# Patient Record
Sex: MALE | Race: WHITE | Employment: FULL TIME | URBAN - METROPOLITAN AREA
[De-identification: names, ages, dates, MRNs, and addresses within clinical notes are randomized per-mention and may not be internally consistent; named-entity substitution may affect disease eponyms.]

---

## 2018-04-22 ENCOUNTER — VIRTUAL VISIT (OUTPATIENT)
Dept: FAMILY MEDICINE | Facility: OTHER | Age: 48
End: 2018-04-22

## 2018-04-22 NOTE — PROGRESS NOTES
"Date:   Clinician: Leopoldo Tsai  Clinician NPI: [id number]  Patient: Iglesia Wolff  Patient : 1970  Patient Address: [address] [place] [address]  Patient Phone: [phone]  Visit Protocol: URI  Patient Summary:  Iglesia is a 47 year old ( : 1970 ) male who initiated a Visit for cold, sinus infection, or influenza. When asked the question \"Please sign me up to receive news, health information and promotions. \", Iglesia responded \"No\".    Iglesia states his symptoms started suddenly 2-3 weeks ago. After his symptoms started, they improved and then got worse again.   His symptoms consist of facial pain or pressure, a cough, a headache, nasal congestion, malaise, and wheezing.   Symptom details     Nasal secretions: The color of his mucus is yellow.    Cough: Iglesia coughs every 5-10 minutes and his cough is more bothersome at night. Phlegm comes into his throat when he coughs. He does not believe the phlegm causes the cough. The color of the phlegm is yellow.     Wheezing: Iglesia has not ever been diagnosed with asthma. The wheezing does not interfere with his normal daily activities.    Facial pain or pressure: The facial pain or pressure feels worse when bending over or leaning forward.     Headache: He states the headache is mild (between 1-3 on a 10 point pain scale).      Iglesia denies having fever, dyspnea, ear pain, sore throat, rhinitis, chills, myalgias, and teeth pain. He also denies having recent facial or sinus surgery in the past 60 days, taking antibiotic medication for the symptoms, and having a sinus infection within the past year.   He has recently been exposed to someone with influenza. Iglesia has not been in close contact with any high risk individuals.   Weight: 290 lbs   Iglesia does not smoke or use smokeless tobacco.   Additional information as reported by the patient (free text): 2 weeks ago started with heavy chest and head congestion, had a " cough that was from deep chest/lungs, that is mostly better, but still have some coughing from upper lungs when I breathe deeply.  lost my voice 6 days ago.  A little throat pain, but not too bad - just from drainage maybe ?   No sign of improvement in anything for about a week.  MEDICATIONS:  No current medications  , ALLERGIES:   NKDA    Clinician Response:  Dear Iglesia,  Based on the information provided, you have acute bacterial sinusitis, also known as a sinus infection. Sinus infections are caused by bacteria or a virus and symptoms are almost always identical. The difference between the 2 types of infections is timing.  Sinus infections start as viral infections and symptoms improve on their own in about 7 days. If symptoms have not improved after 7 days or have even worsened, a bacterial infection may have developed.  Medication information  I am prescribing:     Amoxicillin-pot clavulanate 875-125 mg oral tablet. Take 1 tablet by mouth every 12 hours for 10 days. There are no refills with this prescription.   Antibiotics can cause an allergic reaction even if you have taken them without a problem in the past. If you develop a new rash, swelling, or difficulty breathing, stop the medication and be seen in a clinic or urgent care immediately.  Self care  The following tips will keep you as comfortable as possible while you recover:     Rest    Drink plenty of water and other liquids    Take a hot shower to loosen congestion    Take a spoonful of honey to reduce your cough     When to seek care  Please be seen in a clinic or urgent care if any of the following occur:     Symptoms do not start to improve after 3 days of treatment    New symptoms develop, or symptoms become worse      Diagnosis: Acute bacterial sinusitis  Diagnosis ICD: J01.90  Prescription: amoxicillin-pot clavulanate 875-125 mg oral tablet 20 tablet, 10 days supply. Take 1 tablet by mouth every 12 hours for 10 days. Refills: 0, Refill as  needed: no, Allow substitutions: yes  Pharmacy: [id number] - [phone] - [address], [place] [address]

## 2019-08-26 ENCOUNTER — VIRTUAL VISIT (OUTPATIENT)
Dept: FAMILY MEDICINE | Facility: OTHER | Age: 49
End: 2019-08-26

## 2019-08-26 NOTE — PROGRESS NOTES
"Date:   Clinician: Vito Warner  Clinician NPI: [id number]  Patient: Iglesia Wolff  Patient : 1970  Patient Address: [address] [place] [address]  Patient Phone: [phone]  Visit Protocol: Low back pain  Patient Summary:  Iglesia is a 49 year old ( : 1970 ) male who initiated a Visit for evaluation of Low Back Pain. When asked the question \"Please sign me up to receive news, health information and promotions. \", Iglesia responded \"No\".   A synchronous visit is necessary because the patient reported the following abnormal symptoms:   Unable to walk on toes    Unable to heel walk   His back pain began suddenly 1-6 days ago. The pain is present on the right side and is located in the low back area.   The pain varies depending on the activity.    Iglesia is experiencing shooting pain and weakness in the legs.    Symptom Details     Pain: The pain is moderate (4-6 on a 10 point pain scale). The pain shoots into his buttocks.     Weakness: Iglesia is experiencing weakness in the right leg. The weakness is not getting worse.      Iglesia denies urinary frequency, vomiting, urinary retention, feeling feverish, loss of bladder control, a rash in the same area as the back pain, abdominal pain, back muscle spasms, loss of bowel control, saddle anesthesia, numbness or tingling in the legs, dysuria, and hematuria. His pain does not decrease when bending forward.   Precipitating events  The back pain did not begin in response to an injury that happened at his work. His back pain began after sudden bending or lifting and repetitive bending or lifting.   Pertinent medical history  Iglesia has had similar episodes of back pain in the past. He has not had back surgery, kidney stones, unintended weight loss in the last three months, and cancer.   He has seen a provider to treat this episode of low back pain. Additional information about recent treatment as reported by the patient (free " text): [facility]  Was diagnosed with an inflamed lower disc that was aggravating a nerve causing severe pain, weakness and limited mobility.  I was given a 5 day, 50mg per doseage of Prednisone that did a great job getting rid of the inflammation and once that was gone I was pretty much back to normal.  Not looking for any pain relievers, I know that will go away once the inflammation is gone and OTC options aren't working.   Treatments  Iglesia tried using therapeutic remedies (such as cold pack, heat, chiropractic treatment, physical therapy) and over-the-counter medications to manage his low back pain.   Additional details about medications tried as reported by the patient (free text): Ibuprofen 200mg, 3 pills, 3 times/day   Other approaches used to manage the back pain as reported by the patient (free text): Ice pack on disc area every couple hours.   Iglesia has not tried using prescription medications to manage his back pain.   Iglesia does not smoke or use smokeless tobacco.     MEDICATIONS: Advil oral, ALLERGIES: Advil  Clinician Response:  Dear Iglesia,   Based on the information you have provided, you may have low back pain due to pinched or compressed spinal nerves.  When the nerve root becomes compressed, the pressure could cause various symptoms including pain, weakness, numbness, and tingling. A more complete health history, examination, and testing are possibly needed. For this reason, I would like you to make an appointment to be seen in person.  Medication information  I am prescribing:     Methocarbamol (Robaxin-750) 750 mg oral tablet. Take 2 tablets by mouth every 6 hours for 2 days, then 1 tablet every 6 hours for 5 days. There are no refills with this prescription.   Self care  The following tips will help prevent and reduce back pain:     Apply heating pads on the sore area for a short duration (10 minutes per hour and as needed). A hot bath or a heating pad on your lower  back may also help reduce pain and stiffness.    Maintaining a good posture reduces stress on the back muscles. To help reduce the stress on your back:    Stand and sit up straight.    Try not to slouch when standing or sitting.    Try not to stay in the same position for a long time.    Switch positions every 20 to 30 minutes if possible.    When lifting heavy objects, squat down and use your legs rather than bending at the waist.          Stress can make your back pain worse. For this reason, take time to relax and try some relaxation techniques when you feel stressed.    Click the following link for more information: Prevent back pain.     When to seek care  Please make an appointment to be seen in a clinic or urgent care if any of the following occur:  You develop new symptoms or your symptoms becomes worse  A painful rash that appears as a stripe along one side of the body  Unexplained fever  Unbearable pain  Unrelenting night pain  Seek immediate medical care if you have problems with bowel or bladder control, worsening weakness or numbness in your legs, or numbness in the inner thighs, groin, or buttocks.   Diagnosis: Acute radiculopathy/radiculitis  Diagnosis ICD: M54.10  Additional Clinician Notes: Hold advil  Synchronous Triage: phone, status: completed, duration: 190 seconds  Prescription: prednisone 10 mg oral tablet 20 tablet, 5 days supply. Take 2 tablets by mouth 2 times per day for 5 days. Refills: 0, Refill as needed: no, Allow substitutions: yes  Prescription: methocarbamol (Robaxin-750) 750 mg oral tablet 36 tablet, 7 days supply. Take 2 tablets by mouth every 6 hours for 2 days, then 1 tablet every 6 hours for 5 days. Refills: 0, Refill as needed: no, Allow substitutions: yes  Pharmacy: [facility] #[id number] - [phone] - [address] [place] [address]

## 2020-04-08 ENCOUNTER — VIRTUAL VISIT (OUTPATIENT)
Dept: FAMILY MEDICINE | Facility: OTHER | Age: 50
End: 2020-04-08

## 2020-04-09 NOTE — PROGRESS NOTES
"Date: 2020 19:24:15  Clinician: Allen Arthur  Clinician NPI: [id number]  Patient: Iglesia Wolff  Patient : 1970  Patient Address: [address] [place] [address]  Patient Phone: [phone]  Visit Protocol: Low back pain  Patient Summary:  Iglesia is a 49 year old ( : 1970 ) male who initiated a Visit for evaluation of Low Back Pain. When asked the question \"Please sign me up to receive news, health information and promotions. \", Iglesia responded \"No\".    His back pain began gradually 1-6 days ago. The pain is present on both sides and is located in the buttocks and low back area.   The pain varies depending on the activity and goes away when resting. The pain decreases when he bends forward.   He is able to walk on his toes and is able to walk on his heels with his toes lifted off the ground.   Iglesia is experiencing shooting pain.    Symptom Details   Pain: The pain is moderate (4-6 on a 10 point pain scale). The pain shoots into his buttocks.    Iglesia denies loss of bowel control, saddle anesthesia, numbness or tingling in the legs, dysuria, hematuria, feeling feverish, loss of bladder control, a rash in the same area as the back pain, abdominal pain, urinary frequency, vomiting, urinary retention, leg weakness, and back muscle spasms.   Precipitating events  The back pain did not begin in response to an injury that happened at his work. His back pain began after sudden bending or lifting.   Pertinent medical history  Iglesia has had similar episodes of back pain in the past. He has not had unintended weight loss in the last three months, cancer, back surgery, and kidney stones.   He has not seen a provider to treat this episode of low back pain.   Treatments  Iglesia tried using therapeutic remedies (such as cold pack, heat, chiropractic treatment, physical therapy) and over-the-counter medications to manage his low back pain.   Additional details about medications tried as " reported by the patient (free text): ibuprofen 200mg, 2 pills, twice a day   Other approaches used to manage the back pain as reported by the patient (free text): cold on area of problem disc   Iglesia has not tried using prescription medications to manage his back pain.   Iglesia does not need a return to work/school note.   Iglesia does not smoke or use smokeless tobacco.   Additional information as reported by the patient (free text): was diagnosed a few years ago with a disc that occasionally inflames and causes nerve pain, weakness and mobility issues.  Most instances I can deal with using OTC and rest, but about once a year I have an episode where that doesn't work.   Twice I've had presc of Prednisone that worked to get ahead of the inflammation.  One time was 50mg/day for 5 days, other time was 40/day for five days plus Robaxin.  Both worked.     MEDICATIONS: Advil oral, ALLERGIES: NKDA  Clinician Response:  Dear Iglesia,    I have sent in a prescription for prednisone to be taken daily in the morning with food for&nbsp;5 days. can also do Robaxin three times a day as needed for pain. continue with the ibuprofen and Tylenol as needed for pain. can continue to alternate heat and ice to help with pain.     If would develop worsening pain, bowel or bladder function loss, numbness, tingling, or have any concerns then follow up in clinic.      Diagnosis: Low back pain  Diagnosis ICD: M54.5  Prescription: methocarbamol (Robaxin-750) 750 mg oral tablet 30 tablet, 0 days supply. Take 1 tablet by mouth 3 times per day as needed for pain. Refills: 0, Refill as needed: no, Allow substitutions: yes  Prescription: prednisone 20 mg oral tablet 10 tablet, 5 days supply. Take 2 tablets by mouth 1 time per day for 5 days. Refills: 0, Refill as needed: no, Allow substitutions: yes  Pharmacy: [facility] #[id number] - [phone] - [address] [place] [address]

## 2020-04-13 ENCOUNTER — NURSE TRIAGE (OUTPATIENT)
Dept: NURSING | Facility: CLINIC | Age: 50
End: 2020-04-13

## 2020-04-13 ENCOUNTER — VIRTUAL VISIT (OUTPATIENT)
Dept: URGENT CARE | Facility: CLINIC | Age: 50
End: 2020-04-13
Payer: COMMERCIAL

## 2020-04-13 DIAGNOSIS — R82.998 DARK URINE: Primary | ICD-10-CM

## 2020-04-13 DIAGNOSIS — Z12.11 SPECIAL SCREENING FOR MALIGNANT NEOPLASMS, COLON: ICD-10-CM

## 2020-04-13 PROCEDURE — 82274 ASSAY TEST FOR BLOOD FECAL: CPT | Performed by: INTERNAL MEDICINE

## 2020-04-13 PROCEDURE — 99207 ZZC NO BILLABLE SERVICE THIS VISIT: CPT | Mod: 95 | Performed by: PHYSICIAN ASSISTANT

## 2020-04-13 NOTE — TELEPHONE ENCOUNTER
Triage call:    last 4-5 days   Darker colored urine   Bowel movements have been tan in color and looser   Disc in lower back that gets inflamed- couple fridays ago tweaked the back - recent on-care session (4/8/2020)- prednisone on Thursday - finished dose pack   No fever-   Drinking some fluids- encouraged him to try to drink more fluids.     Triaged to have a TV visit today. Tranferred to scheduling.     Elaine Motley RN BSN 4/13/2020 2:02 PM    COVID 19 Nurse Triage Plan/Patient Instructions    Please be aware that novel coronavirus (COVID-19) may be circulating in the community. If you develop symptoms such as fever, cough, or SOB or if you have concerns about the presence of another infection including coronavirus (COVID-19), please contact your health care provider or visit [email or web address].     Disposition/Instructions    Patient to have scheduled Telephone Visit with a provider. Follow System Ambulatory Workflow for COVID 19.     The clinic staff will assist you to schedule an appointment to complete the Telephone Visit with a provider during normal clinic hours.       Call Back If: Your symptoms worsen before you are able to complete your Telephone Visit with a provider.    Thank you for limiting contact with others, wearing a simple mask to cover your cough, practice good hand hygiene habits and accessing our virtual services where possible to limit the spread of this virus.    For more information about COVID19 and options for caring for yourself at home, please visit the CDC website at https://www.cdc.gov/coronavirus/2019-ncov/about/steps-when-sick.html  For more options for care at [facility], please visit our website at [email or web address]    For more information, please use the [place] Department of Health ([facility]) COVID-19 Hotlines (Interpreters available):     Health questions: Phone Number: [phone] or [phone] and Hours: 7 a.m. to 7 p.m.    Schools and   questions: Phone Number: [phone] or [phone] and Hours 7 a.m. to 7 p.m.                         Reason for Disposition    [1] Abnormal color is unexplained AND [2] persists > 24 hours    Additional Information    Negative: Rectal bleeding, bloody stools, or blood in stool (bowel movement)    Negative: Tarry or jet black-colored stool    Negative: [1] Stool color is black (not dark green) AND [2] patient hasn't swallowed substance that causes black stools (e.g., Pepto-Bismol, iron pills)    Negative: Diarrhea stools (i.e., watery stools, or increase in the frequency and looseness of stools)    Negative: [1] Abdomen pain is main symptom AND [2] adult male    Negative: [1] Abdomen pain is main symptom AND [2] adult female    Negative: Yellow eyes (jaundice)    Negative: Patient sounds very sick or weak to the triager    Negative: [1] Stool is light gray or whitish AND [2] unexplained    Negative: [1] Stool is mucus or pus AND [2] persists > 24 hours    Protocols used: STOOLS - UNUSUAL COLOR-A-

## 2020-04-13 NOTE — PROGRESS NOTES
"  Iglesia Wolff is a 49 year old male who is being evaluated via a billable telephone visit.      The patient has been notified of following:     \"This telephone visit will be conducted via a call between you and your physician/provider. We have found that certain health care needs can be provided without the need for a physical exam.  This service lets us provide the care you need with a short phone conversation.  If a prescription is necessary we can send it directly to your pharmacy.  If lab work is needed we can place an order for that and you can then stop by our lab to have the test done at a later time.    If during the course of the call the physician/provider feels a telephone visit is not appropriate, you will not be charged for this service.\"     Patient has given verbal consent for Telephone visit?  Yes        HPI: Iglesia Wolff is an 49 year old male who presents for evaluation and treatment of dark urine, and loose tan colored stools.    Patient instructed to call to set up an appointment to establish care with one of our primary care providers.    Patient verbalized understanding and will call [facility] to set this up.    Dano Rodas PA-C             "

## 2020-04-14 ENCOUNTER — TELEPHONE (OUTPATIENT)
Dept: GASTROENTEROLOGY | Facility: CLINIC | Age: 50
End: 2020-04-14

## 2020-04-14 ENCOUNTER — OFFICE VISIT (OUTPATIENT)
Dept: FAMILY MEDICINE | Facility: CLINIC | Age: 50
End: 2020-04-14
Payer: COMMERCIAL

## 2020-04-14 VITALS
HEIGHT: 70 IN | DIASTOLIC BLOOD PRESSURE: 106 MMHG | HEART RATE: 83 BPM | TEMPERATURE: 99.1 F | OXYGEN SATURATION: 94 % | WEIGHT: 305.7 LBS | SYSTOLIC BLOOD PRESSURE: 161 MMHG | BODY MASS INDEX: 43.76 KG/M2

## 2020-04-14 DIAGNOSIS — R79.89 ELEVATED LFTS: ICD-10-CM

## 2020-04-14 DIAGNOSIS — I16.0 ASYMPTOMATIC HYPERTENSIVE URGENCY: ICD-10-CM

## 2020-04-14 DIAGNOSIS — E66.01 MORBID OBESITY (H): ICD-10-CM

## 2020-04-14 DIAGNOSIS — G89.29 CHRONIC RIGHT-SIDED LOW BACK PAIN WITH RIGHT-SIDED SCIATICA: ICD-10-CM

## 2020-04-14 DIAGNOSIS — Z87.898 HISTORY OF VOMITING: ICD-10-CM

## 2020-04-14 DIAGNOSIS — Z13.220 LIPID SCREENING: ICD-10-CM

## 2020-04-14 DIAGNOSIS — Z13.1 SCREENING FOR DIABETES MELLITUS: ICD-10-CM

## 2020-04-14 DIAGNOSIS — M54.41 CHRONIC RIGHT-SIDED LOW BACK PAIN WITH RIGHT-SIDED SCIATICA: ICD-10-CM

## 2020-04-14 DIAGNOSIS — I10 BENIGN ESSENTIAL HYPERTENSION: Primary | ICD-10-CM

## 2020-04-14 DIAGNOSIS — Z12.11 SPECIAL SCREENING FOR MALIGNANT NEOPLASMS, COLON: ICD-10-CM

## 2020-04-14 DIAGNOSIS — R19.5 CLAY-COLORED STOOLS: ICD-10-CM

## 2020-04-14 LAB
ALBUMIN SERPL-MCNC: 3.4 G/DL (ref 3.4–5)
ALBUMIN UR-MCNC: NEGATIVE MG/DL
ALP SERPL-CCNC: 325 U/L (ref 40–150)
ALT SERPL W P-5'-P-CCNC: 698 U/L (ref 0–70)
ANION GAP SERPL CALCULATED.3IONS-SCNC: 11 MMOL/L (ref 3–14)
APPEARANCE UR: CLEAR
AST SERPL W P-5'-P-CCNC: 187 U/L (ref 0–45)
BILIRUB SERPL-MCNC: 3.5 MG/DL (ref 0.2–1.3)
BILIRUB UR QL STRIP: NEGATIVE
BUN SERPL-MCNC: 19 MG/DL (ref 7–30)
CALCIUM SERPL-MCNC: 8.9 MG/DL (ref 8.5–10.1)
CHLORIDE SERPL-SCNC: 100 MMOL/L (ref 94–109)
CHOLEST SERPL-MCNC: 271 MG/DL
CO2 SERPL-SCNC: 24 MMOL/L (ref 20–32)
COLOR UR AUTO: YELLOW
CREAT SERPL-MCNC: 0.97 MG/DL (ref 0.66–1.25)
CREAT UR-MCNC: 89 MG/DL
CRP SERPL-MCNC: 9.7 MG/L (ref 0–8)
ERYTHROCYTE [SEDIMENTATION RATE] IN BLOOD BY WESTERGREN METHOD: 8 MM/H (ref 0–20)
GFR SERPL CREATININE-BSD FRML MDRD: 89 ML/MIN/{1.73_M2}
GGT SERPL-CCNC: 1324 U/L (ref 0–75)
GLUCOSE SERPL-MCNC: 106 MG/DL (ref 70–99)
GLUCOSE UR STRIP-MCNC: NEGATIVE MG/DL
HBA1C MFR BLD: 5.9 % (ref 0–5.6)
HDLC SERPL-MCNC: 60 MG/DL
HGB UR QL STRIP: NEGATIVE
KETONES UR STRIP-MCNC: NEGATIVE MG/DL
LDLC SERPL CALC-MCNC: 183 MG/DL
LEUKOCYTE ESTERASE UR QL STRIP: NEGATIVE
LIPASE SERPL-CCNC: 111 U/L (ref 73–393)
MICROALBUMIN UR-MCNC: 10 MG/L
MICROALBUMIN/CREAT UR: 11.44 MG/G CR (ref 0–17)
NITRATE UR QL: NEGATIVE
NONHDLC SERPL-MCNC: 211 MG/DL
PH UR STRIP: 6 PH (ref 5–7)
POTASSIUM SERPL-SCNC: 3.5 MMOL/L (ref 3.4–5.3)
PROT SERPL-MCNC: 8.1 G/DL (ref 6.8–8.8)
SODIUM SERPL-SCNC: 135 MMOL/L (ref 133–144)
SOURCE: NORMAL
SP GR UR STRIP: 1.01 (ref 1–1.03)
TRIGL SERPL-MCNC: 139 MG/DL
UROBILINOGEN UR STRIP-ACNC: 1 EU/DL (ref 0.2–1)

## 2020-04-14 PROCEDURE — 81003 URINALYSIS AUTO W/O SCOPE: CPT | Performed by: INTERNAL MEDICINE

## 2020-04-14 PROCEDURE — 99205 OFFICE O/P NEW HI 60 MIN: CPT | Performed by: INTERNAL MEDICINE

## 2020-04-14 PROCEDURE — 82043 UR ALBUMIN QUANTITATIVE: CPT | Performed by: INTERNAL MEDICINE

## 2020-04-14 PROCEDURE — 86140 C-REACTIVE PROTEIN: CPT | Performed by: INTERNAL MEDICINE

## 2020-04-14 PROCEDURE — 83036 HEMOGLOBIN GLYCOSYLATED A1C: CPT | Performed by: INTERNAL MEDICINE

## 2020-04-14 PROCEDURE — 36415 COLL VENOUS BLD VENIPUNCTURE: CPT | Performed by: INTERNAL MEDICINE

## 2020-04-14 PROCEDURE — 93000 ELECTROCARDIOGRAM COMPLETE: CPT | Performed by: INTERNAL MEDICINE

## 2020-04-14 PROCEDURE — 82977 ASSAY OF GGT: CPT | Performed by: INTERNAL MEDICINE

## 2020-04-14 PROCEDURE — 80061 LIPID PANEL: CPT | Performed by: INTERNAL MEDICINE

## 2020-04-14 PROCEDURE — 80053 COMPREHEN METABOLIC PANEL: CPT | Performed by: INTERNAL MEDICINE

## 2020-04-14 PROCEDURE — 85652 RBC SED RATE AUTOMATED: CPT | Performed by: INTERNAL MEDICINE

## 2020-04-14 PROCEDURE — 83690 ASSAY OF LIPASE: CPT | Performed by: INTERNAL MEDICINE

## 2020-04-14 RX ORDER — LOSARTAN POTASSIUM AND HYDROCHLOROTHIAZIDE 12.5; 5 MG/1; MG/1
TABLET ORAL
Qty: 90 TABLET | Refills: 0 | Status: SHIPPED | OUTPATIENT
Start: 2020-04-14 | End: 2020-05-07

## 2020-04-14 ASSESSMENT — MIFFLIN-ST. JEOR: SCORE: 2252.9

## 2020-04-14 NOTE — PROGRESS NOTES
Subjective     Iglesia Wolff is a 49 year old male who presents to clinic today for the following health issues:    HPI   Genitourinary symptoms      Duration: 5 days    Description:  back pain, pain in testicle and pale stool    Intensity:  moderate    Accompanying signs and symptoms (fever/discharge/nausea/vomiting/back or abdominal pain):  Back pain    History (frequent UTI's/kidney stones/prostate problems): None  Sexually active: YES    Precipitating or alleviating factors: None    Therapies tried and outcome: none   Outcome:     Back Pain       Duration: 2-3 weeks        Specific cause: none    Description:   Location of pain: low back both  Character of pain: dull ache  Pain radiation:little bit to the testicles  New numbness or weakness in legs, not attributed to pain:  no     Intensity: Currently 1/10    History:   Pain interferes with job: No  History of back problems: recurrent self limited episodes of low back pain in the past  Any previous MRI or X-rays: None  Sees a specialist for back pain:  No  Therapies tried without relief: prednisone    Alleviating factors:   Improved by: lying on sides      Precipitating factors:  Worsened by: certain positions and angles           Accompanying Signs & Symptoms:  Risk of Fracture:  None  Risk of Cauda Equina:  None  Risk of Infection:  None  Risk of Cancer:  None  Risk of Ankylosing Spondylitis:  Onset at age <35, male, AND morning back stiffness. no     Patient presenting for evaluation of light-colored stools, one week ago he describes he got sick.  Also describes back issues as lower back pain with right-sided sciatica, has been taking ibuprofen.  2 Fridays ago his back started hurting and he started taking ibuprofen 1 tablets of 800 mg dose twice a day, he also developed 1 week ago indigestion, he started throwing up having stomachache, he started having biliary emesis then after that he gradually started feeling better and by Thursday he was better.  He  was put on prednisone 40 mg for 4 days as well as Robaxin which he has not taken for back pain flare, last Sunday the color of stools started changing into krystyna colored. Denies any associated fever or chills, not known to have gallbladder problems.  No postprandial pain, no nausea, urine color describes as light orange, denies any blood in the stools, he notes passing 2 bowel movements per day; slightly loose not firm.  Denies any chest pain, in the summertime he exercises. in wintertime not much activities; does not exercise on a regular basis.  Denies history of apnea, he has occasional snoring.  Denies any dizziness, weakness, imbalance symptoms, no cough, no respiratory symptoms, no jaundice, no fatigue or heartburn;  does not occur often.  Previous surgical history hernia; left inguinal  Family history GF ? cancer , mom has type 2 diabetes.          Patient Active Problem List   Diagnosis     CARDIOVASCULAR SCREENING; LDL GOAL LESS THAN 160     Morbid obesity (H)     Past Surgical History:   Procedure Laterality Date     SURGICAL HISTORY OF -   1979    Bilateral inguinal hernia repair       Social History     Tobacco Use     Smoking status: Never Smoker     Smokeless tobacco: Never Used   Substance Use Topics     Alcohol use: Yes     Comment: Rare, social use     Family History   Problem Relation Age of Onset     Family History Negative Father      Family History Negative Mother      Coronary Stenting Brother          Current Outpatient Medications   Medication Sig Dispense Refill     losartan-hydrochlorothiazide (HYZAAR) 50-12.5 MG tablet Start with half tablet once daily x 3 days than increase to one tablet daily/ 90 tablet 0     VIAGRA 50 MG OR TABS ONE TABLET TAKEN AT LEAST 30 MINUTES BEFORE INTERCOURSE (Patient not taking: No sig reported) 12 3     No Known Allergies  Recent Labs   Lab Test 04/14/20  1105   A1C 5.9*   *   HDL 60   TRIG 139   *   CR 0.97   GFRESTIMATED 89   GFRESTBLACK 103  "  POTASSIUM 3.5      BP Readings from Last 3 Encounters:   04/14/20 (!) 161/106   12/16/09 122/78   12/16/08 130/80    Wt Readings from Last 3 Encounters:   04/14/20 138.7 kg (305 lb 11.2 oz)   12/16/09 119.7 kg (264 lb)   12/16/08 123.4 kg (272 lb)                    Reviewed and updated as needed this visit by Provider  Tobacco  Allergies  Meds  Problems  Med Hx  Surg Hx  Fam Hx  Soc Hx          Review of Systems   ROS COMP: Constitutional, HEENT, cardiovascular, pulmonary, GI, , musculoskeletal, neuro, skin, endocrine and psych systems are negative, except as otherwise noted.      Objective    BP (!) 161/106 (BP Location: Left arm, Patient Position: Sitting, Cuff Size: Adult Regular)   Pulse 83   Temp 99.1  F (37.3  C) (Tympanic)   Ht 1.778 m (5' 10\")   Wt 138.7 kg (305 lb 11.2 oz)   SpO2 94%   BMI 43.86 kg/m    Body mass index is 43.86 kg/m .  Physical Exam   GENERAL: healthy, alert and no distress  EYES: Eyes grossly normal to inspection, PERRL and conjunctivae and sclerae normal  HENT: ear canals and TM's normal, nose and mouth without ulcers or lesions  NECK: no adenopathy, no asymmetry, masses, or scars and thyroid normal to palpation  RESP: lungs clear to auscultation - no rales, rhonchi or wheezes  CV: regular rate and rhythm, normal S1 S2, no S3 or S4, no murmur, click or rub, no peripheral edema and peripheral pulses strong  ABDOMEN: Obese, soft, nontender,  no masses and bowel sounds normal  MS: no gross musculoskeletal defects noted, no edema  SKIN: no suspicious lesions or rashes, no jaundice,  NEURO: Normal strength and tone, mentation intact and speech normal, negative leg raising signs, rasining legs elicited pain in lower back with out radiculopathy,  PSYCH: mentation appears normal, affect normal/bright    Diagnostic Test Results:  Labs reviewed in Epic        Assessment & Plan     1. Benign essential hypertension  Close monitoring as outpatient -call us with blood pressure " readings  - EKG 12-lead complete w/read - Clinics  - Comprehensive metabolic panel (BMP + Alb, Alk Phos, ALT, AST, Total. Bili, TP)  - Albumin Random Urine Quantitative with Creat Ratio  - losartan-hydrochlorothiazide (HYZAAR) 50-12.5 MG tablet; Start with half tablet once daily x 3 days than increase to one tablet daily/  Dispense: 90 tablet; Refill: 0  - UA reflex to Microscopic    2. Asymptomatic hypertensive urgency  Due to COVID19 pandemic will avoid sending him to the ER, will start him on Antihypertensive medication, advised patient to closely monitor him as outpatient, he will need an echocardiogram as well.  - EKG 12-lead complete w/read - Clinics  - Comprehensive metabolic panel (BMP + Alb, Alk Phos, ALT, AST, Total. Bili, TP)  - Albumin Random Urine Quantitative with Creat Ratio    3. Morbid obesity (H)  Counseled on lifestyle changes, concern with insulin resistance/metabolic syndrome    4. Lipid screening  Pending lipid panel will need to start on statin  - Lipid panel reflex to direct LDL Fasting    5. Screening for diabetes mellitus  - Hemoglobin A1c    6. Chronic right-sided low back pain with right-sided sciatica  Follow-up with spine clinic  - Orthopedic & Spine  Referral; Future    7. Mateusz-colored stools  Rule out biliary tract obstruction check LFTs    8. History of vomiting  Currently subsided  - Lipase  - GGT  - ESR: Erythrocyte sedimentation rate  - CRP, inflammation  - US Abdomen Complete w Doppler Complete; Future  - GASTROENTEROLOGY ADULT REF CONSULT ONLY; Future    9. Special screening for malignant neoplasms, colon  - Fecal colorectal cancer screen (FIT); Future    Addendum reviewed labs Liver enzymes elevated as well as elevated GGT and  bilirubin ;discussed the case with GI Fellow on Call with [facility], patient will need to do ultrasound of the abdomen, look at his gallbladder biliary ducts, rule out any mass or gallbladder stone.  He may eventually need an ERCP.  Advised patient if  "any fever, right upper abdominal pain or other systemic complaints to notify us and seek immediate medical help. Lipid panel shows hyperlipidemia, will hold on starting statin due to transaminitis.     Elevated LFTs  Discussed case with GI fellow.  Patient to follow-up with GI  - US Abdomen Complete w Doppler Complete; Future  - GASTROENTEROLOGY ADULT REF CONSULT ONLY; Future     BMI:   Estimated body mass index is 43.86 kg/m  as calculated from the following:    Height as of this encounter: 1.778 m (5' 10\").    Weight as of this encounter: 138.7 kg (305 lb 11.2 oz).   Weight management plan: Discussed healthy diet and exercise guidelines        Work on weight loss  Regular exercise  See Patient Instructions    Return in about 1 week (around 4/21/2020), or if symptoms worsen or fail to improve, for GI.    Tc Greenwood MD  [facility]    "

## 2020-04-14 NOTE — TELEPHONE ENCOUNTER
GI Fellow On Call Note  04/14/20    Received call from Dr. Greenwood regarding patient presenting with krystyna colored stools and vomiting last week, found to have cholestatic liver function tests. Vomiting resolved. Has not been febrile.      DDx includes stone vs mass; less likely liver injury given cholestatic injury. Recommended U/S Abdomen - likely will need EUS +/- ERCP. If patient develops abdominal pain/fever should present to ED for emergent evaluation/antibiotics.     I have informed on call staff Dr. Gilmore via Epic Message and will route this note to him as well.     Hellen Trammell MD  GI Fellow

## 2020-04-15 ENCOUNTER — TELEPHONE (OUTPATIENT)
Dept: FAMILY MEDICINE | Facility: CLINIC | Age: 50
End: 2020-04-15

## 2020-04-15 DIAGNOSIS — Z71.9 ENCOUNTER FOR CONSULTATION: Primary | ICD-10-CM

## 2020-04-15 DIAGNOSIS — R79.89 ELEVATED LFTS: Primary | ICD-10-CM

## 2020-04-15 NOTE — TELEPHONE ENCOUNTER
----- Message -----   From: Logan Gilmore MD   Sent: 4/15/2020   7:47 AM CDT   To: Hellen Trammell MD, Tc Greenwood MD   Subject: RE: Urgent Referral                               Yeah, I agree with Hellen. Sounds like painless jaundice that could be from a malignant process. Agree with starting with a ultrasound first to rule out stones, but suspect he'll probably need a CT afterwards to look for/stage a mass. We can then arrange for an EUS/ERCP afterwards depending on the findings. Let me know when the imaging is back and I can review and facilitate.       Logan     ----- Message -----   From: Hellen Trammell MD   Sent: 4/14/2020   5:11 PM CDT   To: Tc Greenwood MD, Logan Gilmore MD   Subject: Urgent Referral                                   Hi Dr. Gilmore,     I spoke with Dr. Greenwood this evening regarding an urgent outpatient consult.     He saw a patient in clinic today with vomiting last week and krystyna colored stools. He has not been having ongoing vomiting, and has not had fevers. LFTs revealed bilirubin of 3.5, with cholestatic LFTs. I advised starting with an U/S Abdomen and presenting to ED if he develops abdominal pain and/or fevers. He will likely need EUS +/- ERCP.     Let me know if there is anything further I can do to help.     Sincerely,     Hellen Trammell   GI Fellow

## 2020-04-15 NOTE — RESULT ENCOUNTER NOTE
This provider has discussed all lab results with patient by phone.  Ultrasound of abdomen ordered.  Referral to GI was put.  Lipid panel shows hyperlipidemia.  We will hold on starting statin at this time due to elevated liver function test.

## 2020-04-17 ENCOUNTER — PREP FOR PROCEDURE (OUTPATIENT)
Dept: GASTROENTEROLOGY | Facility: CLINIC | Age: 50
End: 2020-04-17

## 2020-04-17 ENCOUNTER — TELEPHONE (OUTPATIENT)
Dept: FAMILY MEDICINE | Facility: CLINIC | Age: 50
End: 2020-04-17

## 2020-04-17 ENCOUNTER — CARE COORDINATION (OUTPATIENT)
Dept: GASTROENTEROLOGY | Facility: CLINIC | Age: 50
End: 2020-04-17

## 2020-04-17 ENCOUNTER — HOSPITAL ENCOUNTER (OUTPATIENT)
Dept: ULTRASOUND IMAGING | Facility: CLINIC | Age: 50
Discharge: HOME | End: 2020-04-17
Attending: INTERNAL MEDICINE | Admitting: INTERNAL MEDICINE
Payer: COMMERCIAL

## 2020-04-17 DIAGNOSIS — R17 JAUNDICE: Primary | ICD-10-CM

## 2020-04-17 DIAGNOSIS — R79.89 ELEVATED LFTS: ICD-10-CM

## 2020-04-17 PROCEDURE — 76700 US EXAM ABDOM COMPLETE: CPT | Mod: 59

## 2020-04-17 NOTE — RESULT ENCOUNTER NOTE
Please notify patient ultrasound of the abdomen was reviewed by this provider by GI, there is liver parenchymal disease probable fatty liver as well as an enlargement of the spleen as well as suspected gallbladder stones.  GI doctor has seen  ultrasound and would like to schedule an appointment with the patient and possible doing further procedure endoscopic ultrasound/and ERCP.  GI will contact patient for further scheduling.  Any further questions happy to address  Dr. Greenwood

## 2020-04-17 NOTE — TELEPHONE ENCOUNTER
Called Pt:    No answer, left VM to return call to clinic. Also notified there is a detailed MyC from PCP with results. Also, Pt likely received call from GI as it looks like Care Coordination contacted Pt     Shreya RAMON RN      Notes recorded by Tc Greenwood MD on 4/17/2020 at 12:54 PM CDT   Please notify patient ultrasound of the abdomen was reviewed by this provider by GI, there is liver parenchymal disease probable fatty liver as well as an enlargement of the spleen as well as suspected gallbladder stones.  GI doctor has seen  ultrasound and would like to schedule an appointment with the patient and possible doing further procedure endoscopic ultrasound/and ERCP.  GI will contact patient for further scheduling.  Any further questions happy to address   Dr. Greenwood

## 2020-04-17 NOTE — PROGRESS NOTES
[facility] Internal Procedure Intake form:    Referring/Requesting provider: Timo Santana contact - Sandhya Stout    Procedure Requested: per Dr. Gilmore MRI/MRCP on this patient and tenatively put him on for an EUS/ERCP with me next week for elevated LFTs? If the MRI is still nondiagnostic (although should visualize a mass but can miss a small stone), then we'll look on EUS for a stone. If no stone on EUS, then I'll do a liver biopsy during the procedure and have him follow up with hepatology.     Procedure/Imaging/Clinic: MRI/MRCP, then EUS/ERCP   Physician: Mando   Timing: by next week   Procedure length: 90 min   Anesthesia: Gen   Dx: jaundice   [facility]     Has patient been evaluated in clinic or had a procedure [facility] provider in the last 5 years: No    Specific method of sedation requested: General    History and physical within last 30 days? Yes seen by PCP 4/14/20    Is procedure to rule out malignancy or is there concern for underlying malignancy (if yes, send messages as High priority): Yes     Requested urgency of procedure: next week    Did referring provider place Gastroenterology procedure referral in [facility] - No     Is patient is aware of request for procedure and ok to be contacted to schedule?Yes     OR orders placed and patient is aware of call regarding possible procedure.     We also discussed MRI and he has requested that this be done at [facility] which is closer for him to drive to.      Scheduling will contact and arrange MRI and virtual clinic visit with Dr. Gilmore for next week    I have provided my contact information and asked him to call with additional questions or concerns.    Danette Beaver RN   BSN, HNBC, STAR-T  [facility]  Care Coordinator  Ph: [phone]  FAX: [phone]

## 2020-04-18 LAB — HEMOCCULT STL QL IA: NEGATIVE

## 2020-04-18 NOTE — RESULT ENCOUNTER NOTE
This provider called patient advised him of ultrasound results.  Advised him that GI with [facility] will contact him regarding scheduling MRI MRCP as well as probable endoscopic ultrasound and ERCP procedure pending the results of the MRCP, advised him that Dr. Gilmore is the gastroenterologist following on his case and care coordination will be contacting him for scheduling follow-up appointments.  Patient reiterated understanding.  Patient states he has been doing better, his color stools are back to normal; brown.  He denies any abdominal pain, fever, vomiting or other systemic complaints. Patient appreciative of follow up phone call.

## 2020-04-20 NOTE — TELEPHONE ENCOUNTER
"ONCOLOGY INTAKE: Records Information      APPT INFORMATION:  Referring provider:  KAI IBARRA MD  Referring provider s clinic: [facility]  Reason for visit/diagnosis: jaundice   Has patient been notified of appointment date and time?: Yes    RECORDS INFORMATION:  Were the records received with the referral (via [employer])? No    Has patient been seen for any external appt for this diagnosis? No    If yes, where? N/a    Has patient had any imaging or procedures outside of [place] for this condition? No      If Yes, where? N/a    ADDITIONAL INFORMATION:  MRI to be done at [facility] this week - please arrange (Myrna Gilmore wants to \"see\" him in clinic so no need for your to arrange MRI) - patient request   Virtual clinic visit with Dr Gilmore     "

## 2020-04-21 ENCOUNTER — HOSPITAL ENCOUNTER (OUTPATIENT)
Facility: CLINIC | Age: 50
End: 2020-04-21
Attending: INTERNAL MEDICINE | Admitting: INTERNAL MEDICINE
Payer: COMMERCIAL

## 2020-04-21 DIAGNOSIS — R17 JAUNDICE: ICD-10-CM

## 2020-04-21 NOTE — TELEPHONE ENCOUNTER
Reschedule 4/23 video visit with Dr. Hernandez to 4/29 video visit with Dr. Ford due to scheduling error. Pt aware of changes.    Please see pre-visit with Dr. Hernandez on 4/23

## 2020-04-22 ENCOUNTER — HOSPITAL ENCOUNTER (OUTPATIENT)
Dept: MRI IMAGING | Facility: CLINIC | Age: 50
Discharge: HOME | End: 2020-04-22
Attending: INTERNAL MEDICINE | Admitting: INTERNAL MEDICINE
Payer: COMMERCIAL

## 2020-04-22 DIAGNOSIS — R17 JAUNDICE: ICD-10-CM

## 2020-04-22 PROCEDURE — A9585 GADOBUTROL INJECTION: HCPCS | Performed by: INTERNAL MEDICINE

## 2020-04-22 PROCEDURE — 74183 MRI ABD W/O CNTR FLWD CNTR: CPT

## 2020-04-22 PROCEDURE — 25500064 ZZH RX 255 OP 636: Performed by: INTERNAL MEDICINE

## 2020-04-22 RX ORDER — GADOBUTROL 604.72 MG/ML
13 INJECTION INTRAVENOUS ONCE
Status: COMPLETED | OUTPATIENT
Start: 2020-04-22 | End: 2020-04-22

## 2020-04-22 RX ADMIN — GADOBUTROL 13 ML: 604.72 INJECTION INTRAVENOUS at 09:35

## 2020-04-22 NOTE — TELEPHONE ENCOUNTER
RECORDS STATUS - ALL OTHER DIAGNOSIS      RECORDS RECEIVED FROM: [facility] - Internal Referral   DATE RECEIVED: 4/22/20   NOTES STATUS DETAILS   OFFICE NOTE from referring provider Tc Hill MD   OFFICE NOTE from medical oncologist     DISCHARGE SUMMARY from hospital     DISCHARGE REPORT from the ER     OPERATIVE REPORT     MEDICATION LIST     CLINICAL TRIAL TREATMENTS TO DATE     LABS     PATHOLOGY REPORTS     ANYTHING RELATED TO DIAGNOSIS     GENONOMIC TESTING     TYPE:     IMAGING (NEED IMAGES & REPORT)     CT SCANS     MRI PACS 4/22/19   MAMMO     ULTRASOUND     PET

## 2020-04-23 ENCOUNTER — PRE VISIT (OUTPATIENT)
Dept: ONCOLOGY | Facility: CLINIC | Age: 50
End: 2020-04-23

## 2020-04-23 ENCOUNTER — PATIENT OUTREACH (OUTPATIENT)
Dept: GASTROENTEROLOGY | Facility: CLINIC | Age: 50
End: 2020-04-23

## 2020-04-23 DIAGNOSIS — R17 JAUNDICE: Primary | ICD-10-CM

## 2020-04-23 NOTE — RESULT ENCOUNTER NOTE
I called the patient and reviewed his MRI/MRCP results. No mass or choledocholithiasis seen. No biliary dilation either. He does have stones in his gallbladder. Interestingly, all his symptoms resolved a few days ago and his urine and stool are back to normal. It is possible that he had a stone that passed. Alternatively, he has a primary liver parenchymal process. I recommended rechecking liver tests along with viral hepatitis work up which we will order:  - HBsAg, HBcAb, HBsAb   - HAV IgM, HAV IgG   - HCV Ab   - TYLER   - EBV PCR     If it all returns as normal and LFTs have normalized, will recommend referral to general surgery for cholecystectomy for likely passed choledocholithiasis.    Logan Gilmore MD  [facility]  Division of Gastroenterology and Hepatology  [facility] [address] [place], [place] [address]

## 2020-04-24 DIAGNOSIS — Z12.11 SPECIAL SCREENING FOR MALIGNANT NEOPLASMS, COLON: Primary | ICD-10-CM

## 2020-04-24 DIAGNOSIS — R17 JAUNDICE: ICD-10-CM

## 2020-04-24 LAB
ALBUMIN SERPL-MCNC: 3.9 G/DL (ref 3.4–5)
ALP SERPL-CCNC: 149 U/L (ref 40–150)
ALT SERPL W P-5'-P-CCNC: 135 U/L (ref 0–70)
AST SERPL W P-5'-P-CCNC: 40 U/L (ref 0–45)
BILIRUB DIRECT SERPL-MCNC: 0.5 MG/DL (ref 0–0.2)
BILIRUB SERPL-MCNC: 1.4 MG/DL (ref 0.2–1.3)
INR PPP: 1.01 (ref 0.86–1.14)
PROT SERPL-MCNC: 7.8 G/DL (ref 6.8–8.8)

## 2020-04-24 PROCEDURE — 86803 HEPATITIS C AB TEST: CPT | Performed by: INTERNAL MEDICINE

## 2020-04-24 PROCEDURE — 86709 HEPATITIS A IGM ANTIBODY: CPT | Performed by: INTERNAL MEDICINE

## 2020-04-24 PROCEDURE — 87798 DETECT AGENT NOS DNA AMP: CPT | Performed by: NURSE PRACTITIONER

## 2020-04-24 PROCEDURE — 85610 PROTHROMBIN TIME: CPT | Performed by: NURSE PRACTITIONER

## 2020-04-24 PROCEDURE — 99000 SPECIMEN HANDLING OFFICE-LAB: CPT | Performed by: NURSE PRACTITIONER

## 2020-04-24 PROCEDURE — 86038 ANTINUCLEAR ANTIBODIES: CPT | Performed by: INTERNAL MEDICINE

## 2020-04-24 PROCEDURE — 86704 HEP B CORE ANTIBODY TOTAL: CPT | Performed by: INTERNAL MEDICINE

## 2020-04-24 PROCEDURE — 80076 HEPATIC FUNCTION PANEL: CPT | Performed by: NURSE PRACTITIONER

## 2020-04-24 PROCEDURE — 86706 HEP B SURFACE ANTIBODY: CPT | Performed by: INTERNAL MEDICINE

## 2020-04-24 PROCEDURE — 36415 COLL VENOUS BLD VENIPUNCTURE: CPT | Performed by: INTERNAL MEDICINE

## 2020-04-24 PROCEDURE — 87340 HEPATITIS B SURFACE AG IA: CPT | Performed by: INTERNAL MEDICINE

## 2020-04-26 LAB
HAV IGM SERPL QL IA: NONREACTIVE
HBV CORE AB SERPL QL IA: NONREACTIVE
HBV SURFACE AB SERPL IA-ACNC: 0 M[IU]/ML
HBV SURFACE AG SERPL QL IA: NONREACTIVE
HCV AB SERPL QL IA: NONREACTIVE

## 2020-04-26 RX ORDER — INDOMETHACIN 50 MG/1
100 SUPPOSITORY RECTAL
Status: CANCELLED | OUTPATIENT
Start: 2020-04-26

## 2020-04-26 RX ORDER — LIDOCAINE 40 MG/G
CREAM TOPICAL
Status: CANCELLED | OUTPATIENT
Start: 2020-04-26

## 2020-04-27 ENCOUNTER — TELEPHONE (OUTPATIENT)
Dept: SCHEDULING | Facility: CLINIC | Age: 50
End: 2020-04-27

## 2020-04-27 LAB
EBV DNA SPEC QL NAA+PROBE: NOT DETECTED
SPECIMEN SOURCE: NORMAL

## 2020-04-27 NOTE — TELEPHONE ENCOUNTER
4/27/2020        Call to help patient schedule a pre-op COVID test. Patient state that he was never aware of his surgery date and it was never confirm with him to have his surgery on 5/1/2020. He wants to speak to the doctor first before scheduling anything.           Outreach ,  John Cox

## 2020-04-28 ENCOUNTER — OFFICE VISIT (OUTPATIENT)
Dept: URGENT CARE | Facility: URGENT CARE | Age: 50
End: 2020-04-28
Payer: COMMERCIAL

## 2020-04-28 DIAGNOSIS — Z20.822 SUSPECTED 2019 NOVEL CORONAVIRUS INFECTION: Primary | ICD-10-CM

## 2020-04-28 LAB — ANA SER QL IF: NEGATIVE

## 2020-04-28 PROCEDURE — 99207 ZZC NO BILLABLE SERVICE THIS VISIT: CPT

## 2020-04-28 PROCEDURE — 99000 SPECIMEN HANDLING OFFICE-LAB: CPT | Performed by: FAMILY MEDICINE

## 2020-04-28 PROCEDURE — 87635 SARS-COV-2 COVID-19 AMP PRB: CPT | Performed by: FAMILY MEDICINE

## 2020-04-29 ENCOUNTER — PATIENT OUTREACH (OUTPATIENT)
Dept: SURGERY | Facility: CLINIC | Age: 50
End: 2020-04-29

## 2020-04-29 ENCOUNTER — PRE VISIT (OUTPATIENT)
Dept: GASTROENTEROLOGY | Facility: CLINIC | Age: 50
End: 2020-04-29

## 2020-04-29 ENCOUNTER — PATIENT OUTREACH (OUTPATIENT)
Dept: GASTROENTEROLOGY | Facility: CLINIC | Age: 50
End: 2020-04-29

## 2020-04-29 ENCOUNTER — VIRTUAL VISIT (OUTPATIENT)
Dept: GASTROENTEROLOGY | Facility: CLINIC | Age: 50
End: 2020-04-29
Attending: INTERNAL MEDICINE
Payer: COMMERCIAL

## 2020-04-29 DIAGNOSIS — R17 JAUNDICE: Primary | ICD-10-CM

## 2020-04-29 LAB
SARS-COV-2 RNA SPEC QL NAA+PROBE: NOT DETECTED
SPECIMEN SOURCE: NORMAL

## 2020-04-29 PROCEDURE — 40000114 ZZH STATISTIC NO CHARGE CLINIC VISIT

## 2020-04-29 NOTE — PROGRESS NOTES
"Iglesia Wolff is a 50 year old male who is being evaluated via a billable video visit.      The patient has been notified of following:     \"This video visit will be conducted via a call between you and your physician/provider. We have found that certain health care needs can be provided without the need for an in-person physical exam.  This service lets us provide the care you need with a video conversation.  If a prescription is necessary we can send it directly to your pharmacy.  If lab work is needed we can place an order for that and you can then stop by our lab to have the test done at a later time.    Video visits are billed at different rates depending on your insurance coverage.  Please reach out to your insurance provider with any questions.    If during the course of the call the physician/provider feels a video visit is not appropriate, you will not be charged for this service.\"    Patient has given verbal consent for Video visit? Yes    How would you like to obtain your AVS? Jozef    Patient would like the video invitation sent by: Text to cell phone: [phone],  [email or web address]    Will anyone else be joining your video visit? No         I have reviewed and updated the patient's allergies and medication list.    Concerns: Next steps.  Refills: None needed.     Hollie Hawk CMA          Video-Visit Details    Type of service:  Video Visit    Video Start Time: 7:51 AM  Video End Time: 8:09 AM    Originating Location (pt. Location): Home    Distant Location (provider location):  [facility]     Mode of Communication:  Video Conference via [facility]    INTERVENTIONAL ENDOSCOPY OUTPATIENT CLINIC CONSULT  DATE OF SERVICE: 4/29/2020  PHYSICIAN REQUESTING CONSULT: Tc Greenwood MD  Reason for Consultation: elevated LFTs    ASSESSMENT:  at is a 50 year old gentleman with a PMHx chronic back pain and obesity who was referred for transient elevated LFTs and jaundice with " associated indigestion type symptoms. LFTs have now nearly normalized from where they were 10 days prior. MRCP shows normal biliary tree with a 3 mm CBD. Cholelithiasis has been seen. Suspect that Ada had a passed choledocholithiasis. I think classic symptomatology was confounded due to a flare in his chronic back pain. Serologic work up for other causes of hepatitis are negative. He does clearly have fatty liver disease on imaging but steatohepatitis would not cause that dramatic a rise in his LFTs. We should follow his LFTs to ensure they normalize completely as he may have a background of SALAS.    I do not think there is any utility at this point in performing an EUS/ERCP given MRCP findings and improvement in LFTs. We will refer to general surgery for consideration of cholecystectomy. We can be made available for a same-session ERCP in the event that an IOC is performed and positive, which I explained to Ada.      RECOMMENDATIONS:  - Referral to general surgery for cholecystectomy for symptomatic gallstone disease  - Recheck LFTs in 2 weeks. If ALT still mildly elevated, referral to hepatology as he may have a background of SALAS    Thank you for this consultation.  It was a pleasure to participate in the care of this patient; please contact us with any further questions.  A total of 45 minutes was spent chart review and video conference evaluation with this patient, >50% of which was counseling and coordinating a management plan for this patient.     Logna Gilmore MD  [facility]  Division of Gastroenterology and Hepatology  [facility] [address], [place] [address]    ________________________________________________________________  HPI:  Ada is a 50 year old gentleman with a PMHx chronic back pain and obesity who was referred for elevated LFTs and jaundice. Ada reports that his symptoms started April 3rd/4th with bad indigestion/abdominal discomfort with  "associated loose stools and nausea. This coincided with a flare up of his chronic back pain. He was put on prednisone 40 mg for his back from 4/9-4/13 but stopped when he noticed his urine had become dark and his stool was tan colored. He had very bad heartburn while on the prednisone as well. Labs were checked on 4/14 which showed elevated LFTs (TB 3.5, ). However, his symptoms completely resolved including his urine and stool color a couple days later. U/S on 4/17 was difficult due to body habitus but no intrahepatic biliary dilation and steatosis was seen, associated splenomegaly, and cholelithiasis. Subsequent MRCP on 4/22 showed normal biliary tree with a 3 mm CBD. Cholelithiasis and steatosis redemonstrated.    Patient denies fevers, sweats, chills or weight loss.    Currently, he is completely asymptomatic without pain.    PMHx:  Past Medical History:   Diagnosis Date     Migraine     Rare, \"stress-related\"       PSurgHx:  Past Surgical History:   Procedure Laterality Date     SURGICAL HISTORY OF -   1979    Bilateral inguinal hernia repair       MEDS:  Current Outpatient Medications   Medication     losartan-hydrochlorothiazide (HYZAAR) 50-12.5 MG tablet     VIAGRA 50 MG OR TABS     No current facility-administered medications for this visit.      ALLERGIES:  No Known Allergies  FHx:  Family History   Problem Relation Age of Onset     Family History Negative Father      Family History Negative Mother      Coronary Stenting Brother        SOCIAL Hx:  Social History     Socioeconomic History     Marital status:      Spouse name: Not on file     Number of children: 0     Years of education: Not on file     Highest education level: Not on file   Occupational History     Employer: [employer]   Social Needs     Financial resource strain: Not on file     Food insecurity     Worry: Not on file     Inability: Not on file     Transportation needs     Medical: Not on file     Non-medical: Not " on file   Tobacco Use     Smoking status: Never Smoker     Smokeless tobacco: Never Used   Substance and Sexual Activity     Alcohol use: Yes     Comment: Rare, social use     Drug use: Not on file     Sexual activity: Yes   Lifestyle     Physical activity     Days per week: Not on file     Minutes per session: Not on file     Stress: Not on file   Relationships     Social connections     Talks on phone: Not on file     Gets together: Not on file     Attends Episcopal service: Not on file     Active member of club or organization: Not on file     Attends meetings of clubs or organizations: Not on file     Relationship status: Not on file     Intimate partner violence     Fear of current or ex partner: Not on file     Emotionally abused: Not on file     Physically abused: Not on file     Forced sexual activity: Not on file   Other Topics Concern     Not on file   Social History Narrative     Not on file       ROS: A comprehensive Review of Systems was asked and answered in the negative unless specifically commented upon in the HPI    Physical Exam  Gen: A&Ox3, NAD  HEENT:  no scleral icterus.  Lungs: no respiratory distress    LABS:  Orders Only on 04/24/2020   Component Date Value Ref Range Status     Hepatitis C Antibody 04/24/2020 Nonreactive  NR^Nonreactive Final    Comment: Assay performance characteristics have not been established for newborns,   infants, and children       Hepatitis A IgM Jolynn 04/24/2020 Nonreactive  NR^Nonreactive Final    Comment: IgM anti-HAV not detected. Does not exclude the possibility of recent exposure   to or infection with HAV.       Hep B Surface Agn 04/24/2020 Nonreactive  NR^Nonreactive Final     Hepatitis B Core Jolynn 04/24/2020 Nonreactive  NR^Nonreactive Final     Hepatitis B Surface Antibody 04/24/2020 0.00  <8.00 m[IU]/mL Final    Nonreactive, No antibody detected when the value is less than 8.00 m[IU]/mL.     EBV PCR Specimen Source 04/24/2020 Plasma   Final     EBV  Qualitative PCR 04/24/2020 Not Detected   Final    Comment: (Note)  NOT DETECTED - A negative result does not rule out the   presence of PCR inhibitors in the patient specimen or assay   specific nucleic acid in concentrations below the level of   detection by the assay.  INTERPRETIVE INFORMATION: Kurt Barr Virus by PCR  Test developed and characteristics determined by [facility]. See Compliance Statement A: [employer]/  Performed by [facility],  [address] [place] [id number] [phone]  [email or web address], Cali Shane MD, Lab. Director       TYLER interpretation 04/24/2020 Negative  NEG^Negative Final    Comment:                                    Reference range:  <1:40  NEGATIVE  1:40 - 1:80  BORDERLINE POSITIVE  >1:80 POSITIVE       Bilirubin Direct 04/24/2020 0.5* 0.0 - 0.2 mg/dL Final     Bilirubin Total 04/24/2020 1.4* 0.2 - 1.3 mg/dL Final     Albumin 04/24/2020 3.9  3.4 - 5.0 g/dL Final     Protein Total 04/24/2020 7.8  6.8 - 8.8 g/dL Final     Alkaline Phosphatase 04/24/2020 149  40 - 150 U/L Final     ALT 04/24/2020 135* 0 - 70 U/L Final     AST 04/24/2020 40  0 - 45 U/L Final     INR 04/24/2020 1.01  0.86 - 1.14 Final     IMAGING:  US ABDOMEN COMPLETE WITH DOPPLER COMPLETE 4/17/2020 11:11 AM     CLINICAL HISTORY: Elevated LFTs     TECHNIQUE: Complete abdominal ultrasound. Color flow with spectral  Doppler and waveform analysis performed.     COMPARISON: None.     FINDINGS:     GALLBLADDER: The lumen cannot be visualized, possibly related to  calcified stones. Negative sonographic More's sign. Diffuse  tenderness is noted.     BILE DUCTS: No biliary dilatation. The common duct is not visualized  due to body habitus.     LIVER: Homogeneously hyperechoic. No focal mass.      RIGHT KIDNEY: Normal size. Normal echogenicity with no hydronephrosis  or mass.      LEFT KIDNEY: Normal size. No hydronephrosis.     SPLEEN: Mildly enlarged at 15.7 cm in length.     PANCREAS: The pancreas is  largely obscured by overlying gas.     AORTA: Normal in caliber.     IVC: Normal where visualized.     No ascites.     ABDOMINAL DUPLEX: The main hepatic artery is not seen due to body  habitus. The main portal vein, left portal vein, and right portal vein  are normal in appearance with antegrade flow. The splenic vein is  difficult to see at the tail due to body habitus. The left hepatic  vein, middle hepatic vein, right hepatic vein, and IVC are patent.  There is portalization of flow in the hepatic veins.                                                                      IMPRESSION:  1.  Hepatic parenchymal disease. Consider steatosis.  2.  There is splenomegaly.  3.  There is portalization of flow in the hepatic veins rasta which  could be seen with hepatic parenchymal disease or cardiac disease.  There is normal flow direction in the portal venous system. The  hepatic artery is not seen due to body habitus.  4.  Cholelithiasis is suspected. There is diffuse tenderness without a  sonographic More's sign.     MR ABDOMEN MRCP WITH AND WITHOUT CONTRAST April 22, 2020 9:58 AM      HISTORY: Jaundice.     TECHNIQUE: Routine MR liver/pancreas protocol including axial and  coronal MRCP sequences. 2D and 3D reconstruction performed by MR  technologist including MIP reconstruction and slab cholangiograms. If  performed with contrast, additional dynamic T1 post IV contrast  images.  CONTRAST: 13 mL Gadavist.     FINDINGS:      MRCP: Bile ducts are normal in caliber. The common bile duct measures  3 mm in diameter. No choledocholithiasis or obstructing mass. The  pancreatic duct is normal in caliber measuring less than 2 mm in  diameter.     LIVER: Diffuse hepatic steatosis. No liver lesions.     PANCREAS: No evidence of mass or pancreatitis.     ADDITIONAL FINDINGS: Numerous gallstones in the gallbladder.     Small benign renal cysts. Normal spleen and adrenal glands. Small  martin hepatic lymph nodes are likely  reactive. No ascites.                                                                         IMPRESSION:  1.  No bile duct dilation.  2.  Cholelithiasis. No evidence of choledocholithiasis or  cholecystitis.  3.  Hepatic steatosis.    Logan Gilmore MD

## 2020-04-29 NOTE — PROGRESS NOTES
Care Coordination   [facility]     As discussed today by Dr. Gilmore virtual visit, we will plan the followin.  LFT recheck in two weeks    2.  Cancel OR procedure with Dr. Gilmore    3.  Referral to general surgery for possible cholecystectomy    Orders placed and patient will be contacted with above appointments      ________________________________      Danette GALLARDON, HNBC, STAR-T  RN Care Coordinator  Ph: [phone]  FAX: [phone]

## 2020-04-29 NOTE — PROGRESS NOTES
A referral was placed for the patient to meet with General Surgery regarding a gallbladder concern.    Video consult arranged tomorrow, 4/30, at 1000.  Records are available in Epic.

## 2020-04-30 ENCOUNTER — VIRTUAL VISIT (OUTPATIENT)
Dept: SURGERY | Facility: CLINIC | Age: 50
End: 2020-04-30
Payer: COMMERCIAL

## 2020-04-30 ENCOUNTER — PATIENT OUTREACH (OUTPATIENT)
Dept: SURGERY | Facility: CLINIC | Age: 50
End: 2020-04-30

## 2020-04-30 VITALS — HEIGHT: 70 IN | BODY MASS INDEX: 42.23 KG/M2 | WEIGHT: 295 LBS

## 2020-04-30 DIAGNOSIS — K80.20 GALLSTONES: Primary | ICD-10-CM

## 2020-04-30 DIAGNOSIS — Z53.9 ERRONEOUS ENCOUNTER--DISREGARD: Primary | ICD-10-CM

## 2020-04-30 RX ORDER — CEFAZOLIN SODIUM IN 0.9 % NACL 3 G/100 ML
3 INTRAVENOUS SOLUTION, PIGGYBACK (ML) INTRAVENOUS
Status: CANCELLED | OUTPATIENT
Start: 2020-04-30

## 2020-04-30 RX ORDER — CEFAZOLIN SODIUM 1 G/50ML
1 INJECTION, SOLUTION INTRAVENOUS SEE ADMIN INSTRUCTIONS
Status: CANCELLED | OUTPATIENT
Start: 2020-04-30

## 2020-04-30 RX ORDER — INDOCYANINE GREEN AND WATER 25 MG
2.5 KIT INJECTION ONCE
Status: CANCELLED | OUTPATIENT
Start: 2020-04-30 | End: 2020-04-30

## 2020-04-30 ASSESSMENT — MIFFLIN-ST. JEOR: SCORE: 2199.36

## 2020-04-30 ASSESSMENT — PAIN SCALES - GENERAL: PAINLEVEL: NO PAIN (0)

## 2020-04-30 NOTE — PATIENT INSTRUCTIONS
You met with Dr. Merrick Moncada.      Today's visit instructions:    Recheck liver function tests in 2 weeks.    Reminder:  Surgery Requirements  1. Your surgery will be at [address] [place] [address] or [facility]  2. You will need to arrive 1 1/2 to 2 hours early based on the location of your surgery.  3. You will need someone to drive you home (over 18 years old) and stay with you for 24 hours after the procedure  4. You will need a preop physical with your regular doctor (or PAC if requested by your surgeon) within 30 days of surgery- closer is always better  5. Stop any blood thinners, vitamins, minerals, or herbal supplements 5 days before surgery.  If you are taking a prescribed blood thinner please let us know for specific instructions  6. Fasting- a nurse from [facility] will call you 1-2 days before surgery to confirm your procedure and tell you when to stop eating and drinking  7. Wash with the soap the night before surgery and morning of surgery. See instructions in the Surgery Packet.  8. If you would like a procedure estimate please call Saundra DIAS Financial Counselor, [phone].    If you have questions please contact Madhu RN or Steffi RN during regular clinic hours, Monday through Friday 7:30 AM - 4:00 PM, or you can contact us via [facility] at anytime.       If you have urgent needs after-hours, weekends, or holidays please call the hospital at [phone] and ask to speak with our on-call General Surgery Team.    Appointment [phone], option #1   Nurse Advice (Madhu or Steffi): [phone]   Surgery Scheduler (Sharon): [phone]  Fax: [phone]    After your Robotic Cholecystectomy          Incision care     You may take a shower the day after surgery. Carefully wash your incision with soap and water. Do not submerge yourself in water (bath, whirlpool, hot tub, pool, lake) for 14 days after surgery.     Remove the bandage the day after surgery, but leave the  medical tape (Steri-Strips) or glue in place. These will loosen and fall off on their own 5 to 7 days after surgery.       Always wash your hands before touching your incisions or removing bandages.     It is not unusual to form a collection of fluid or blood under your incision that may feel firm or squishy- it can take several weeks to months for your body to reabsorb it.  At times, it may even drain.  If that should happen keep the area clean with soap, water,  and cover with a clean gauze dressing. You can change this daily or as needed.       Other medicines     Wait to start aspirin or blood thinners until the day after surgery. You can continue your regular medicines at your normal time the day after surgery.      Your pain medicine may cause constipation (hard, dry stools). To help with this, take the stool softener your doctor gave you or an over-the-counter stool softener or laxative. You can stop taking this when you are no longer taking pain medicine and your bowel movements are back to normal.      For pain or discomfort     Take the narcotic pain medicine your doctor gave you as needed and as instructed on the bottle. If you prefer to use over-the-counter medication, use acetaminophen (Tylenol) or ibuprofen (Advil, Motrin) as instructed on the box. Do not take Tylenol if it is in your narcotic pain medication.     Use an ice pack on your abdomen (belly) for 20 minutes at a time as needed for the first 24 hours. Be sure to protect your skin by putting a cloth between the ice pack and your skin.     After 24 hours you can switch to heat for 20 minutes as needed. Be sure to protect your skin by putting a cloth between the heat pack and your skin.       Activities     No driving until you feel it s safe to do so. Don t drive while taking narcotic pain medicine.     Don t lift anything heavier than 20 pounds for 3 to 4 weeks after surgery.      Special equipment     None     Diet     You can eat your  regular meals after surgery.      When to call the doctor   Call your doctor if you have:     A fever above 101 F (38.3 C) (taken under the tongue), or a fever or chills lasting more than a day.     Redness at the incision site.     Any fluid or blood draining from the incision, especially if it smells bad.      Severe pain that doesn t improve with pain medicine.          We will call you 2 to 4 days after surgery to review this handout, answer questions and help arrange after-surgery care. If you have questions or concerns, please call [phone] during regular office hours. If you need to call after business hours, call [phone] and ask to page the surgeon on-call.         Transversus Abdominis Plane (TAP) Pain Block      What is a TAP block?   A TAP block can help you manage your pain after surgery. TAP stands for transversus abdominis plane, which is a muscle layer in your abdomen (belly). The TAP block uses numbing medicine similar to Novocaine to block pain near the site of your surgery.       Why get a TAP block?     To better manage your pain after surgery. A tap block will help keep the pain from getting severe and out of control.     To block pain signals from the nerve, which helps decrease pain after surgery.     To help you sleep, easily breathe deeply, walk and visit with others.      How is it done?   You will lie still on a table. We will use an ultrasound machine to help us see the correct muscle layer of your abdomen. Then, we ll use a needle to inject the medicine. We may also give you some sleep medicine to lessen the pain of the injection.       The procedure takes between 5 and 15 minutes. It is usually done right before surgery, but will sometimes be after. It depends on your surgery and care needs.      What can I expect?     You may feel numbness, tingling or a heaviness in your abdomen.      You may have pain control up to 72 hours after surgery.     The TAP block may not lessen all  of your surgery pain. But most patients feel 50 to 75 percent less pain than without the block.       Tell your nurse if you have:     Numbness or tingling in areas other than where the injection was     Blurry vision     Ringing in your ears     A metallic taste in your mouth

## 2020-04-30 NOTE — PROGRESS NOTES
"Iglesia Wolff is a 50 year old male who is being evaluated via a billable video visit.      The patient has been notified of following:     \"This video visit will be conducted via a call between you and your physician/provider. We have found that certain health care needs can be provided without the need for an in-person physical exam.  This service lets us provide the care you need with a video conversation.  If a prescription is necessary we can send it directly to your pharmacy.  If lab work is needed we can place an order for that and you can then stop by our lab to have the test done at a later time.    Video visits are billed at different rates depending on your insurance coverage.  Please reach out to your insurance provider with any questions.    If during the course of the call the physician/provider feels a video visit is not appropriate, you will not be charged for this service.\"    Patient has given verbal consent for Video visit? Yes    How would you like to obtain your AVS? [facility]    Patient would like the video invitation sent by: Text to cell phone: [phone]    Will anyone else be joining your video visit? No        Video-Visit Details    Type of service:  Video Visit  Iglesia Wolff is a 50 year old male who presented to his PCP with light colored stool and some abdominal discomfort with subsequent work up finding probable passed CD stone. Presently without pain. Seen by GI team - see that note.  Diet tolerance:  good    LFT's:  abnormal    Image studies included:  Ultrasound  Findings:  Gallstones seen    Past medical and surgical history, medications, allergies, family history, and social history were reviewed with the patient. Obese, HTN, some back issues. +Family history for gallstones. Works as  for [employer].    Ultrasound and lab studies reviewed.    Impression:  Symptomatic cholelithiasis with passed CD stone, question underlying liver disease. Work up pending, I.e. " repeat LFTs in 2 weeks. Of concern is reverse flow noted on ultrasound.  Recommendation:  Laparoscopic Cholecystectomy robot assisted if cleared by GI team. May need further work up by hepatology.    Low to nonfat diet until the patient undergoes surgery.    A full discussion regarding the alternatives, risks, goals, and potential complications for this surgery was completed today.  The patient understood that the potential problems included but are not limited to:  Infection, bleeding, bile leak, injury to structures about the gallbladder, possible common duct stone requiring further procedures. Most current review of literature confirm the more common specific risks related to laparoscopic cholecystectomy include bile duct injury (3/1000), bile leak (10/1000), retained common bile duct stone (10/1000), postcholecystectomy diarrhea (1-2%) and these complications may require additional treatment.    The patient verbally expressed understanding, was given the opportunity for questions, and gives full informed consent for the procedure.      Today the patient was instructed on the need for a preoperative H&P, NPO status prior to surgery, and the need to stop anticoagulants prior to surgery.  Additional educational material, soap, and instructions will be mailed out to the patients home.    The total time spent with this patient on video call and in reviewing the chart was 30 minutes.  Of this time, greater than 50% was spent counseling and coordinating care.            Originating Location (pt. Location): Home    Distant Location (provider location):  [facility] GENERAL SURGERY     Platform used for Video Visit: Mary SAMANO

## 2020-04-30 NOTE — NURSING NOTE
Chief Complaint   Patient presents with     Consult     new [facility] pt referred from Dr. Gilmore       There were no vitals filed for this visit.    There is no height or weight on file to calculate BMI.    Luis Dunaway, EMT

## 2020-04-30 NOTE — PROGRESS NOTES
Pre and Post op Patient Education/Teaching Flowsheet  Relevant Diagnosis:  Gallbladder issues  Teaching Topic:  Pre and post op teaching  Person(s) Involved in teaching:  Patient     Motivation Level:  Asks Questions:  Yes  Eager to Learn:  Yes  Cooperative:  Yes  Receptive (willing/able to accept information):  Yes  Any cultural factors/[employer] beliefs that may influence understanding or compliance?  No    Patient/caregiver/family demonstrates understanding of the following:  Reason for the appointment, diagnosis, and treatment plan:  Yes  Patient demonstrates understanding of the following:  Pre-op bowel prep:  N/A  Post-op pain management recommendations (medications, ice compress, binder/athletic supporter (if applicable), etc.:  Yes  Inguinal hernia patients:  Post-op urinary retention- discussed signs/symptoms and visit to ER for Jesus catheter placement and to stay in place for at least 48 hours:  NA  Restrictions:  Yes  Medications to take the day of surgery:  Per PCP  Blood thinner medications discussed and when to stop (if applicable):  Yes  Wound care:  Yes  Diabetes medication management (if applicable):  Per PCP  Which situations necessitate calling provider and whom to contact:  Discussed how to contact the hospital, nurse, and clinic scheduling staff if necessary      Date and time of surgery:  Yes  Location of surgery: [address] [place] [address] or [facility]  History and Physical and any other testing necessary prior to surgery:  Yes. H&P w/PCP.  Required time line for completion of History and Physical and any pre-op testing:  Yes  Discuss need for someone to drive patient home and stay with them for 24 hours:  Yes  Pre-op showering/scrub information with Surgical Scrub:  Yes  NPO Guidelines:  NPO per Anesthesia Guidelines    Infection Prevention: Patient demonstrates understanding of the following:  Patient instructed on hand hygiene:  Yes  Surgical procedure site care will be  taught and will be reviewed at the time of discharge  Signs and symptoms of infection taught:  Yes  Wound care reviewed and will be taught at the time of discharge  Central venous catheter care will be taught at the time of discharge (if applicable)    Post-op follow-up:  Instructional materials used/given/mailed:  [facility] [facility] Booklet, post op teaching sheet, Map, Soap, and arrival/location information    Surgical instructions mailed to patient

## 2020-05-04 ENCOUNTER — VIRTUAL VISIT (OUTPATIENT)
Dept: NEUROSURGERY | Facility: CLINIC | Age: 50
End: 2020-05-04
Attending: PHYSICIAN ASSISTANT
Payer: COMMERCIAL

## 2020-05-04 DIAGNOSIS — G89.29 CHRONIC BILATERAL LOW BACK PAIN WITHOUT SCIATICA: Primary | ICD-10-CM

## 2020-05-04 DIAGNOSIS — M54.50 CHRONIC BILATERAL LOW BACK PAIN WITHOUT SCIATICA: Primary | ICD-10-CM

## 2020-05-04 PROCEDURE — 99203 OFFICE O/P NEW LOW 30 MIN: CPT | Mod: 95 | Performed by: PHYSICIAN ASSISTANT

## 2020-05-04 NOTE — PROGRESS NOTES
"Iglesia Wolff is a 50 year old male who is being evaluated via a billable telephone visit.      Due to COVID-19 this visit has been performed over the phone verses face to face.     The patient has been notified of following:     \"This telephone visit will be conducted via a call between you and your physician/provider. We have found that certain health care needs can be provided without the need for a physical exam.  This service lets us provide the care you need with a short phone conversation.  If a prescription is necessary we can send it directly to your pharmacy.  If lab work is needed we can place an order for that and you can then stop by our lab to have the test done at a later time.    If during the course of the call the physician/provider feels a telephone visit is not appropriate, you will not be charged for this service.\"     Iglesia Wolff complains of    Chief Complaint   Patient presents with     Neurologic Problem     Chronic LBP w right side sciatica deos not radiate down leg       I have reviewed and updated the patient's Past Medical History, Social History, Family History and Medication List.    ALLERGIES  Patient has no known allergies.    Additional provider notes:    Iglesia Wolff is a 50 year old male who was referred to us for evaluation of low back pain.  The patient states that for the past 20 or so years he has had on-and-off episodes of back pain once or twice a year.  About a month ago he had an episode that has lasted longer than usual, but now after a month he is nearly asymptomatic.  Usually takes a short course of prednisone and this resolves the symptoms sooner.  He denies any weakness denies any bowel or bladder symptoms denies any saddle anesthesia.  He has not had any lumbar imaging recently.  He is wondering if there may be some type of a fix for his symptoms, but again states now he is nearly asymptomatic at this time.    Assessment    Low back pain    Plan:    I " recommended that the patient not proceed with any imaging at this time given that he is asymptomatic if his symptoms should progress or worsen or become more frequent or consistent we certainly can reevaluate at that time but for now he can continue with the conservative therapies that he is doing and occasional Medrol Dosepaks once or twice a year certainly reasonable if this becomes more frequent or more bothersome he should contact our clinic and we will reevaluate.    Phone call duration: 5 minutes  Start Time 1055  End Time 11 AM    Iveth Bruce PA-C    Patient provided verbal consent for the phone visit today.

## 2020-05-07 ENCOUNTER — MYC MEDICAL ADVICE (OUTPATIENT)
Dept: FAMILY MEDICINE | Facility: CLINIC | Age: 50
End: 2020-05-07

## 2020-05-07 DIAGNOSIS — R74.01 TRANSAMINITIS: ICD-10-CM

## 2020-05-07 DIAGNOSIS — R73.03 PREDIABETES: ICD-10-CM

## 2020-05-07 DIAGNOSIS — I10 BENIGN ESSENTIAL HYPERTENSION: ICD-10-CM

## 2020-05-07 DIAGNOSIS — E66.01 MORBID OBESITY (H): Primary | ICD-10-CM

## 2020-05-07 DIAGNOSIS — E78.5 HYPERLIPIDEMIA LDL GOAL <100: ICD-10-CM

## 2020-05-07 RX ORDER — LOSARTAN POTASSIUM AND HYDROCHLOROTHIAZIDE 12.5; 5 MG/1; MG/1
TABLET ORAL
Qty: 135 TABLET | Refills: 0 | Status: SHIPPED | OUTPATIENT
Start: 2020-05-07 | End: 2020-05-21

## 2020-05-07 NOTE — TELEPHONE ENCOUNTER
Recent blood pressure readings look fine.  Some blood pressures are not at goal.  I would recommend he increase the dose of blood pressure medicine losartan/hydrochlorothiazide 50/12.5 by another half tablet so he will take 1 tablet in the morning and half tablet in the evening.  Hold if with this increase if he gets dizzy or lightheaded.  He will need to check his labs in the next 2 weeks specially preop.  He will need to start on statin such as atorvastatin will start on low-dose 20 mg once daily due to his transaminitis.  Statins can sometimes cause muscle aches and can cause a slight rise bump in the liver test if any occur will notify MD.  We will repeat his lipid panel fasting comprehensive panel, GGT and CBC preop.  Patient would need to call and schedule appointment for preop physical.  Patient will need to call and schedule his labs fasting prior to his preop visit.

## 2020-05-07 NOTE — TELEPHONE ENCOUNTER
Dr Greenwood,  Please see below MyChart message and advise.  Triage can advise on preop question  Thanks,  Preeti PALACIO RN

## 2020-05-07 NOTE — TELEPHONE ENCOUNTER
Dr Greenwood,   Patient needs new Rx for 1.5 tablets daily   Pended new Rx if you approve  Thanks,  Preeti PALACIO RN

## 2020-05-12 ENCOUNTER — TELEPHONE (OUTPATIENT)
Dept: FAMILY MEDICINE | Facility: CLINIC | Age: 50
End: 2020-05-12

## 2020-05-12 DIAGNOSIS — I10 BENIGN ESSENTIAL HYPERTENSION: Primary | ICD-10-CM

## 2020-05-12 RX ORDER — HYDROCHLOROTHIAZIDE 12.5 MG/1
TABLET ORAL
Qty: 135 TABLET | Refills: 0 | Status: SHIPPED | OUTPATIENT
Start: 2020-05-12 | End: 2020-08-05

## 2020-05-12 RX ORDER — LOSARTAN POTASSIUM 50 MG/1
TABLET ORAL
Qty: 135 TABLET | Refills: 0 | Status: SHIPPED | OUTPATIENT
Start: 2020-05-12 | End: 2020-08-05

## 2020-05-12 NOTE — TELEPHONE ENCOUNTER
Reason for call:  Other   Patient called regarding (reason for call): Hyzaar is unavailable at [facility].  Losartan and hydrochlorothiazide must be rx'd as separate agents. Pt requesting to change pharmacy to [facility].    Additional comments: Pt is out of medication    Phone number to reach patient:  Cell number on file:    Telephone Information:   Mobile [phone]       Best Time:  asap    Can we leave a detailed message on this number?  YES

## 2020-05-12 NOTE — TELEPHONE ENCOUNTER
Prescription approved per [facility].    Fran VALDOVINOS RN

## 2020-05-14 DIAGNOSIS — E66.01 MORBID OBESITY (H): ICD-10-CM

## 2020-05-14 DIAGNOSIS — R73.03 PREDIABETES: ICD-10-CM

## 2020-05-14 DIAGNOSIS — R74.01 TRANSAMINITIS: ICD-10-CM

## 2020-05-14 DIAGNOSIS — I10 BENIGN ESSENTIAL HYPERTENSION: ICD-10-CM

## 2020-05-14 DIAGNOSIS — E78.5 HYPERLIPIDEMIA LDL GOAL <100: ICD-10-CM

## 2020-05-14 DIAGNOSIS — R17 JAUNDICE: ICD-10-CM

## 2020-05-14 LAB
ALBUMIN SERPL-MCNC: 3.6 G/DL (ref 3.4–5)
ALP SERPL-CCNC: 88 U/L (ref 40–150)
ALT SERPL W P-5'-P-CCNC: 72 U/L (ref 0–70)
ANION GAP SERPL CALCULATED.3IONS-SCNC: 5 MMOL/L (ref 3–14)
AST SERPL W P-5'-P-CCNC: 37 U/L (ref 0–45)
BILIRUB DIRECT SERPL-MCNC: 0.3 MG/DL (ref 0–0.2)
BILIRUB SERPL-MCNC: 1.5 MG/DL (ref 0.2–1.3)
BUN SERPL-MCNC: 15 MG/DL (ref 7–30)
CALCIUM SERPL-MCNC: 9.2 MG/DL (ref 8.5–10.1)
CHLORIDE SERPL-SCNC: 105 MMOL/L (ref 94–109)
CHOLEST SERPL-MCNC: 148 MG/DL
CO2 SERPL-SCNC: 29 MMOL/L (ref 20–32)
CREAT SERPL-MCNC: 0.93 MG/DL (ref 0.66–1.25)
ERYTHROCYTE [DISTWIDTH] IN BLOOD BY AUTOMATED COUNT: 13.9 % (ref 10–15)
GFR SERPL CREATININE-BSD FRML MDRD: >90 ML/MIN/{1.73_M2}
GGT SERPL-CCNC: 84 U/L (ref 0–75)
GLUCOSE SERPL-MCNC: 102 MG/DL (ref 70–99)
HCT VFR BLD AUTO: 45.8 % (ref 40–53)
HDLC SERPL-MCNC: 39 MG/DL
HGB BLD-MCNC: 15.9 G/DL (ref 13.3–17.7)
LDLC SERPL CALC-MCNC: 88 MG/DL
MCH RBC QN AUTO: 30.5 PG (ref 26.5–33)
MCHC RBC AUTO-ENTMCNC: 34.7 G/DL (ref 31.5–36.5)
MCV RBC AUTO: 88 FL (ref 78–100)
NONHDLC SERPL-MCNC: 109 MG/DL
PLATELET # BLD AUTO: 207 10E9/L (ref 150–450)
POTASSIUM SERPL-SCNC: 4 MMOL/L (ref 3.4–5.3)
PROT SERPL-MCNC: 7.7 G/DL (ref 6.8–8.8)
RBC # BLD AUTO: 5.22 10E12/L (ref 4.4–5.9)
SODIUM SERPL-SCNC: 139 MMOL/L (ref 133–144)
TRIGL SERPL-MCNC: 104 MG/DL
WBC # BLD AUTO: 7.9 10E9/L (ref 4–11)

## 2020-05-14 PROCEDURE — 85027 COMPLETE CBC AUTOMATED: CPT | Performed by: INTERNAL MEDICINE

## 2020-05-14 PROCEDURE — 82248 BILIRUBIN DIRECT: CPT | Performed by: INTERNAL MEDICINE

## 2020-05-14 PROCEDURE — 82977 ASSAY OF GGT: CPT | Performed by: INTERNAL MEDICINE

## 2020-05-14 PROCEDURE — 80053 COMPREHEN METABOLIC PANEL: CPT | Performed by: INTERNAL MEDICINE

## 2020-05-14 PROCEDURE — 80061 LIPID PANEL: CPT | Performed by: INTERNAL MEDICINE

## 2020-05-14 PROCEDURE — 36415 COLL VENOUS BLD VENIPUNCTURE: CPT | Performed by: INTERNAL MEDICINE

## 2020-05-14 NOTE — RESULT ENCOUNTER NOTE
Pat-your labs showed improving numbers and liver function tests are trending downward and your cholesterol LDL is down to 88 from 183 which is the bad cholesterol.  Please schedule a follow-up virtual video visit to discuss further your labs and check on your symptoms..  Dr. Greenwood

## 2020-05-14 NOTE — LETTER
May 15, 2020      Iglesia Wolff  [address] [place] [id number]        Dear ,    We are writing to inform you of your test results.    Pat-your labs showed improving numbers and liver function tests are trending downward and your cholesterol LDL is down to 88 from 183 which is the bad cholesterol.  Please schedule a follow-up virtual video visit to discuss further your labs and check on your symptoms..     Resulted Orders   Lipid panel reflex to direct LDL Fasting   Result Value Ref Range    Cholesterol 148 <200 mg/dL    Triglycerides 104 <150 mg/dL    HDL Cholesterol 39 (L) >39 mg/dL    LDL Cholesterol Calculated 88 <100 mg/dL      Comment:      Desirable:       <100 mg/dl    Non HDL Cholesterol 109 <130 mg/dL   GGT   Result Value Ref Range    GGT 84 (H) 0 - 75 U/L   CBC with platelets   Result Value Ref Range    WBC 7.9 4.0 - 11.0 10e9/L    RBC Count 5.22 4.4 - 5.9 10e12/L    Hemoglobin 15.9 13.3 - 17.7 g/dL    Hematocrit 45.8 40.0 - 53.0 %    MCV 88 78 - 100 fl    MCH 30.5 26.5 - 33.0 pg    MCHC 34.7 31.5 - 36.5 g/dL    RDW 13.9 10.0 - 15.0 %    Platelet Count 207 150 - 450 10e9/L   Comprehensive metabolic panel (BMP + Alb, Alk Phos, ALT, AST, Total. Bili, TP)   Result Value Ref Range    Sodium 139 133 - 144 mmol/L    Potassium 4.0 3.4 - 5.3 mmol/L    Chloride 105 94 - 109 mmol/L    Carbon Dioxide 29 20 - 32 mmol/L    Anion Gap 5 3 - 14 mmol/L    Glucose 102 (H) 70 - 99 mg/dL    Urea Nitrogen 15 7 - 30 mg/dL    Creatinine 0.93 0.66 - 1.25 mg/dL    GFR Estimate >90 >60 mL/min/[1.73_m2]      Comment:      Non  GFR Calc  Starting 12/18/2018, serum creatinine based estimated GFR (eGFR) will be   calculated using the Chronic Kidney Disease Epidemiology Collaboration   (CKD-EPI) equation.      GFR Estimate If Black >90 >60 mL/min/[1.73_m2]      Comment:       GFR Calc  Starting 12/18/2018, serum creatinine based estimated GFR (eGFR) will be   calculated using the  Chronic Kidney Disease Epidemiology Collaboration   (CKD-EPI) equation.      Calcium 9.2 8.5 - 10.1 mg/dL    Bilirubin Total 1.5 (H) 0.2 - 1.3 mg/dL    Albumin 3.6 3.4 - 5.0 g/dL    Protein Total 7.7 6.8 - 8.8 g/dL    Alkaline Phosphatase 88 40 - 150 U/L    ALT 72 (H) 0 - 70 U/L    AST 37 0 - 45 U/L   Bilirubin direct   Result Value Ref Range    Bilirubin Direct 0.3 (H) 0.0 - 0.2 mg/dL       If you have any questions or concerns, please call the clinic at the number listed above.       Sincerely,      Tc Greenwood MD / ravin

## 2020-05-18 ENCOUNTER — PATIENT OUTREACH (OUTPATIENT)
Dept: GASTROENTEROLOGY | Facility: CLINIC | Age: 50
End: 2020-05-18

## 2020-05-18 ENCOUNTER — PATIENT OUTREACH (OUTPATIENT)
Dept: SURGERY | Facility: CLINIC | Age: 50
End: 2020-05-18

## 2020-05-18 DIAGNOSIS — R17 JAUNDICE: Primary | ICD-10-CM

## 2020-05-18 NOTE — PROGRESS NOTES
Recent LFT's reviewed by Dr. Moncada.  He is not recommending cholecystectomy at this time.  He agree's with Dr. Gilmore's recommendation of consulting with Hepatology (4/29 note: - Recheck LFTs in 2 weeks. If ALT still mildly elevated, referral to hepatology as he may have a background of SALAS).    Will defer referrals to Dr. Gilmore.  In-basket message sent to them.

## 2020-05-19 ENCOUNTER — PATIENT OUTREACH (OUTPATIENT)
Dept: SURGERY | Facility: CLINIC | Age: 50
End: 2020-05-19

## 2020-05-19 ENCOUNTER — TELEPHONE (OUTPATIENT)
Dept: GASTROENTEROLOGY | Facility: CLINIC | Age: 50
End: 2020-05-19

## 2020-05-19 ENCOUNTER — TELEPHONE (OUTPATIENT)
Dept: SURGERY | Facility: CLINIC | Age: 50
End: 2020-05-19

## 2020-05-19 DIAGNOSIS — K80.20 GALLSTONES: Primary | ICD-10-CM

## 2020-05-19 RX ORDER — INDOCYANINE GREEN AND WATER 25 MG
2.5 KIT INJECTION ONCE
Status: CANCELLED | OUTPATIENT
Start: 2020-05-19 | End: 2020-05-19

## 2020-05-19 RX ORDER — CEFAZOLIN SODIUM 1 G/50ML
1 INJECTION, SOLUTION INTRAVENOUS SEE ADMIN INSTRUCTIONS
Status: CANCELLED | OUTPATIENT
Start: 2020-05-19

## 2020-05-19 RX ORDER — CEFAZOLIN SODIUM IN 0.9 % NACL 3 G/100 ML
3 INTRAVENOUS SOLUTION, PIGGYBACK (ML) INTRAVENOUS
Status: CANCELLED | OUTPATIENT
Start: 2020-05-19

## 2020-05-19 NOTE — TELEPHONE ENCOUNTER
----- Message from Merrick Moncada MD sent at 5/19/2020  9:59 AM CDT -----  Regarding: FW: Referral    ----- Message -----  From: Logan Gilmore MD  Sent: 5/19/2020   8:37 AM CDT  To: Thomas Michael Leventhal, MD, #  Subject: RE: Referral                                     Awesome. Thanks Mark.  ----- Message -----  From: Leventhal, Thomas Michael, MD  Sent: 5/18/2020   3:52 PM CDT  To: Merrick Moncada MD, Logan Gilmore MD  Subject: FW: Referral                                     Greetings all,      I will certainly see this patient in clinic if you would like, I have reviewed all of his information and here is my assessment  - His acute elevation in liver tests was not related to fatty liver disease - this isn't the natural history of SALAS/NAFLD.  His ALT in the 70s now could be consistent with hepatic inflammation from SALAS  - The significant GGT elevation with time of symptoms then normalization now strongly suggests biliary disease  - The findings of changed flow in the hepatic veins is non specific and may be related to fatty liver disease or right heart disease.  - He has normal platelets and normal INR, which suggests against severe portal hypertension and against impaired synthetic function.  - Noted that his spleen is enlarged, however, objective measures of portal hypertension and impaired synthetic function are normal (INR, platelets) so this does not serve as a contra-indication to surgical intervention.      My recommendation will be a cholecystectomy with liver biopsy performed during the procedure.       I have reached out to my clinic nurse to schedule him  ----- Message -----  From: Logan Gilmore MD  Sent: 5/18/2020   2:42 PM CDT  To: Thomas Michael Leventhal, MD, Danette Beaver RN  Subject: RE: Referral                                     Ok, let's refer him to hepatology. Preferably Mark if he has time since I messaged him about this patient before.    ----- Message -----  From:  Beaver, Danette, RN  Sent: 5/18/2020   2:10 PM CDT  To: Logan Gilmore MD  Subject: FW: Referral                                     See message below  Thanks  MK  ----- Message -----  From: Steffi Cespedes RN  Sent: 5/18/2020   2:02 PM CDT  To: Danette Beaver RN  Subject: Referral                                         Dr. Mando Taylor referred this patient to General Surgery, Dr. Moncada, for consideration of cholecystectomy.  Dr. Moncada reviewed the patients current labs and does not recommend surgery at this time.  He agreed with Dr. Gilmore's recommendation of consulting with GI then proceeding with surgery if cleared by Hepatology.    Dr. Moncada would like to defer the referral to your team.    4/29- Recheck LFTs in 2 weeks. If ALT still mildly elevated, referral to hepatology as he may have a background of SALAS    Thanks,  Steffi Cespedes RN, BSN, CNOR, RNFA, CBCN  [facility]

## 2020-05-19 NOTE — TELEPHONE ENCOUNTER
[facility] Call Center    Phone Message    May a detailed message be left on voicemail: yes     Reason for Call: Other: Patient returning clinic's call to schedule consult with Dr. Leventhal in 1-2 weeks. Provider's next available is 3 weeks out. Patient scheduled for telephone call with Dr. Leventhal on 6/8/20 and added to cancellation list. Please call patient if needing to be seen sooner. Thank you,   Fiordaliza Burdick on 5/19/2020 at 10:52 AM   Action Taken: Message routed to:  [facility] HEPATOLOGY    Travel Screening: Negative

## 2020-05-19 NOTE — PROGRESS NOTES
Spoke with patient and relayed information regarding proceeding with robotic cholecystectomy and core liver biopsy.      Our  will reach out to the patient and plan in June 2020.

## 2020-05-19 NOTE — TELEPHONE ENCOUNTER
RECORDS RECEIVED FROM: Dr. Logan Gilmore- [facility]    Appt Date: 6/8/2020   NOTES STATUS DETAILS   OFFICE NOTE from referring provider Internal 5/18/2020 Referral   4/29/2020 Virtual visit with Dr. Gilmore    OFFICE NOTES from other specialists Internal 4/14/2020 Office visit with Dr. Tc Greenwood ([facility])   DISCHARGE SUMMARY from hospital N/A    MEDICATION LIST Internal    LIVER BIOSPY (IF APPLICABLE)      PATHOLOGY REPORTS  N/A    IMAGING     ENDOSCOPY (IF AVAILABLE) N/A    COLONOSCOPY (IF AVAILABLE) N/A    ULTRASOUND LIVER N/A    CT OF ABDOMEN Internal MR Abdomen MRCP: 4/22/2020  US Abdomen: 4/17/2020   MRI OF LIVER N/A    FIBROSCAN, US ELASTOGRAPHY, FIBROSIS SCAN, MR ELASTOGRAPHY N/A    LABS     HEPATIC PANEL (LIVER PANEL) Internal 4/24/2020   BASIC METABOLIC PANEL N/A    COMPLETE METABOLIC PANEL Internal 5/14/2020, 4/14/2020   COMPLETE BLOOD COUNT (CBC) Internal 5/14/2020, 12/16/19   INTERNATIONAL NORMALIZED RATIO (INR) Internal 4/24/2020   HEPATITIS C ANTIBODY Internal 4/29/2020   HEPATITIS C VIRAL LOAD/PCR N/A    HEPATITIS C GENOTYPE N/A    HEPATITIS B SURFACE ANTIGEN Internal 4/29/2020   HEPATITIS B SURFACE ANTIBODY Internal 4/24/2020   HEPATITIS B DNA QUANT LEVEL N/A    HEPATITIS B CORE ANTIBODY Internal 4/24/2020

## 2020-05-20 ENCOUNTER — TELEPHONE (OUTPATIENT)
Dept: SURGERY | Facility: CLINIC | Age: 50
End: 2020-05-20

## 2020-05-20 DIAGNOSIS — Z11.59 ENCOUNTER FOR SCREENING FOR OTHER VIRAL DISEASES: Primary | ICD-10-CM

## 2020-05-20 PROBLEM — K80.20 GALLSTONES: Status: ACTIVE | Noted: 2020-05-20

## 2020-05-20 NOTE — TELEPHONE ENCOUNTER
----- Message from Steffi Cespedes RN sent at 5/19/2020 10:08 AM CDT -----  Regarding: FW: Referral  Watch for CR.  Schedule in next in June (robot fletcher with liver bx). Please call him in the afternoon.  ----- Message -----  From: Merrick Moncada MD  Sent: 5/19/2020   9:59 AM CDT  To: Steffi Cespedes RN, #  Subject: RE: Referral                                     Thank you for reviewing this patient.  With your ok, I am going to go ahead and schedule him for lap fletcher and add the liver Bx.  Thanks for addressing this so quickly.  JG  ----- Message -----  From: Logan Gilmore MD  Sent: 5/19/2020   8:37 AM CDT  To: Thomas Michael Leventhal, MD, #  Subject: RE: Referral                                     Awesome. Thanks Mark.  ----- Message -----  From: Leventhal, Thomas Michael, MD  Sent: 5/18/2020   3:52 PM CDT  To: Merrick Moncada MD, Logan Gilmore MD  Subject: FW: Referral                                     Greetings all,      I will certainly see this patient in clinic if you would like, I have reviewed all of his information and here is my assessment  - His acute elevation in liver tests was not related to fatty liver disease - this isn't the natural history of SALAS/NAFLD.  His ALT in the 70s now could be consistent with hepatic inflammation from SALAS  - The significant GGT elevation with time of symptoms then normalization now strongly suggests biliary disease  - The findings of changed flow in the hepatic veins is non specific and may be related to fatty liver disease or right heart disease.  - He has normal platelets and normal INR, which suggests against severe portal hypertension and against impaired synthetic function.  - Noted that his spleen is enlarged, however, objective measures of portal hypertension and impaired synthetic function are normal (INR, platelets) so this does not serve as a contra-indication to surgical intervention.      My recommendation will be a cholecystectomy with  liver biopsy performed during the procedure.       I have reached out to my clinic nurse to schedule him  ----- Message -----  From: Logan Gilmore MD  Sent: 5/18/2020   2:42 PM CDT  To: Thomas Michael Leventhal, MD, Danette Beaver RN  Subject: RE: Referral                                     Ok, let's refer him to hepatology. Preferably Mark if he has time since I messaged him about this patient before.    ----- Message -----  From: Danette Beaver RN  Sent: 5/18/2020   2:10 PM CDT  To: Logan Gilmore MD  Subject: FW: Referral                                     See message below  Thanks  MK  ----- Message -----  From: Steffi Cespedes RN  Sent: 5/18/2020   2:02 PM CDT  To: Danette Beaver RN  Subject: Referral                                         Dr. Mando Taylor referred this patient to General Surgery, Dr. Moncada, for consideration of cholecystectomy.  Dr. Moncada reviewed the patients current labs and does not recommend surgery at this time.  He agreed with Dr. Gilmore's recommendation of consulting with GI then proceeding with surgery if cleared by Hepatology.    Dr. Moncada would like to defer the referral to your team.    4/29- Recheck LFTs in 2 weeks. If ALT still mildly elevated, referral to hepatology as he may have a background of SALAS    Thanks,  Steffi Cespedes RN, BSN, CNOR, RNFA, CBCN  [facility]

## 2020-05-20 NOTE — TELEPHONE ENCOUNTER
FUTURE VISIT INFORMATION      SURGERY INFORMATION:    Date: 20    Location: [facility]    Surgeon:  Merrick Moncada MD    Anesthesia Type:  general    Procedure: CHOLECYSTECTOMY, ROBOT-ASSISTED, LAPAROSCOPIC     RECORDS REQUESTED FROM:       Primary Care Provider: Tc Greenwood MD- [place]    Most recent EKG+ Tracin20

## 2020-05-20 NOTE — TELEPHONE ENCOUNTER
Patient is scheduled for surgery with Dr. Merrick Moncada      Spoke with: Iglesia Wolff about surgery dates.    Date of Surgery: 06/05/2020 at 9:40 AM with Dr. Merrick Moncada    Location:     [address] [address]      [phone]      [email or web address]    Pre-op with surgeon (if applicable): Not required    H&P: Scheduled with [facility] ([facility]) 05/21/2020 at 9:00 AM.     Informed patient they will need an adult : Yes  Name of : spouse    Post op: Protocol     Special Equipment: Robot, IC green.    Additional imaging/appointments: Not required    Surgery packet: Not required    Additional comments: He also knows to call general surgery if he experiences any cold or flu symptoms. Surgery teaching and soap mailed. He was asked to call [phone] if he needs to reschedule and [phone] if he experiences any symptoms.

## 2020-05-21 ENCOUNTER — PRE VISIT (OUTPATIENT)
Dept: SURGERY | Facility: CLINIC | Age: 50
End: 2020-05-21

## 2020-05-21 ENCOUNTER — OFFICE VISIT (OUTPATIENT)
Dept: SURGERY | Facility: CLINIC | Age: 50
End: 2020-05-21
Payer: COMMERCIAL

## 2020-05-21 ENCOUNTER — ANESTHESIA EVENT (OUTPATIENT)
Dept: SURGERY | Facility: CLINIC | Age: 50
End: 2020-05-21

## 2020-05-21 VITALS
OXYGEN SATURATION: 98 % | RESPIRATION RATE: 16 BRPM | HEART RATE: 81 BPM | WEIGHT: 292 LBS | BODY MASS INDEX: 41.8 KG/M2 | HEIGHT: 70 IN | SYSTOLIC BLOOD PRESSURE: 116 MMHG | TEMPERATURE: 98.8 F | DIASTOLIC BLOOD PRESSURE: 77 MMHG

## 2020-05-21 DIAGNOSIS — Z01.818 PREOP EXAMINATION: Primary | ICD-10-CM

## 2020-05-21 ASSESSMENT — PAIN SCALES - GENERAL: PAINLEVEL: NO PAIN (0)

## 2020-05-21 ASSESSMENT — MIFFLIN-ST. JEOR: SCORE: 2185.75

## 2020-05-21 ASSESSMENT — LIFESTYLE VARIABLES: TOBACCO_USE: 0

## 2020-05-21 ASSESSMENT — ENCOUNTER SYMPTOMS: SEIZURES: 0

## 2020-05-21 NOTE — H&P
"  Pre-Operative H & P     CC:  Preoperative exam to assess for increased cardiopulmonary risk while undergoing surgery and anesthesia.    Date of Encounter: 5/21/2020  Primary Care Physician:  Tc Greenwood  Reason for Visit: Gallstones     HPI  Iglesia Wolff is a 49 y/o male who presents for pre-operative H&P in preparation for CHOLECYSTECTOMY, ROBOT-ASSISTED, LAPAROSCOPIC with Merrick Moncada MD on 6/5/20 at [facility] for treatment of Gallstones.     Mr. Wolff began to experience bad indigestion/abdominal discomfort in early April with associated loose stools and nausea. This coincided with a flare up of his chronic back pain. He was put on prednisone 40 mg for his back from 4/9-4/13 but stopped when he noticed his urine had become dark and his stool was tan colored. Labs on 4/14 showed elevated LFTs (TB 3.5, ). However, his symptoms completely resolved including his urine and stool color a couple days later. U/S on 4/17 was difficult due to body habitus but no intrahepatic biliary dilation and steatosis was seen, associated splenomegaly, and cholelithiasis. Subsequent MRCP on 4/22 showed normal biliary tree with a 3 mm CBD. Cholelithiasis and steatosis were redemonstrated. He now presents for the above procedure.     PMH is also significant for HTN, lumbar radiculopathy, knee OA.     History was obtained from patient & chart review.      Past Medical History  Past Medical History:   Diagnosis Date     Gallstones      Hepatic steatosis      HTN (hypertension)      Knee pain      Lumbar radiculopathy      Migraine     Rare, \"stress-related\"       Past Surgical History  Past Surgical History:   Procedure Laterality Date     DENTAL SURGERY  1987    wisdom teeth     HERNIA REPAIR, INGUINAL RT/LT Bilateral 1979       Hx of Blood transfusions/reactions: no     Hx of abnormal bleeding or anti-platelet use: no    Menstrual history: No LMP for male patient.: " N/A    Steroid use in the last year: yes, in March x5 days for LBP    Personal or FH with difficulty with Anesthesia:  no    Prior to Admission Medications  Current Outpatient Medications   Medication Sig Dispense Refill     hydrochlorothiazide (HYDRODIURIL) 12.5 MG tablet Take 1 tablet by mouth in the AM and 1/2 tablet in the  tablet 0     losartan (COZAAR) 50 MG tablet Take 1 tablet by mouth in the AM and 1/2 tablet in the  tablet 0     VITAMIN D PO Take 1,000 Units by mouth every morning         Allergies  No Known Allergies    Social History  Social History     Socioeconomic History     Marital status:      Spouse name: Not on file     Number of children: 0     Years of education: Not on file     Highest education level: Not on file   Occupational History     Employer: [employer]   Social Needs     Financial resource strain: Not on file     Food insecurity     Worry: Not on file     Inability: Not on file     Transportation needs     Medical: Not on file     Non-medical: Not on file   Tobacco Use     Smoking status: Never Smoker     Smokeless tobacco: Never Used   Substance and Sexual Activity     Alcohol use: Yes     Comment: Rare, social use     Drug use: Not on file     Sexual activity: Yes   Lifestyle     Physical activity     Days per week: Not on file     Minutes per session: Not on file     Stress: Not on file   Relationships     Social connections     Talks on phone: Not on file     Gets together: Not on file     Attends [employer] service: Not on file     Active member of club or organization: Not on file     Attends meetings of clubs or organizations: Not on file     Relationship status: Not on file     Intimate partner violence     Fear of current or ex partner: Not on file     Emotionally abused: Not on file     Physically abused: Not on file     Forced sexual activity: Not on file   Other Topics Concern     Not on file   Social History Narrative     Not on file  "      Family History  Family History   Problem Relation Age of Onset     Family History Negative Father      Family History Negative Mother      Coronary Stenting Brother      Anesthesia Reaction No family hx of      Deep Vein Thrombosis (DVT) No family hx of        Preop Vitals    BP Readings from Last 3 Encounters:   05/21/20 116/77   04/14/20 (!) 161/106   12/16/09 122/78    Pulse Readings from Last 3 Encounters:   05/21/20 81   04/14/20 83   12/16/09 76      Resp Readings from Last 3 Encounters:   05/21/20 16    SpO2 Readings from Last 3 Encounters:   05/21/20 98%   04/14/20 94%      Temp Readings from Last 1 Encounters:   05/21/20 98.8  F (37.1  C) (Oral)    Ht Readings from Last 1 Encounters:   05/21/20 1.778 m (5' 10\")      Wt Readings from Last 1 Encounters:   05/21/20 132.5 kg (292 lb)    Estimated body mass index is 41.9 kg/m  as calculated from the following:    Height as of this encounter: 1.778 m (5' 10\").    Weight as of this encounter: 132.5 kg (292 lb).       ROS/MED HX  The complete review of systems is negative other than noted in the HPI or here.  Patient denies recent illness, fever and respiratory infection during past month.    ENT/Pulmonary:     (+)JOAQUIN risk factors snores loudly, obese, , . .   (-) tobacco use, asthma and sleep apnea   Neurologic:  - neg neurologic ROS    (-) seizures, CVA and Neuropathy   Cardiovascular:     (+) hypertension----. : . . . :. . Previous cardiac testing date:results:date: results:ECG reviewed date:4/14/20 results:normal date: results:          METS/Exercise Tolerance: Comment: Uses recombinant bike x25 min per day. Denies CP & SOB. Activity somewhat limited due to knee pain. Has recently lost 15 lbs intentionally. 4 - Raking leaves, gardening   Hematologic:  - neg hematologic  ROS      (-) history of blood clots and History of Transfusion   Musculoskeletal: Comment: Knees, has had injections    Occasional LBP, has had prednisone for \"disc\" pain  (+) arthritis,  " "-       GI/Hepatic: Comment: Mildly elevated LFTs w/ gallstones - neg GI/hepatic ROS   (+) cholecystitis/cholelithiasis,      (-) GERD   Renal/Genitourinary:  - ROS Renal section negative       Endo: Comment: Had prednisone in March x5 days for LBP    (+) Obesity, .   (-) Type II DM   Psychiatric:  - neg psychiatric ROS       Infectious Disease:  - neg infectious disease ROS       Malignancy:      - no malignancy   Other:    (+) no H/O Chronic Pain,             PHYSICAL EXAM:   Mental Status/Neuro: A/A/O; Age Appropriate   Airway: Facies: Thick Neck (Beard)  Mallampati: III  Mouth/Opening: Full  TM distance: > 6 cm  Neck ROM: Full   Respiratory: Auscultation: CTAB     Resp. Rate: Normal     Resp. Effort: Normal      CV: Rhythm: Regular  Rate: Age appropriate  Heart: Normal Sounds  Edema: None   Comments:      Dental: Dentures  Dentures: Lower; Partial              Temp: 98.8  F (37.1  C) Temp src: Oral BP: 116/77 Pulse: 81   Resp: 16 SpO2: 98 %         292 lbs 0 oz  5' 10\"   Body mass index is 41.9 kg/m .    Physical Exam  Constitutional: Awake, alert, cooperative, no apparent distress, and appears stated age.  Eyes: Pupils equal, round and reactive to light, extra ocular muscles intact, sclera clear, conjunctiva normal.  HENT: Normocephalic, oral pharynx with moist mucus membranes, good dentition. No goiter appreciated. Has removable denture for 2 front lower teeth.  Respiratory: Clear to auscultation bilaterally, no crackles or wheezing. No SOB when supine.  Cardiovascular: Regular rate and rhythm, normal S1 and S2, and no murmur noted.  Carotids +2, no bruits. No edema. Palpable pulses to radial, DP and PT arteries.   GI: Normal bowel sounds, soft, obese, non-tender, no masses palpated, exam limited due to body habitus    Lymph/Hematologic: No cervical lymphadenopathy and no supraclavicular lymphadenopathy.  Genitourinary:  deferred  Skin: Warm and dry.  No rashes.   Musculoskeletal: full ROM of neck. There is no " redness, warmth, or swelling of the joints. Gross motor strength is normal.    Neurologic: Awake, alert, oriented to name, place and time. Cranial nerves II-XII are grossly intact. Gait is normal. Ambulates from chair to exam table, seats self, lies supine and sits back up w/o assistance.  Neuropsychiatric: Calm, cooperative. Normal affect. Pleasant. Answers questions appropriately, follows commands w/o difficulty.    PRIOR LABS/DIAGNOSTIC STUDIES:  All labs and imaging personally reviewed     MR ABDOMEN MRCP WITH AND WITHOUT CONTRAST April 22, 2020  IMPRESSION:  1.  No bile duct dilation.  2.  Cholelithiasis. No evidence of choledocholithiasis or cholecystitis.  3.  Hepatic steatosis.     US ABDOMEN COMPLETE WITH DOPPLER COMPLETE 4/17/2020  IMPRESSION:  1.  Hepatic parenchymal disease. Consider steatosis.  2.  There is splenomegaly.  3.  There is portalization of flow in the hepatic veins rasta which  could be seen with hepatic parenchymal disease or cardiac disease.  There is normal flow direction in the portal venous system. The  hepatic artery is not seen due to body habitus.  4.  Cholelithiasis is suspected. There is diffuse tenderness without a  sonographic More's sign.    LABS:  CBC:   Lab Results   Component Value Date    WBC 7.9 05/14/2020    WBC 9.1 12/16/2009    HGB 15.9 05/14/2020    HGB 17.5 12/16/2009    HCT 45.8 05/14/2020    HCT 51.1 12/16/2009     05/14/2020     12/16/2009     BMP:   Lab Results   Component Value Date     05/14/2020     04/14/2020    POTASSIUM 4.0 05/14/2020    POTASSIUM 3.5 04/14/2020    CHLORIDE 105 05/14/2020    CHLORIDE 100 04/14/2020    CO2 29 05/14/2020    CO2 24 04/14/2020    BUN 15 05/14/2020    BUN 19 04/14/2020    CR 0.93 05/14/2020    CR 0.97 04/14/2020     (H) 05/14/2020     (H) 04/14/2020     COAGS:   Lab Results   Component Value Date    INR 1.01 04/24/2020     POC: No results found for: BGM, HCG, HCGS  OTHER:   Lab Results    Component Value Date    A1C 5.9 (H) 04/14/2020    EMMA 9.2 05/14/2020    ALBUMIN 3.6 05/14/2020    PROTTOTAL 7.7 05/14/2020    ALT 72 (H) 05/14/2020    AST 37 05/14/2020    GGT 84 (H) 05/14/2020    ALKPHOS 88 05/14/2020    BILITOTAL 1.5 (H) 05/14/2020    LIPASE 111 04/14/2020    TSH 2.25 12/16/2009    CRP 9.7 (H) 04/14/2020    SED 8 04/14/2020        Labs today: not indicated      ASSESSMENT and PLAN  Iglesia Wolff is a 50 year old male scheduled to undergo CHOLECYSTECTOMY, ROBOT-ASSISTED, LAPAROSCOPIC with Merrick Moncada MD on 6/5/20 at [facility] for treatment of Gallstones.    Pt has had prior anesthetic. Type: General    No history of anesthetic complications    He has the following specific operative considerations:   # JOAQUIN 6/8 = high risk  # VTE risk: 0.5%  # Risk of PONV score = 2.  If > 2, anti-emetic intervention recommended.  # Anesthesia considerations:  Refer to PAC assessment in anesthesia records      CARDIAC: METS 4,  Uses recombinant bike x25 min per day. Denies CP & SOB. Activity somewhat limited due to knee pain. Has recently lost 15 lbs intentionally. Able to do yard work.     # RCRI : No serious cardiac risks.  0.4% risk of major adverse cardiac event.     #  EKG 4/14/20: normal     #  HTN, will hold hydrochlorothiazide & losartan on DOS    PULMONARY:     # Never smoked    # No asthma or inhaler use    GI: denies GERD      ENDO: BMI 42    # Obesity: Recommend careful positioning to prevent airway/ventilatory compromise, or tissue injury.     # No DM    ORTHO: Full ROM of neck, no TMJ    # B/L knee osteoarthritis    Patient is optimized and is acceptable candidate for the proposed procedure. No further diagnostic evaluation is needed.    Arrival time, NPO, shower and medication instructions provided by nursing staff today.  Preparing For Your Surgery handout given.    Raiza Culver PA-C  Preoperative Assessment Center  [facility]  [place]  [facility]  Phone: [phone]  Fax: [phone]

## 2020-05-21 NOTE — ANESTHESIA PREPROCEDURE EVALUATION
"Anesthesia Pre-Procedure Evaluation    Patient: Iglesia Wolff   MRN:     [id number] Gender:   male   Age:    50 year old :      1970        Preoperative Diagnosis: * No surgery found *        LABS:  CBC:   Lab Results   Component Value Date    WBC 7.9 2020    WBC 9.1 2009    HGB 15.9 2020    HGB 17.5 2009    HCT 45.8 2020    HCT 51.1 2009     2020     2009     BMP:   Lab Results   Component Value Date     2020     2020    POTASSIUM 4.0 2020    POTASSIUM 3.5 2020    CHLORIDE 105 2020    CHLORIDE 100 2020    CO2 29 2020    CO2 24 2020    BUN 15 2020    BUN 19 2020    CR 0.93 2020    CR 0.97 2020     (H) 2020     (H) 2020     COAGS:   Lab Results   Component Value Date    INR 1.01 2020     POC: No results found for: BGM, HCG, HCGS  OTHER:   Lab Results   Component Value Date    A1C 5.9 (H) 2020    EMMA 9.2 2020    ALBUMIN 3.6 2020    PROTTOTAL 7.7 2020    ALT 72 (H) 2020    AST 37 2020    GGT 84 (H) 2020    ALKPHOS 88 2020    BILITOTAL 1.5 (H) 2020    LIPASE 111 2020    TSH 2.25 2009    CRP 9.7 (H) 2020    SED 8 2020        Preop Vitals    BP Readings from Last 3 Encounters:   20 116/77   20 (!) 161/106   09 122/78    Pulse Readings from Last 3 Encounters:   20 81   20 83   09 76      Resp Readings from Last 3 Encounters:   20 16    SpO2 Readings from Last 3 Encounters:   20 98%   20 94%      Temp Readings from Last 1 Encounters:   20 98.8  F (37.1  C) (Oral)    Ht Readings from Last 1 Encounters:   20 1.778 m (5' 10\")      Wt Readings from Last 1 Encounters:   20 132.5 kg (292 lb)    Estimated body mass index is 41.9 kg/m  as calculated from the following:    Height as of this encounter: " "1.778 m (5' 10\").    Weight as of this encounter: 132.5 kg (292 lb).     LDA:        Past Medical History:   Diagnosis Date     Gallstones      Hepatic steatosis      HTN (hypertension)      Knee pain      Lumbar radiculopathy      Migraine     Rare, \"stress-related\"      Past Surgical History:   Procedure Laterality Date     DENTAL SURGERY  1987    wisdom teeth     HERNIA REPAIR, INGUINAL RT/LT Bilateral 1979      No Known Allergies     Anesthesia Evaluation     . Pt has had prior anesthetic. Type: General    No history of anesthetic complications          ROS/MED HX    ENT/Pulmonary:     (+)JOAQUIN risk factors snores loudly, obese, , . .   (-) tobacco use, asthma and sleep apnea   Neurologic:  - neg neurologic ROS    (-) seizures, CVA and Neuropathy   Cardiovascular:     (+) hypertension----. : . . . :. . Previous cardiac testing date:results:date: results:ECG reviewed date:4/14/20 results:normal date: results:          METS/Exercise Tolerance: Comment: Uses recombinant bike x25 min per day. Denies CP & SOB. Activity somewhat limited due to knee pain. Has recently lost 15 lbs intentionally. 4 - Raking leaves, gardening   Hematologic:  - neg hematologic  ROS      (-) history of blood clots and History of Transfusion   Musculoskeletal: Comment: Knees, has had injections    Occasional LBP, has had prednisone for \"disc\" pain  (+) arthritis,  -       GI/Hepatic: Comment: Mildly elevated LFTs w/ gallstones - neg GI/hepatic ROS   (+) cholecystitis/cholelithiasis,      (-) GERD   Renal/Genitourinary:  - ROS Renal section negative       Endo: Comment: Had prednisone in March x5 days for LBP    (+) Obesity, .   (-) Type II DM   Psychiatric:  - neg psychiatric ROS       Infectious Disease:  - neg infectious disease ROS       Malignancy:      - no malignancy   Other:    (+) no H/O Chronic Pain,                       PHYSICAL EXAM:   Mental Status/Neuro: A/A/O; Age Appropriate   Airway: Facies: Thick Neck (Beard)  Mallampati: " III  Mouth/Opening: Full  TM distance: > 6 cm  Neck ROM: Full   Respiratory: Auscultation: CTAB     Resp. Rate: Normal     Resp. Effort: Normal      CV: Rhythm: Regular  Rate: Age appropriate  Heart: Normal Sounds  Edema: None   Comments:      Dental: Dentures  Dentures: Lower; Partial                JZG FV AN PLAN NO PONV RULE       PAC Discussion and Assessment    ASA Classification: 3  Case is suitable for: [place]  Anesthetic techniques and relevant risks discussed: GA  Invasive monitoring and risk discussed: No  Types:   Possibility and Risk of blood transfusion discussed: No  NPO instructions given:   Additional anesthetic preparation and risks discussed:   Needs early admission to pre-op area:   Other:     PAC Resident/NP Anesthesia Assessment:  Iglesia Wolff is a 50 year old male scheduled to undergo CHOLECYSTECTOMY, ROBOT-ASSISTED, LAPAROSCOPIC with Merrick Moncada MD on 6/5/20 at [facility] for treatment of Gallstones.    Pt has had prior anesthetic. Type: General    No history of anesthetic complications    He has the following specific operative considerations:   # JOAQUIN 6/8 = high risk  # VTE risk: 0.5%  # Risk of PONV score = 2.  If > 2, anti-emetic intervention recommended.  # Anesthesia considerations:  Refer to PAC assessment in anesthesia records      CARDIAC: METS 4,  Uses recombinant bike x25 min per day. Denies CP & SOB. Activity somewhat limited due to knee pain. Has recently lost 15 lbs intentionally. Able to do yard work.     # RCRI : No serious cardiac risks.  0.4% risk of major adverse cardiac event.     #  EKG 4/14/20: normal     #  HTN, will hold hydrochlorothiazide & losartan on DOS    PULMONARY:     # Never smoked    # No asthma or inhaler use    GI: denies GERD      ENDO: BMI 42    # Obesity: Recommend careful positioning to prevent airway/ventilatory compromise, or tissue injury.     # No DM    ORTHO: Full ROM of neck, no TMJ    # B/L knee  osteoarthritis    Patient is optimized and is acceptable candidate for the proposed procedure. No further diagnostic evaluation is needed.        Reviewed and Signed by PAC Mid-Level Provider/Resident  Mid-Level Provider/Resident: Raiza Culver PA-C  Date: 5/21/20  Time: 0941    Attending Anesthesiologist Anesthesia Assessment:        Anesthesiologist:   Date:   Time:   Pass/Fail:   Disposition:     PAC Pharmacist Assessment:        Pharmacist:   Date:   Time:    Raiza Culver PA-C

## 2020-05-21 NOTE — PATIENT INSTRUCTIONS
Preparing for Your Surgery      Name:  Iglesia Wolff   MRN:  [id number]   :  1970   Today's Date:  2020     Arriving for surgery:  Surgery date:  20  Arrival time:  7:40 am    Due to the COVID 19 crisis, we are trying to keep our patients safe from others who might have respiratory illnesses so the hospital is implementing a no visitor policy.  Also, at this time  parking is not available.    Please come to:     [facility]   [address] [place]  [address]    -    Please proceed to the Surgery Lounge on the 3rd floor. [phone]?     - ?If you are in need of directions, wheelchair or escort please stop at the Information Desk in the lobby.  Inform the information person that you are here for surgery; a wheelchair and escort will be provided to the Surgery Lounge .?     What can I eat or drink?  -  You may have solid food or milk products until 8 hours prior to your surgery. (Until 1:40 am)  -  You may have water, apple juice or 7up/Sprite until 2 hours prior to your surgery. (Until 7:40 am)    Which medicines can I take?  -    Hold Aspirin, Aspirin products, Multivitamins and supplements one week prior to surgery.        -  Follow Surgery Clinic instructions for Ibuprofen. If you weren't given any instructions, hold Ibuprofen for 24 hours prior to surgery.        -  Hold Naproxen/Aleve for 4 days prior to surgery.     -  Do NOT take these medications in the morning, the day of surgery:  Losartan, Hydrochlorothiazide, Vitamin D    -  Please take these medications the day of surgery:  Acetaminophen (Tylenol) if needed    How do I prepare myself?  -  Take two showers: one the night before surgery; and one the morning of surgery.         Use Scrubcare or Hibiclens to wash from neck down, leave soap on your skin for up to one minute.  Do not get soap in your eyes or ears.  You may use your own shampoo and conditioner; no other hair products.    -  Do NOT use lotion, powder, deodorant, or antiperspirant the day of your surgery.  -  Do NOT wear any jewelry.  - Do not bring your own medications to the hospital.  -  Bring your ID and insurance card.    -If you are scheduled to go home the Same Day as surgery you must have a responsible adult as a  and to stay with you overnight the first 24 hours after surgery.     Questions or Concerns:  -If you are scheduled on the [facility] or [facility] campus and have questions or concerns regarding the day of surgery, please call Preadmission Nursing at [phone].     -If you have health changes between today and your surgery please call your surgeon. For questions after surgery please call your surgeons office.     AFTER YOUR SURGERY  Breathing exercises   Breathing exercises help you recover faster. Take deep breaths and let the air out slowly. This will:     Help you wake up after surgery.    Help prevent complications like pneumonia.  Preventing complications will help you go home sooner.   We may give you a breathing device (incentive spirometer) to encourage you to breathe deeply.   Nausea and vomiting   You may feel sick to your stomach after surgery; if so, let your nurse know.    Pain control:  After surgery, you may have pain. Our goal is to help you manage your pain. Pain medicine will help you feel comfortable enough to do activities that will help you heal.  These activities may include breathing exercises, walking and physical therapy.   To help your health care team treat your pain we will ask: 1) If you have pain  2) where it is located 3) describe your pain in your words  Methods of pain control include medications given by mouth, vein or by nerve block for some surgeries.  Sequential Compression Device (SCD):  You may need to wear SCD S (also called pneumo boots)on your legs or feet. These are wraps connected to a machine that pumps in air and releases it. The repeated pumping helps prevent blood clots  from forming.

## 2020-06-02 ENCOUNTER — PATIENT OUTREACH (OUTPATIENT)
Dept: SURGERY | Facility: CLINIC | Age: 50
End: 2020-06-02

## 2020-06-02 ENCOUNTER — TELEPHONE (OUTPATIENT)
Dept: SURGERY | Facility: CLINIC | Age: 50
End: 2020-06-02

## 2020-06-02 DIAGNOSIS — Z11.59 ENCOUNTER FOR SCREENING FOR OTHER VIRAL DISEASES: ICD-10-CM

## 2020-06-02 LAB
SARS-COV-2 RNA SPEC QL NAA+PROBE: NOT DETECTED
SPECIMEN SOURCE: NORMAL

## 2020-06-02 PROCEDURE — 99207 ZZC NO BILLABLE SERVICE THIS VISIT: CPT

## 2020-06-02 PROCEDURE — 87635 SARS-COV-2 COVID-19 AMP PRB: CPT | Performed by: SURGERY

## 2020-06-02 PROCEDURE — 99000 SPECIMEN HANDLING OFFICE-LAB: CPT | Performed by: SURGERY

## 2020-06-02 NOTE — TELEPHONE ENCOUNTER
"[facility] Call Center    Phone Message    May a detailed message be left on voicemail: yes     Reason for Call: pt reporting that he wants Dr. Moncada's team to call him first before the team would call the insurance company. Insurance company told pt that this needs to be marked \"URGENT\" for Dr. Moncada's team to be calling the insurance company back since the procedure is this Friday, 6/05. But please call the pt, first, asap. [facility]# [phone] that is the provider # for [facility].  Thank you.      Action Taken: Message routed to:  [facility] ([facility]):  Surgery Clinic    Travel Screening: Not Applicable                                                                      "

## 2020-06-02 NOTE — PROGRESS NOTES
"Contacted patient and he states his insurance company is requesting a call from the doctors office. He wanted to make sure his case was marked as \"urgent\". Called and spoke with a representative and was informed that a prior authorization is in progress. He is going to susan the case as urgent so there is a decision within 72 hours, as patient is scheduled for surgery 6/5. Provided GS number to call back with any further issues. RN will contact insurance company tomorrow to make sure claim is being processed. [facility]# [phone]      Sharon Marie Andrea, Ada Dobson called and left a message about needing someone to contact his insurance. He didn't mention CPT codes needed. It sounded like it is for clinical necessity of surgery. Would you be able to call him to discuss?     ThanksSharon        "

## 2020-06-03 ENCOUNTER — PATIENT OUTREACH (OUTPATIENT)
Dept: SURGERY | Facility: CLINIC | Age: 50
End: 2020-06-03

## 2020-06-03 NOTE — PROGRESS NOTES
Contacted [facility] regarding patients upcoming surgery and was informed that patient has been approved for his upcoming surgery on 6/5. Was informed that patient would be notified of this.

## 2020-06-04 ENCOUNTER — ANESTHESIA EVENT (OUTPATIENT)
Dept: SURGERY | Facility: CLINIC | Age: 50
End: 2020-06-04
Payer: COMMERCIAL

## 2020-06-05 ENCOUNTER — HOSPITAL ENCOUNTER (OUTPATIENT)
Facility: CLINIC | Age: 50
Discharge: HOME | End: 2020-06-05
Attending: SURGERY | Admitting: SURGERY
Payer: COMMERCIAL

## 2020-06-05 ENCOUNTER — ANESTHESIA (OUTPATIENT)
Dept: SURGERY | Facility: CLINIC | Age: 50
End: 2020-06-05
Payer: COMMERCIAL

## 2020-06-05 ENCOUNTER — ANCILLARY PROCEDURE (OUTPATIENT)
Dept: ULTRASOUND IMAGING | Facility: CLINIC | Age: 50
End: 2020-06-05
Payer: COMMERCIAL

## 2020-06-05 VITALS
HEIGHT: 70 IN | DIASTOLIC BLOOD PRESSURE: 82 MMHG | BODY MASS INDEX: 40.27 KG/M2 | TEMPERATURE: 98.8 F | SYSTOLIC BLOOD PRESSURE: 136 MMHG | HEART RATE: 79 BPM | RESPIRATION RATE: 14 BRPM | OXYGEN SATURATION: 97 % | WEIGHT: 281.31 LBS

## 2020-06-05 DIAGNOSIS — K80.20 GALLSTONES: ICD-10-CM

## 2020-06-05 LAB
CREAT SERPL-MCNC: 1.08 MG/DL (ref 0.66–1.25)
GFR SERPL CREATININE-BSD FRML MDRD: 79 ML/MIN/{1.73_M2}
GLUCOSE BLDC GLUCOMTR-MCNC: 109 MG/DL (ref 70–99)
HGB BLD-MCNC: 14.9 G/DL (ref 13.3–17.7)
POTASSIUM SERPL-SCNC: 3.1 MMOL/L (ref 3.4–5.3)

## 2020-06-05 PROCEDURE — 25000128 H RX IP 250 OP 636: Performed by: STUDENT IN AN ORGANIZED HEALTH CARE EDUCATION/TRAINING PROGRAM

## 2020-06-05 PROCEDURE — 40000170 ZZH STATISTIC PRE-PROCEDURE ASSESSMENT II: Performed by: SURGERY

## 2020-06-05 PROCEDURE — 88307 TISSUE EXAM BY PATHOLOGIST: CPT | Performed by: SURGERY

## 2020-06-05 PROCEDURE — 25000128 H RX IP 250 OP 636: Performed by: NURSE ANESTHETIST, CERTIFIED REGISTERED

## 2020-06-05 PROCEDURE — 25000566 ZZH SEVOFLURANE, EA 15 MIN: Performed by: SURGERY

## 2020-06-05 PROCEDURE — 36000086 ZZH SURGERY LEVEL 8 1ST 30 MIN UMMC: Performed by: SURGERY

## 2020-06-05 PROCEDURE — 25000128 H RX IP 250 OP 636: Performed by: ANESTHESIOLOGY

## 2020-06-05 PROCEDURE — 88304 TISSUE EXAM BY PATHOLOGIST: CPT | Performed by: SURGERY

## 2020-06-05 PROCEDURE — 25000125 ZZHC RX 250: Performed by: NURSE ANESTHETIST, CERTIFIED REGISTERED

## 2020-06-05 PROCEDURE — 37000008 ZZH ANESTHESIA TECHNICAL FEE, 1ST 30 MIN: Performed by: SURGERY

## 2020-06-05 PROCEDURE — 71000027 ZZH RECOVERY PHASE 2 EACH 15 MINS: Performed by: SURGERY

## 2020-06-05 PROCEDURE — 37000009 ZZH ANESTHESIA TECHNICAL FEE, EACH ADDTL 15 MIN: Performed by: SURGERY

## 2020-06-05 PROCEDURE — 25000125 ZZHC RX 250: Performed by: SURGERY

## 2020-06-05 PROCEDURE — 82565 ASSAY OF CREATININE: CPT | Performed by: ANESTHESIOLOGY

## 2020-06-05 PROCEDURE — 25000128 H RX IP 250 OP 636: Performed by: SURGERY

## 2020-06-05 PROCEDURE — 82962 GLUCOSE BLOOD TEST: CPT

## 2020-06-05 PROCEDURE — 25000132 ZZH RX MED GY IP 250 OP 250 PS 637: Performed by: ANESTHESIOLOGY

## 2020-06-05 PROCEDURE — 88313 SPECIAL STAINS GROUP 2: CPT | Performed by: SURGERY

## 2020-06-05 PROCEDURE — 84132 ASSAY OF SERUM POTASSIUM: CPT | Performed by: ANESTHESIOLOGY

## 2020-06-05 PROCEDURE — 25800030 ZZH RX IP 258 OP 636: Performed by: NURSE ANESTHETIST, CERTIFIED REGISTERED

## 2020-06-05 PROCEDURE — 71000014 ZZH RECOVERY PHASE 1 LEVEL 2 FIRST HR: Performed by: SURGERY

## 2020-06-05 PROCEDURE — 85018 HEMOGLOBIN: CPT | Performed by: ANESTHESIOLOGY

## 2020-06-05 PROCEDURE — 27210794 ZZH OR GENERAL SUPPLY STERILE: Performed by: SURGERY

## 2020-06-05 PROCEDURE — 36000088 ZZH SURGERY LEVEL 8 EA 15 ADDTL MIN - UMMC: Performed by: SURGERY

## 2020-06-05 PROCEDURE — 36415 COLL VENOUS BLD VENIPUNCTURE: CPT | Performed by: ANESTHESIOLOGY

## 2020-06-05 PROCEDURE — 25800030 ZZH RX IP 258 OP 636: Performed by: ANESTHESIOLOGY

## 2020-06-05 RX ORDER — FENTANYL CITRATE 50 UG/ML
25-50 INJECTION, SOLUTION INTRAMUSCULAR; INTRAVENOUS
Status: DISCONTINUED | OUTPATIENT
Start: 2020-06-05 | End: 2020-06-05 | Stop reason: HOSPADM

## 2020-06-05 RX ORDER — ONDANSETRON 2 MG/ML
INJECTION INTRAMUSCULAR; INTRAVENOUS PRN
Status: DISCONTINUED | OUTPATIENT
Start: 2020-06-05 | End: 2020-06-05

## 2020-06-05 RX ORDER — ONDANSETRON 4 MG/1
4 TABLET, ORALLY DISINTEGRATING ORAL EVERY 30 MIN PRN
Status: DISCONTINUED | OUTPATIENT
Start: 2020-06-05 | End: 2020-06-05 | Stop reason: HOSPADM

## 2020-06-05 RX ORDER — LIDOCAINE 40 MG/G
CREAM TOPICAL
Status: DISCONTINUED | OUTPATIENT
Start: 2020-06-05 | End: 2020-06-05 | Stop reason: HOSPADM

## 2020-06-05 RX ORDER — NALOXONE HYDROCHLORIDE 0.4 MG/ML
.1-.4 INJECTION, SOLUTION INTRAMUSCULAR; INTRAVENOUS; SUBCUTANEOUS
Status: DISCONTINUED | OUTPATIENT
Start: 2020-06-05 | End: 2020-06-05 | Stop reason: HOSPADM

## 2020-06-05 RX ORDER — PROPOFOL 10 MG/ML
INJECTION, EMULSION INTRAVENOUS PRN
Status: DISCONTINUED | OUTPATIENT
Start: 2020-06-05 | End: 2020-06-05

## 2020-06-05 RX ORDER — AMOXICILLIN 250 MG
1-2 CAPSULE ORAL 2 TIMES DAILY
Qty: 30 TABLET | Refills: 0 | Status: SHIPPED | OUTPATIENT
Start: 2020-06-05 | End: 2020-09-09

## 2020-06-05 RX ORDER — SODIUM CHLORIDE, SODIUM LACTATE, POTASSIUM CHLORIDE, CALCIUM CHLORIDE 600; 310; 30; 20 MG/100ML; MG/100ML; MG/100ML; MG/100ML
INJECTION, SOLUTION INTRAVENOUS CONTINUOUS
Status: DISCONTINUED | OUTPATIENT
Start: 2020-06-05 | End: 2020-06-05 | Stop reason: HOSPADM

## 2020-06-05 RX ORDER — CEFAZOLIN SODIUM 1 G/3ML
1 INJECTION, POWDER, FOR SOLUTION INTRAMUSCULAR; INTRAVENOUS SEE ADMIN INSTRUCTIONS
Status: DISCONTINUED | OUTPATIENT
Start: 2020-06-05 | End: 2020-06-05 | Stop reason: HOSPADM

## 2020-06-05 RX ORDER — INDOCYANINE GREEN AND WATER 25 MG
2.5 KIT INJECTION ONCE
Status: COMPLETED | OUTPATIENT
Start: 2020-06-05 | End: 2020-06-05

## 2020-06-05 RX ORDER — OXYCODONE HYDROCHLORIDE 5 MG/1
5 TABLET ORAL EVERY 4 HOURS PRN
Status: DISCONTINUED | OUTPATIENT
Start: 2020-06-05 | End: 2020-06-05 | Stop reason: HOSPADM

## 2020-06-05 RX ORDER — CEFAZOLIN SODIUM IN 0.9 % NACL 3 G/100 ML
3 INTRAVENOUS SOLUTION, PIGGYBACK (ML) INTRAVENOUS
Status: COMPLETED | OUTPATIENT
Start: 2020-06-05 | End: 2020-06-05

## 2020-06-05 RX ORDER — DIAZEPAM 5 MG
5 TABLET ORAL EVERY 8 HOURS PRN
Qty: 5 TABLET | Refills: 0 | Status: SHIPPED | OUTPATIENT
Start: 2020-06-05 | End: 2020-09-09

## 2020-06-05 RX ORDER — ONDANSETRON 2 MG/ML
4 INJECTION INTRAMUSCULAR; INTRAVENOUS EVERY 30 MIN PRN
Status: DISCONTINUED | OUTPATIENT
Start: 2020-06-05 | End: 2020-06-05 | Stop reason: HOSPADM

## 2020-06-05 RX ORDER — LIDOCAINE HYDROCHLORIDE 20 MG/ML
INJECTION, SOLUTION INFILTRATION; PERINEURAL PRN
Status: DISCONTINUED | OUTPATIENT
Start: 2020-06-05 | End: 2020-06-05

## 2020-06-05 RX ORDER — FLUMAZENIL 0.1 MG/ML
0.2 INJECTION, SOLUTION INTRAVENOUS
Status: DISCONTINUED | OUTPATIENT
Start: 2020-06-05 | End: 2020-06-05 | Stop reason: HOSPADM

## 2020-06-05 RX ORDER — DEXAMETHASONE SODIUM PHOSPHATE 4 MG/ML
INJECTION, SOLUTION INTRA-ARTICULAR; INTRALESIONAL; INTRAMUSCULAR; INTRAVENOUS; SOFT TISSUE PRN
Status: DISCONTINUED | OUTPATIENT
Start: 2020-06-05 | End: 2020-06-05

## 2020-06-05 RX ORDER — HYDROMORPHONE HYDROCHLORIDE 1 MG/ML
.3-.5 INJECTION, SOLUTION INTRAMUSCULAR; INTRAVENOUS; SUBCUTANEOUS EVERY 5 MIN PRN
Status: DISCONTINUED | OUTPATIENT
Start: 2020-06-05 | End: 2020-06-05 | Stop reason: HOSPADM

## 2020-06-05 RX ORDER — OXYCODONE HYDROCHLORIDE 5 MG/1
5-10 TABLET ORAL EVERY 4 HOURS PRN
Qty: 10 TABLET | Refills: 0 | Status: SHIPPED | OUTPATIENT
Start: 2020-06-05 | End: 2020-09-09

## 2020-06-05 RX ADMIN — PHENYLEPHRINE HYDROCHLORIDE 100 MCG: 10 INJECTION INTRAVENOUS at 10:23

## 2020-06-05 RX ADMIN — PROPOFOL 200 MG: 10 INJECTION, EMULSION INTRAVENOUS at 10:23

## 2020-06-05 RX ADMIN — ROCURONIUM BROMIDE 80 MG: 10 INJECTION INTRAVENOUS at 10:23

## 2020-06-05 RX ADMIN — SODIUM CHLORIDE, POTASSIUM CHLORIDE, SODIUM LACTATE AND CALCIUM CHLORIDE: 600; 310; 30; 20 INJECTION, SOLUTION INTRAVENOUS at 10:10

## 2020-06-05 RX ADMIN — FENTANYL CITRATE 150 MCG: 50 INJECTION, SOLUTION INTRAMUSCULAR; INTRAVENOUS at 10:23

## 2020-06-05 RX ADMIN — SUGAMMADEX 200 MG: 100 INJECTION, SOLUTION INTRAVENOUS at 11:33

## 2020-06-05 RX ADMIN — DEXAMETHASONE SODIUM PHOSPHATE 6 MG: 4 INJECTION, SOLUTION INTRA-ARTICULAR; INTRALESIONAL; INTRAMUSCULAR; INTRAVENOUS; SOFT TISSUE at 10:33

## 2020-06-05 RX ADMIN — MIDAZOLAM 0.5 MG: 1 INJECTION INTRAMUSCULAR; INTRAVENOUS at 09:14

## 2020-06-05 RX ADMIN — HYDROMORPHONE HYDROCHLORIDE 0.2 MG: 1 INJECTION, SOLUTION INTRAMUSCULAR; INTRAVENOUS; SUBCUTANEOUS at 12:13

## 2020-06-05 RX ADMIN — OXYCODONE HYDROCHLORIDE 5 MG: 5 TABLET ORAL at 12:24

## 2020-06-05 RX ADMIN — HYDROMORPHONE HYDROCHLORIDE 0.3 MG: 1 INJECTION, SOLUTION INTRAMUSCULAR; INTRAVENOUS; SUBCUTANEOUS at 12:06

## 2020-06-05 RX ADMIN — FENTANYL CITRATE 100 MCG: 50 INJECTION, SOLUTION INTRAMUSCULAR; INTRAVENOUS at 11:03

## 2020-06-05 RX ADMIN — Medication 3 G: at 10:29

## 2020-06-05 RX ADMIN — LIDOCAINE HYDROCHLORIDE 100 MG: 20 INJECTION, SOLUTION INFILTRATION; PERINEURAL at 10:23

## 2020-06-05 RX ADMIN — FENTANYL CITRATE 50 MCG: 50 INJECTION, SOLUTION INTRAMUSCULAR; INTRAVENOUS at 09:13

## 2020-06-05 RX ADMIN — INDOCYANINE GREEN AND WATER 2.5 MG: KIT at 08:46

## 2020-06-05 RX ADMIN — ONDANSETRON 4 MG: 2 INJECTION INTRAMUSCULAR; INTRAVENOUS at 11:29

## 2020-06-05 ASSESSMENT — ENCOUNTER SYMPTOMS: SEIZURES: 0

## 2020-06-05 ASSESSMENT — LIFESTYLE VARIABLES: TOBACCO_USE: 0

## 2020-06-05 ASSESSMENT — MIFFLIN-ST. JEOR: SCORE: 2137.25

## 2020-06-05 NOTE — PROGRESS NOTES
RECOVERY RN NOTE:  assigned as pt nurse   Report from Hellen RN via telephone @ [phone]  Pt currently tolerating PO intake without difficulty   Given PO oxy 5mg, Crackers, and Water in PACU    Mask with patient     NST assist pt with transferring from cart to chair, with dressing and obtaining VS  All belongings returned to pt at this time   No report of any items missing     Pt alert and oriented, dressed sitting up in chair and appears comfortable at this time drinking clear liquids and snacking on apple sauce.     Written instructions provided for pt. Reviewed over the phone with Ana and verbally with pt. Questions encouraged and answered. Education provided. Over the phone consent of understanding provided @ 7611. See form for details.   Ana states she should arrive to hospital in approximately 30 mins around 1350    PIV removed   Pt transport placed @ 1335  Pending pt transport to take pt down to discharge pharmacy prior to [facility] via wheelchair   NST takes pt down via wheelchair @ 1400 vs waiting for pt transport wife is waiting down stairs at this time.     In chart after pt transferred out of phase II to complete documentation

## 2020-06-05 NOTE — PROGRESS NOTES
6042 Dr. Moncada paged with Patient request to sign Surgical Consent prior to Block; will continue to monitor.    8257 RN(writer) called into OR 20 to speak with OR Circulating RN - it was conveyed that Patient requested explanation of procedure and Consent to be signed with MD prior to Block; OR Circulatning RN stated that someone from Surgical team would be out shortly and Indocyanine Green IVP should be injected at this time; Will continue to monitor.

## 2020-06-05 NOTE — DISCHARGE INSTRUCTIONS
[facility]  Same-Day Surgery   Adult Discharge Orders & Instructions   For 24 hours after surgery  1. Get plenty of rest.  A responsible adult must stay with you for at least 24 hours after you leave the hospital.   2. Do not drive or use heavy equipment.  If you have weakness or tingling, don't drive or use heavy equipment until this feeling goes away.  3. Do not drink alcohol.  4. Avoid strenuous or risky activities.  Ask for help when climbing stairs.   5. You may feel lightheaded.  IF so, sit for a few minutes before standing.  Have someone help you get up.   6. If you have nausea (feel sick to your stomach): Drink only clear liquids such as apple juice, ginger ale, broth or 7-Up.  Rest may also help.  Be sure to drink enough fluids.  Move to a regular diet as you feel able.  7. You may have a slight fever. Call the doctor if your fever is over 100 F (37.7 C) (taken under the tongue) or lasts longer than 24 hours.  8. You may have a dry mouth, a sore throat, muscle aches or trouble sleeping.  These should go away after 24 hours.  9. Do not make important or legal decisions.   Call your doctor for any of the followin.  Signs of infection (fever, growing tenderness at the surgery site, a large amount of drainage or bleeding, severe pain, foul-smelling drainage, redness, swelling).  2. It has been over 8 to 10 hours since surgery and you are still not able to urinate (pass water).  3.  Headache for over 24 hours.  4.  Numbness, tingling or weakness the day after surgery (if you had spinal anesthesia).  To contact a doctor, call Dr[facility] Phone @ [phone] or:      [phone] and ask for the resident on call for General Surgery or Dr. Moncada (answered 24 hours a day)      Emergency Department:  [facility]: [phone]       (TTY for hearing impaired: [phone])  [facility]: [phone]       (TTY for hearing impaired:  [phone])

## 2020-06-05 NOTE — BRIEF OP NOTE
[facility], [place]    Brief Operative Note    Pre-operative diagnosis: Gallstones [K80.20]  Post-operative diagnosis Same as pre-operative diagnosis    Procedure: Procedure(s):  CHOLECYSTECTOMY, ROBOT-ASSISTED, LAPAROSCOPIC  Laparoscopic Biopsy Liver  Surgeon: Surgeon(s) and Role:     * Merrick Moncada MD - Primary     * Piper Benton DO - Resident - Assisting  Anesthesia: Combined General with Block   Estimated blood loss: 4ml  Drains: None  Specimens:   ID Type Source Tests Collected by Time Destination   A : Gallbladder and Contents Tissue Gallbladder and Contents SURGICAL PATHOLOGY EXAM Merrick Moncada MD 6/5/2020 11:13 AM    B : Liver Biopsy Biopsy Liver SURGICAL PATHOLOGY EXAM Merrick Moncada MD 6/5/2020 11:18 AM      Findings:   None.  Complications: None.  Implants: * No implants in log *     DO Ryan Brenner Surgery, PGY2

## 2020-06-05 NOTE — ANESTHESIA CARE TRANSFER NOTE
Patient: Iglesia Wolff    Procedure(s):  CHOLECYSTECTOMY, ROBOT-ASSISTED, LAPAROSCOPIC  Laparoscopic Biopsy Liver    Diagnosis: Gallstones [K80.20]  Diagnosis Additional Information: No value filed.    Anesthesia Type:   General     Note:  Airway :Nasal Cannula  Patient transferred to:PACU  Comments: To PACU, awake, VSS, patent airway, report to RN.Handoff Report: Identifed the Patient, Identified the Reponsible Provider, Reviewed the pertinent medical history, Discussed the surgical course, Reviewed Intra-OP anesthesia mangement and issues during anesthesia, Set expectations for post-procedure period and Allowed opportunity for questions and acknowledgement of understanding      Vitals: (Last set prior to Anesthesia Care Transfer)    CRNA VITALS  6/5/2020 1110 - 6/5/2020 1144      6/5/2020             Pulse:  90    Ht Rate:  90    SpO2:  100 %    Resp Rate (observed):  20                Electronically Signed By: CHRISTIANO Zhao CRNA  June 5, 2020  11:44 AM

## 2020-06-05 NOTE — ANESTHESIA POSTPROCEDURE EVALUATION
Anesthesia POST Procedure Evaluation    Patient: Iglesia Wolff   MRN:     [id number] Gender:   male   Age:    50 year old :      1970        Preoperative Diagnosis: Gallstones [K80.20]   Procedure(s):  CHOLECYSTECTOMY, ROBOT-ASSISTED, LAPAROSCOPIC  Laparoscopic Biopsy Liver   Postop Comments: No value filed.     Anesthesia Type: General       Disposition: Outpatient   Postop Pain Control: Uneventful            Sign Out: Well controlled pain   PONV: No   Neuro/Psych: Uneventful            Sign Out: Acceptable/Baseline neuro status   Airway/Respiratory: Uneventful            Sign Out: Acceptable/Baseline resp. status   CV/Hemodynamics: Uneventful            Sign Out: Acceptable CV status   Other NRE: NONE   DID A NON-ROUTINE EVENT OCCUR? No         Last Anesthesia Record Vitals:  CRNA VITALS  2020 1110 - 2020 1210      2020             EKG:  Sinus rhythm          Last PACU Vitals:  Vitals Value Taken Time   /75 2020 12:15 PM   Temp 36.7  C (98  F) 2020 11:50 AM   Pulse 73 2020 12:10 PM   Resp 13 2020 12:15 PM   SpO2 94 % 2020 12:15 PM   Temp src     NIBP     Pulse     SpO2     Resp     Temp     Ht Rate     Temp 2     Vitals shown include unvalidated device data.      Electronically Signed By: Sarah Wachter, MD, 2020, 12:15 PM

## 2020-06-05 NOTE — ANESTHESIA PREPROCEDURE EVALUATION
"Anesthesia Pre-Procedure Evaluation    Patient: Iglesia Wolff   MRN:     [id number] Gender:   male   Age:    50 year old :      1970        Preoperative Diagnosis: Gallstones [K80.20]   Procedure(s):  CHOLECYSTECTOMY, ROBOT-ASSISTED, LAPAROSCOPIC     LABS:  CBC:   Lab Results   Component Value Date    WBC 7.9 2020    WBC 9.1 2009    HGB 15.9 2020    HGB 17.5 2009    HCT 45.8 2020    HCT 51.1 2009     2020     2009     BMP:   Lab Results   Component Value Date     2020     2020    POTASSIUM 4.0 2020    POTASSIUM 3.5 2020    CHLORIDE 105 2020    CHLORIDE 100 2020    CO2 29 2020    CO2 24 2020    BUN 15 2020    BUN 19 2020    CR 0.93 2020    CR 0.97 2020     (H) 2020     (H) 2020     COAGS:   Lab Results   Component Value Date    INR 1.01 2020     POC:   Lab Results   Component Value Date     (H) 2020     OTHER:   Lab Results   Component Value Date    A1C 5.9 (H) 2020    EMMA 9.2 2020    ALBUMIN 3.6 2020    PROTTOTAL 7.7 2020    ALT 72 (H) 2020    AST 37 2020    GGT 84 (H) 2020    ALKPHOS 88 2020    BILITOTAL 1.5 (H) 2020    LIPASE 111 2020    TSH 2.25 2009    CRP 9.7 (H) 2020    SED 8 2020        Preop Vitals    BP Readings from Last 3 Encounters:   20 137/89   20 116/77   20 (!) 161/106    Pulse Readings from Last 3 Encounters:   20 98   20 81   20 83      Resp Readings from Last 3 Encounters:   20 18   20 16    SpO2 Readings from Last 3 Encounters:   20 97%   20 98%   20 94%      Temp Readings from Last 1 Encounters:   20 37.2  C (98.9  F) (Oral)    Ht Readings from Last 1 Encounters:   20 1.778 m (5' 10\")      Wt Readings from Last 1 Encounters:   20 127.6 kg " "(281 lb 4.9 oz)    Estimated body mass index is 40.36 kg/m  as calculated from the following:    Height as of this encounter: 1.778 m (5' 10\").    Weight as of this encounter: 127.6 kg (281 lb 4.9 oz).     LDA:        Past Medical History:   Diagnosis Date     Gallstones      Hepatic steatosis      HTN (hypertension)      Knee pain      Lumbar radiculopathy      Migraine     Rare, \"stress-related\"      Past Surgical History:   Procedure Laterality Date     DENTAL SURGERY  1987    wisdom teeth     HERNIA REPAIR, INGUINAL RT/LT Bilateral 1979      No Known Allergies     Anesthesia Evaluation     . Pt has had prior anesthetic. Type: General    No history of anesthetic complications          ROS/MED HX    ENT/Pulmonary:     (+)JOAQUIN risk factors snores loudly, obese, , . .   (-) tobacco use, asthma and sleep apnea   Neurologic:  - neg neurologic ROS    (-) seizures, CVA and Neuropathy   Cardiovascular:     (+) hypertension----. : . . . :. . Previous cardiac testing date:results:date: results:ECG reviewed date:4/14/20 results:normal date: results:          METS/Exercise Tolerance: Comment: Uses recombinant bike x25 min per day. Denies CP & SOB. Activity somewhat limited due to knee pain. Has recently lost 15 lbs intentionally. 4 - Raking leaves, gardening   Hematologic:  - neg hematologic  ROS      (-) history of blood clots and History of Transfusion   Musculoskeletal: Comment: Knees, has had injections    Occasional LBP, has had prednisone for \"disc\" pain  (+) arthritis,  -       GI/Hepatic: Comment: Mildly elevated LFTs w/ gallstones - neg GI/hepatic ROS   (+) cholecystitis/cholelithiasis,      (-) GERD   Renal/Genitourinary:  - ROS Renal section negative       Endo: Comment: Had prednisone in March x5 days for LBP    (+) Obesity, .   (-) Type II DM   Psychiatric:  - neg psychiatric ROS       Infectious Disease:  - neg infectious disease ROS       Malignancy:      - no malignancy   Other:    (+) no H/O Chronic Pain,   "                     PHYSICAL EXAM:   Mental Status/Neuro: A/A/O; Age Appropriate   Airway: Facies: Challenging; Thick Neck  Mallampati: III  Mouth/Opening: Full  TM distance: > 6 cm  Neck ROM: Full   Respiratory: Auscultation: CTAB     Resp. Rate: Normal     Resp. Effort: Normal      CV: Rhythm: Regular  Rate: Age appropriate  Heart: Normal Sounds  Edema: None   Comments: Poor dentition       Dental: Details                  Assessment:   ASA SCORE: 3    H&P: History and physical reviewed and following examination; no interval change.   Smoking Status:  Non-Smoker/Unknown   NPO Status: NPO Appropriate     Plan:   Anes. Type:  General   Pre-Medication: None   Induction:  IV (Standard)   Airway: ETT; Oral   Access/Monitoring: PIV   Maintenance: Balanced     Postop Plan:   Postop Pain: Opioids  Postop Sedation/Airway: Not planned  Disposition: Outpatient     PONV Management:   Adult Risk Factors:, Non-Smoker, Postop Opioids   Prevention: Ondansetron     CONSENT: Direct conversation   Plan and risks discussed with: Patient   Blood Products: Consent Deferred (Minimal Blood Loss)                   Sarah Wachter, MD

## 2020-06-05 NOTE — PROGRESS NOTES
Patient received Pre-Procedure TAP Blocks; 0.5mg Versed and 50mcg Fentanyl were administered IVP; Anesthesia MDs present (Jagjit Barron Hanson) as well as RN; Patient was on continuous cardiac, blood pressure and SpO2 monitoring throughout; Patient tolerated procedure well.

## 2020-06-07 NOTE — OP NOTE
Procedure Date: 06/05/2020      PREOPERATIVE DIAGNOSIS:  Symptomatic cholelithiasis, liver disease.      POSTOPERATIVE DIAGNOSIS:  Symptomatic cholelithiasis, liver disease.      OPERATIVE PROCEDURES:   1. Laparoscopic cholecystectomy, robot-assisted.   2. Laparoscopic liver biopsy.      SURGEON:  Merrick Moncada MD      FIRST ASSISTANT:  Piper Benton DO, Surgery Resident      ANESTHESIA:  General endotracheal.      INDICATIONS FOR PROCEDURE:  The patient presents with cholelithiasis in the midst of workup for liver disease.  Informed consent was obtained.      OPERATIVE FINDINGS:  Adhesions about gallbladder, normal cystic duct artery anatomy, normal-appearing liver.  Refer to below.      DESCRIPTION OF PROCEDURE:  The patient was brought to the operating room, put under general anesthesia, abdomen widely prepped and draped in the usual sterile fashion.  Infraumbilical skin incision was made.  Fascia was secured at the midline infraumbilical.  Veress needle introduced, abdomen insufflated.  An 8 port placed here.  Ports were then placed per routine technique, a left subxiphoid, a left lateral and right lateral.  Once these ports were done, a grasper was placed to grasp the gallbladder for good retraction.  The robot was docked, and at this point, attention was turned to robot, where the adhesions were taken off the gallbladder.  The fundus was then grasped, pulled out laterally, and a Firefly technique used to identify the cystic duct.  This was dissected proximally and distally, doubly clipped with a Hem-o-Zackery clips and then transected using cutting electrocautery.  Cystic artery was identified.  It was cauterized with the fenestrated bipolar.  The gallbladder was then taken off the liver bed using electrocautery, with excellent hemostasis achieved there.  There was a small nodule hanging off of the gallbladder.  This was easily transected and then removed and sent to Pathology for permanent section.   Excellent hemostasis was achieved at the liver bed at the biopsy site.  The gallbladder was then placed in an Endocatch bag and removed without difficulty.  The abdomen was deflated.  Ports were removed.  Fascial defect was closed with 0 Vicryl, skin with subcuticular, and Steri-Strips were applied.  Estimated blood loss was minimal.  The patient tolerated the procedure well and was taken to the recovery room, where he was without difficulty or apparent complication.            KERA ANTUNEZ MD             D: 2020   T: 2020   MT: CARRIE      Name:     TEVIN GRECO   MRN:      [id number]        Account:        [id number]   :      1970           Procedure Date: 2020      Document: [id number]       cc: [facility] Billing

## 2020-06-08 ENCOUNTER — PATIENT OUTREACH (OUTPATIENT)
Dept: SURGERY | Facility: CLINIC | Age: 50
End: 2020-06-08

## 2020-06-08 ENCOUNTER — PRE VISIT (OUTPATIENT)
Dept: GASTROENTEROLOGY | Facility: CLINIC | Age: 50
End: 2020-06-08

## 2020-06-08 NOTE — PROGRESS NOTES
RN Post-Op/Post-Discharge Care Coordination Note    Mr. Iglesia Wolff is a 50 year old male who underwent laparoscopic cholecystectomy and liver biopsy on 6/5 with  Dr. Merrick Moncada.  Spoke with Patient.    Support  Patient able to care for self independently     Health Status  Fevers/chills: Patient denies any fever or chills.  Nausea/Vomiting: Patient denies nausea/vomiting.  Eating/drinking: Patient is able to eat and drink without any complaints.  Bowel habits: Patient reports having a normal bowel movement.  Drains (CHRISTIAN): N/A  Incisions: Patient denies any signs and symptoms of infection..  Wound closure:  Steri-strips. He said the steri strip came off his umbilical incision. He does have some oozing. Discussed keeping the incision clean and he may cover it prn.  Pain: he had used the Valium and Oxycodone over the weekend but is switching to Tylenol during the day. Discussed he may use Tylenol or Ibuprofen prn for pain.  New Medications:  Valium, Oxycodone, Senna    Activity/Restrictions  No lifting in excess of 15-20 pounds for 3-4 weeks    Equipment  None    Pathology reviewed with patient:  No, not yet available    Forms/Letters  No    All of his questions were answered including reviewing restrictions, and wound care.  He will call this office if he has any further questions and/or concerns.      In lieu of a post-op clinic patient that patient would like to be contacted in approximately 7-10 days via telephone.    A copy of this note was routed to the primary surgeon.      Whom and When to Call  Patient acknowledges understanding of how to manage any medication changes and   when to seek medical care.     Patient advised that if after hour medical concerns arise to please call [phone] and choose option 4 to speak to the physician on call.

## 2020-06-10 LAB — COPATH REPORT: NORMAL

## 2020-06-16 ENCOUNTER — PATIENT OUTREACH (OUTPATIENT)
Dept: SURGERY | Facility: CLINIC | Age: 50
End: 2020-06-16

## 2020-06-16 NOTE — PROGRESS NOTES
Tevin Wolff was contacted several days post procedure via telephone for a status update and elected to have a telephone follow -up call approximately 7-10 days after original contact in lieu of a clinic visit with Dr. Merrick Antunez.  He continues to do well and the steri-strips have peeled off.  The patients wounds are healed and the area is C/D/I.  He is afebrile, pain free, and alpesh po; the patient is slowly resuming his normal activity.   Discussed restrictions including no lifting in excess of 20 pounds for 3 weeks.    Pathology was reviewed with the patient: Yes. Sent a message to Dr. Gilmore to review results of liver biopsy. He is aware he will be contacted regarding this.    All of Tevin Wolff question's were answered and  he would like to return to the clinic on a PRN basis.  The patient is aware that  he may schedule a post op appointment at any time.    A copy of this note was routed to the patients surgeon.           Patient Name: TEVIN WOLFF   MR#: [id number]   Specimen #: [id number]   Collected: 6/5/2020   Received: 6/5/2020   Reported: 6/10/2020 11:12   Ordering Phy(s): MERRICK ANTUNEZ     For improved result formatting, select 'View Enhanced Report Format' under    Linked Documents section.     SPECIMEN(S):   A: Gallbladder and contents   B: Liver wedge biopsy     FINAL DIAGNOSIS:   A. Gallbladder, Cholecystectomy:   Cholelithiases     B. Liver, Wedge Biopsy:   Mild (approx. 10-15%) steatosis; negative for steatohepatitis:    -Overall no significant active parenchymal or biliary injury    -Trichrome stains shows no evidence of fibrosis     I have personally reviewed all specimens and/or slides, including the   listed special stains, and used them   with my medical judgement to determine or confirm the final diagnosis.     Electronically signed out by:     Valeri Little M.D., [facility], [facility]

## 2020-06-19 ENCOUNTER — MYC MEDICAL ADVICE (OUTPATIENT)
Dept: FAMILY MEDICINE | Facility: CLINIC | Age: 50
End: 2020-06-19

## 2020-06-19 DIAGNOSIS — K64.9 HEMORRHOIDS, UNSPECIFIED HEMORRHOID TYPE: Primary | ICD-10-CM

## 2020-06-19 NOTE — TELEPHONE ENCOUNTER
PCP,    Pt has been having hemorrhoids for the past few weeks. He has tried Preparation H, Tucks, and will be getting a sitz bath.  Would you like to refer him to colorectal surgery/ GI?  Would you like in person OV first?    Thank you,  Fran VALDOVINOS RN

## 2020-06-22 NOTE — TELEPHONE ENCOUNTER
We can refer to colorectal or general surgery, he needs to clarify which surgeon he would like to see or what his insurance covers.  Dr. Greenwood

## 2020-06-23 DIAGNOSIS — K64.4 EXTERNAL HEMORRHOID: Primary | ICD-10-CM

## 2020-06-26 NOTE — TELEPHONE ENCOUNTER
See below, requesting new referral to Dr. Ferrer - [facility]   Pended    Gabriela HOGAN RN

## 2020-06-28 NOTE — PROGRESS NOTES
Referral placed to colorectal surgery as ASAP per his request.  Please can we check with Dr. Ferrer.s office if patient can be seen soon for diagnosis of?  Hemorrhoids.  Dr. Ferrer - [facility]    Patient will need to schedule virtual video follow-up visit with this provider/establishing visit.  Patient seen once in April 2020

## 2020-06-29 ENCOUNTER — TELEPHONE (OUTPATIENT)
Dept: FAMILY MEDICINE | Facility: CLINIC | Age: 50
End: 2020-06-29

## 2020-06-29 NOTE — TELEPHONE ENCOUNTER
Sent [employer] message to patient with referral information as follow up.     Cher LIN RN,BSN

## 2020-06-29 NOTE — TELEPHONE ENCOUNTER
Referral was placed as below - please advise if new referral is needed.      Referral Notes   Number of Notes: 1   Type  Date  User  Summary  Attachment    Provider Comments  06/27/2020  9:36 PM  Tc Greenwood MD  Provider Comments  -    Note     Your provider has referred you to: [facility]: [facility] [place] [phone]   [email or web address]  Dr. Ferrer - [facility]   Referral Reason(s): Hemorrhoids

## 2020-06-29 NOTE — PROGRESS NOTES
Called patient and confirmed patient does have appointment scheduled next week with Dr Tessa Alfaro at [facility].     Please place new referral for patient to be able to be seen for that appointment.      Patient reports earliest appointment available with Dr Moss was end of August.      Separate Telephone encounter routed to Dr Greenwood with request, as can not route this encounter.     Thank you,  Cher LIN RN,BSN

## 2020-06-29 NOTE — TELEPHONE ENCOUNTER
Called patient and confirmed patient does have appointment scheduled next week with:      Dr Tessa Alfaro : [facility].     Please place new referral for patient to be able to be seen for that appointment.      Patient reports earliest appointment available with Dr Moss was end of August.      Thank you,  Cher LIN RN,BSN

## 2020-07-08 ENCOUNTER — TRANSFERRED RECORDS (OUTPATIENT)
Dept: HEALTH INFORMATION MANAGEMENT | Facility: CLINIC | Age: 50
End: 2020-07-08

## 2020-08-05 ENCOUNTER — MYC REFILL (OUTPATIENT)
Dept: FAMILY MEDICINE | Facility: CLINIC | Age: 50
End: 2020-08-05

## 2020-08-05 DIAGNOSIS — I10 BENIGN ESSENTIAL HYPERTENSION: ICD-10-CM

## 2020-08-07 RX ORDER — LOSARTAN POTASSIUM 50 MG/1
TABLET ORAL
Qty: 135 TABLET | Refills: 0 | Status: SHIPPED | OUTPATIENT
Start: 2020-08-07 | End: 2020-09-09

## 2020-08-07 RX ORDER — HYDROCHLOROTHIAZIDE 12.5 MG/1
TABLET ORAL
Qty: 135 TABLET | Refills: 0 | Status: SHIPPED | OUTPATIENT
Start: 2020-08-07 | End: 2020-09-09

## 2020-08-07 NOTE — TELEPHONE ENCOUNTER
To PCP:     Routing refill request to provider for review/approval because:  Labs out of range:  K+ (checked prior to surgery)   Potassium   Date Value Ref Range Status   06/05/2020 3.1 (L) 3.4 - 5.3 mmol/L Final     Please review and authorize if appropriate,     Thank you,   Shreya BHARDWAJ RN

## 2020-08-18 ENCOUNTER — NURSE TRIAGE (OUTPATIENT)
Dept: NURSING | Facility: CLINIC | Age: 50
End: 2020-08-18

## 2020-08-18 NOTE — TELEPHONE ENCOUNTER
Pt is to  have a procedure on 8/21/2020 through [facility] & needs a Covid-19 test.  Directed pt back to that clinic for order & where to have it completed  Lily Flores RN  [facility]    Additional Information    Negative: Nursing judgment    Negative: Nursing judgment    Negative: Nursing judgment    Negative: Nursing judgment    Negative: Information only question and nurse able to answer    Protocols used: NO PROTOCOL AVAILABLE - INFORMATION ONLY-A-[place]

## 2020-09-07 ENCOUNTER — MYC MEDICAL ADVICE (OUTPATIENT)
Dept: FAMILY MEDICINE | Facility: CLINIC | Age: 50
End: 2020-09-07

## 2020-09-07 DIAGNOSIS — I10 BENIGN ESSENTIAL HYPERTENSION: ICD-10-CM

## 2020-09-09 DIAGNOSIS — Z11.59 ENCOUNTER FOR SCREENING FOR OTHER VIRAL DISEASES: Primary | ICD-10-CM

## 2020-09-09 RX ORDER — HYDROCHLOROTHIAZIDE 12.5 MG/1
TABLET ORAL
Qty: 135 TABLET | Refills: 0 | Status: SHIPPED | OUTPATIENT
Start: 2020-09-09 | End: 2020-12-26

## 2020-09-09 RX ORDER — LOSARTAN POTASSIUM 50 MG/1
TABLET ORAL
Qty: 135 TABLET | Refills: 0 | Status: SHIPPED | OUTPATIENT
Start: 2020-09-09 | End: 2020-12-26

## 2020-09-10 ENCOUNTER — OFFICE VISIT (OUTPATIENT)
Dept: FAMILY MEDICINE | Facility: CLINIC | Age: 50
End: 2020-09-10
Payer: COMMERCIAL

## 2020-09-10 VITALS
HEIGHT: 70 IN | BODY MASS INDEX: 34.36 KG/M2 | OXYGEN SATURATION: 96 % | SYSTOLIC BLOOD PRESSURE: 113 MMHG | DIASTOLIC BLOOD PRESSURE: 72 MMHG | HEART RATE: 93 BPM | WEIGHT: 240 LBS | TEMPERATURE: 96.8 F

## 2020-09-10 DIAGNOSIS — R74.01 TRANSAMINITIS: ICD-10-CM

## 2020-09-10 DIAGNOSIS — G89.29 CHRONIC RIGHT-SIDED LOW BACK PAIN WITH RIGHT-SIDED SCIATICA: ICD-10-CM

## 2020-09-10 DIAGNOSIS — I10 BENIGN ESSENTIAL HYPERTENSION: ICD-10-CM

## 2020-09-10 DIAGNOSIS — M54.41 CHRONIC RIGHT-SIDED LOW BACK PAIN WITH RIGHT-SIDED SCIATICA: ICD-10-CM

## 2020-09-10 DIAGNOSIS — K60.2 ANAL FISSURE: ICD-10-CM

## 2020-09-10 DIAGNOSIS — R73.03 PREDIABETES: ICD-10-CM

## 2020-09-10 DIAGNOSIS — E78.5 HYPERLIPIDEMIA LDL GOAL <100: ICD-10-CM

## 2020-09-10 DIAGNOSIS — E66.01 MORBID OBESITY (H): Primary | ICD-10-CM

## 2020-09-10 LAB
ANION GAP SERPL CALCULATED.3IONS-SCNC: 9 MMOL/L (ref 3–14)
BUN SERPL-MCNC: 7 MG/DL (ref 7–30)
CALCIUM SERPL-MCNC: 10.1 MG/DL (ref 8.5–10.1)
CHLORIDE SERPL-SCNC: 103 MMOL/L (ref 94–109)
CO2 SERPL-SCNC: 27 MMOL/L (ref 20–32)
CREAT SERPL-MCNC: 0.82 MG/DL (ref 0.66–1.25)
ERYTHROCYTE [DISTWIDTH] IN BLOOD BY AUTOMATED COUNT: 13.6 % (ref 10–15)
GFR SERPL CREATININE-BSD FRML MDRD: >90 ML/MIN/{1.73_M2}
GLUCOSE SERPL-MCNC: 109 MG/DL (ref 70–99)
HBA1C MFR BLD: 5.3 % (ref 0–5.6)
HCT VFR BLD AUTO: 45.1 % (ref 40–53)
HGB BLD-MCNC: 16 G/DL (ref 13.3–17.7)
MCH RBC QN AUTO: 31.3 PG (ref 26.5–33)
MCHC RBC AUTO-ENTMCNC: 35.5 G/DL (ref 31.5–36.5)
MCV RBC AUTO: 88 FL (ref 78–100)
PLATELET # BLD AUTO: 284 10E9/L (ref 150–450)
POTASSIUM SERPL-SCNC: 3.6 MMOL/L (ref 3.4–5.3)
RBC # BLD AUTO: 5.11 10E12/L (ref 4.4–5.9)
SODIUM SERPL-SCNC: 139 MMOL/L (ref 133–144)
WBC # BLD AUTO: 10.4 10E9/L (ref 4–11)

## 2020-09-10 PROCEDURE — 90714 TD VACC NO PRESV 7 YRS+ IM: CPT | Performed by: INTERNAL MEDICINE

## 2020-09-10 PROCEDURE — 99215 OFFICE O/P EST HI 40 MIN: CPT | Mod: 25 | Performed by: INTERNAL MEDICINE

## 2020-09-10 PROCEDURE — 85027 COMPLETE CBC AUTOMATED: CPT | Performed by: INTERNAL MEDICINE

## 2020-09-10 PROCEDURE — 83036 HEMOGLOBIN GLYCOSYLATED A1C: CPT | Performed by: INTERNAL MEDICINE

## 2020-09-10 PROCEDURE — 36415 COLL VENOUS BLD VENIPUNCTURE: CPT | Performed by: INTERNAL MEDICINE

## 2020-09-10 PROCEDURE — 80048 BASIC METABOLIC PNL TOTAL CA: CPT | Performed by: INTERNAL MEDICINE

## 2020-09-10 PROCEDURE — 90471 IMMUNIZATION ADMIN: CPT | Performed by: INTERNAL MEDICINE

## 2020-09-10 RX ORDER — DIAZEPAM 5 MG
TABLET ORAL
COMMUNITY
Start: 2020-06-05 | End: 2020-09-10

## 2020-09-10 ASSESSMENT — MIFFLIN-ST. JEOR: SCORE: 1949.88

## 2020-09-10 NOTE — NURSING NOTE
Prior to immunization administration, verified patients identity using patient s name and date of birth. Please see Immunization Activity for additional information.     Screening Questionnaire for Adult Immunization    Are you sick today?   No   Do you have allergies to medications, food, a vaccine component or latex?   No   Have you ever had a serious reaction after receiving a vaccination?   No   Do you have a long-term health problem with heart, lung, kidney, or metabolic disease (e.g., diabetes), asthma, a blood disorder, no spleen, complement component deficiency, a cochlear implant, or a spinal fluid leak?  Are you on long-term aspirin therapy?   No   Do you have cancer, leukemia, HIV/AIDS, or any other immune system problem?   No   Do you have a parent, brother, or sister with an immune system problem?   No   In the past 3 months, have you taken medications that affect  your immune system, such as prednisone, other steroids, or anticancer drugs; drugs for the treatment of rheumatoid arthritis, Crohn s disease, or psoriasis; or have you had radiation treatments?   No   Have you had a seizure, or a brain or other nervous system problem?   No   During the past year, have you received a transfusion of blood or blood    products, or been given immune (gamma) globulin or antiviral drug?   No   For women: Are you pregnant or is there a chance you could become       pregnant during the next month?   No   Have you received any vaccinations in the past 4 weeks?   No     Immunization questionnaire answers were all negative.        Per orders of Dr. Slater, injection of TD given by Do Morales CMA. Patient instructed to remain in clinic for 15 minutes afterwards, and to report any adverse reaction to me immediately.       Screening performed by Do Morales CMA on 9/10/2020 at 8:21 AM.

## 2020-09-10 NOTE — PROGRESS NOTES
[place] [address] [facility] [address]  Phone: [phone]  Primary Provider: Tc Greenwood  Pre-op Performing Provider: YANIQUE CHAMPION    PREOPERATIVE EVALUATION:  Today's date: 9/10/2020    Iglesia Wolff is a 50 year old male who presents for a preoperative evaluation.    Surgical Information:  Surgery Details 9/9/2020   Surgery/Procedure: Colonoscopy and anal sphincterotomy sphincterotomy and colonoscopy   Surgery Location: [facility]   Surgeon: Tessa Alfaro Dr.   Surgery Date: 9/16/2020 9/16/20   Time of Surgery: 2 PM 2:00   Where patient plans to recover: At home with family     Fax number for surgical facility: Note does not need to be faxed, will be available electronically in Epic.  Type of Anesthesia Anticipated: Monitor Anesthesia Care     Subjective     HPI related to upcoming procedure.  50-year-old with an anal fissure who is failed medical management and is scheduled for a sphincterotomy.  He is scheduled for a colonoscopy to evaluate his anus and to screen him for routine cancer screening.  Preop Questions 9/9/2020   1. Have you ever had a heart attack or stroke? No   2. Have you ever had surgery on your heart or blood vessels, such as a stent placement, a coronary artery bypass, or surgery on an artery in your head, neck, heart, or legs? No   3. Do you have chest pain with activity? No   4. Do you have a history of  heart failure? No   5. Do you currently have a cold, bronchitis or symptoms of other infection? No   6. Do you have a cough, shortness of breath, or wheezing? No   7. Do you or anyone in your family have previous history of blood clots? No   8. Do you or does anyone in your family have a serious bleeding problem such as prolonged bleeding following surgeries or cuts? No   9. Have you ever had problems with anemia or been told to take iron pills? No   10. Have you had any abnormal blood loss such as black,  tarry or bloody stools? No   11. Have you ever had a blood transfusion? No   Are you willing to have a blood transfusion if it is medically needed before, during, or after your surgery? Yes   13. Have you or any of your relatives ever had problems with anesthesia? No   14. Do you have sleep apnea, excessive snoring or daytime drowsiness? UNKNOWN -    15. Do you have any artifical heart valves or other implanted medical devices like a pacemaker, defibrillator, or continuous glucose monitor? No   16. Do you have artificial joints? No   17. Are you allergic to latex? No     Patient does not have a Health Care Directive or Living Will: Discussed advance care planning with patient; however, patient declined at this time.    RX monitoring [place] ([place] reviewed:         Review of Systems   60 pound weight loss since his cholecystitis and subsequent cholecystectomy.  CONSTITUTIONAL: NEGATIVE for fever, chills, change in weight  INTEGUMENTARY/SKIN: NEGATIVE for worrisome rashes, moles or lesions  EYES: NEGATIVE for vision changes or irritation  ENT/MOUTH: NEGATIVE for ear, mouth and throat problems  RESP: NEGATIVE for significant cough or SOB  BREAST: NEGATIVE for masses, tenderness or discharge  CV: NEGATIVE for chest pain, palpitations or peripheral edema  GI: NEGATIVE for nausea, abdominal pain, heartburn, or change in bowel habits  : NEGATIVE for frequency, dysuria, or hematuria  MUSCULOSKELETAL: NEGATIVE for significant arthralgias or myalgia  NEURO: NEGATIVE for weakness, dizziness or paresthesias  ENDOCRINE: NEGATIVE for temperature intolerance, skin/hair changes  HEME: NEGATIVE for bleeding problems  PSYCHIATRIC: NEGATIVE for changes in mood or affect    Patient Active Problem List    Diagnosis Date Noted     Gallstones 05/20/2020     Priority: Medium     Added automatically from request for surgery [id number]       Morbid obesity (H) 04/14/2020     Priority: Medium     CARDIOVASCULAR SCREENING; LDL GOAL LESS THAN  "160 10/31/2010     Priority: Medium      Past Medical History:   Diagnosis Date     Gallstones      Hepatic steatosis      HTN (hypertension)      Knee pain      Lumbar radiculopathy      Migraine     Rare, \"stress-related\"     Past Surgical History:   Procedure Laterality Date     DAVINCI LAPAROSCOPIC CHOLECYSTECTOMY WITHOUT GRAMS N/A 6/5/2020    Procedure: CHOLECYSTECTOMY, ROBOT-ASSISTED, LAPAROSCOPIC;  Surgeon: Merrick Moncada MD;  Location: [facility] OR     DENTAL SURGERY  1987    wisdom teeth     HERNIA REPAIR, INGUINAL RT/LT Bilateral 1979     LAPAROSCOPIC BIOPSY LIVER N/A 6/5/2020    Procedure: Laparoscopic Biopsy Liver;  Surgeon: Merrick Moncada MD;  Location: [facility] OR     Current Outpatient Medications   Medication Sig Dispense Refill     hydrochlorothiazide (HYDRODIURIL) 12.5 MG tablet Take 1 tablet by mouth in the AM and 1/2 tablet in the  tablet 0     losartan (COZAAR) 50 MG tablet Take 1 tablet by mouth in the AM and 1/2 tablet in the  tablet 0     VITAMIN D PO Take 1,000 Units by mouth every morning         No Known Allergies     Social History     Tobacco Use     Smoking status: Never Smoker     Smokeless tobacco: Never Used   Substance Use Topics     Alcohol use: Yes     Comment: Rare, social use       History   Drug Use Not on file            Objective  /72 (BP Location: Right arm, Patient Position: Chair, Cuff Size: Adult Large)   Pulse 93   Temp 96.8  F (36  C) (Temporal)   Ht 1.778 m (5' 10\")   Wt 108.9 kg (240 lb)   SpO2 96%   BMI 34.44 kg/m      There were no vitals taken for this visit.  Physical Exam    GENERAL APPEARANCE: healthy, alert and no distress     EYES: EOMI,  PERRL     HENT: ear canals and TM's normal and nose and mouth without ulcers or lesions     NECK: no adenopathy, no asymmetry, masses, or scars and thyroid normal to palpation     RESP: lungs clear to auscultation - no rales, rhonchi or wheezes     CV: regular rates and rhythm, normal S1 S2, no S3 " or S4 and no murmur, click or rub     ABDOMEN:  soft, nontender, no HSM or masses and bowel sounds normal     MS: extremities normal- no gross deformities noted, no evidence of inflammation in joints, FROM in all extremities.     SKIN: no suspicious lesions or rashes     NEURO: Normal strength and tone, sensory exam grossly normal, mentation intact and speech normal     PSYCH: mentation appears normal. and affect normal/bright     LYMPHATICS: No cervical adenopathy    Recent Labs   CBC, BMP, hemoglobin A1c    PRE-OP Diagnostics:             Assessment & Plan   The proposed surgical procedure is considered LOW risk.    REVISED CARDIAC RISK INDEX  The patient has the following serious cardiovascular risks for perioperative complications:  No serious cardiac risks = 0 points    INTERPRETATION: 0 points: Class I (very low risk - 0.4% complication rate)       1. Morbid obesity (H)      2. Benign essential hypertension    - CBC with platelets  - Basic metabolic panel    3. Hyperlipidemia LDL goal <100      4. Transaminitis  Absent with biopsy during cholecystectomy in June    5. Chronic right-sided low back pain with right-sided sciatica      6. Prediabetes    - Hemoglobin A1c    7. Anal fissure    - diltiazem 2% in PLO gel; Apply 120 clicks (30 g) topically 2 times daily  Dispense: 30 g; Refill: 0    The patient has the following additional risks and recommendations for perioperative complications:     - No identified additional risk factors other than previously addressed    POSSIBLE SLEEP APNEA:         MEDICATION INSTRUCTIONS:  The morning of surgery the patient is holding his hydrochlorothiazide and losartan and he discontinued his vitamin D 1 week preoperatively          RECOMMENDATION:  APPROVAL GIVEN to proceed with proposed procedure, without further diagnostic evaluation.    No follow-ups on file.    Signed Electronically by: Francis Slater MD    Copy of this evaluation report is provided to requesting  physician.    Preop [facility] Preop Guidelines    Revised Cardiac Risk Index

## 2020-09-11 ENCOUNTER — TELEPHONE (OUTPATIENT)
Dept: FAMILY MEDICINE | Facility: CLINIC | Age: 50
End: 2020-09-11

## 2020-09-11 DIAGNOSIS — K60.2 ANAL FISSURE: ICD-10-CM

## 2020-09-11 RX ORDER — ACETAMINOPHEN 500 MG
500-1000 TABLET ORAL EVERY 6 HOURS PRN
COMMUNITY
End: 2021-04-21

## 2020-09-11 NOTE — TELEPHONE ENCOUNTER
Reason for Call:  Medication or medication refill:    Do you use a [facility]?  Name of the pharmacy and phone number for the current request:      Name of the medication requested:   diltiazem 2% in PLO gel  30 g          Other request:  Please clarify rx     Can we leave a detailed message on this number? YES    Phone number patient can be reached at: Other phone number:  [phone]    Best Time: any    Call taken on 9/11/2020 at 4:50 PM by Gabriel Vasquez

## 2020-09-11 NOTE — TELEPHONE ENCOUNTER
Routing this med dose question to Dr Greenwood.     Sig reads:  Apply 120 clicks (30 g) topically 2 times daily.     Is this correct?    Clinic and triage will be gone at 5pm---so please contact pharmacy directly with any change or to confirm dose.       Thank you,  Cher LIN RN,BSN

## 2020-09-12 NOTE — TELEPHONE ENCOUNTER
"Talked to pharmacy,  pharmacist said diltiazem does not come as PLO gel . Medication will need to be changed by prescribing physician if Dr Slater wants to order something else or diltiazem topical *ointment instead (*oint does not have \"click\" dose). Will cc Dr Slater who had seen patient.  "

## 2020-09-13 DIAGNOSIS — Z11.59 ENCOUNTER FOR SCREENING FOR OTHER VIRAL DISEASES: ICD-10-CM

## 2020-09-13 PROCEDURE — U0003 INFECTIOUS AGENT DETECTION BY NUCLEIC ACID (DNA OR RNA); SEVERE ACUTE RESPIRATORY SYNDROME CORONAVIRUS 2 (SARS-COV-2) (CORONAVIRUS DISEASE [COVID-19]), AMPLIFIED PROBE TECHNIQUE, MAKING USE OF HIGH THROUGHPUT TECHNOLOGIES AS DESCRIBED BY CMS-2020-01-R: HCPCS | Performed by: COLON & RECTAL SURGERY

## 2020-09-14 LAB
SARS-COV-2 RNA SPEC QL NAA+PROBE: NOT DETECTED
SPECIMEN SOURCE: NORMAL

## 2020-09-14 NOTE — TELEPHONE ENCOUNTER
Dr. Slater,    Please see message below. Prescribing provider needs to change Rx for diltiazem gel.      Thank you,  Fran VALDOVINOS RN

## 2020-09-16 ENCOUNTER — HOSPITAL ENCOUNTER (OUTPATIENT)
Facility: CLINIC | Age: 50
Discharge: HOME | End: 2020-09-16
Attending: COLON & RECTAL SURGERY | Admitting: COLON & RECTAL SURGERY
Payer: COMMERCIAL

## 2020-09-16 ENCOUNTER — ANESTHESIA (OUTPATIENT)
Dept: SURGERY | Facility: CLINIC | Age: 50
End: 2020-09-16
Payer: COMMERCIAL

## 2020-09-16 ENCOUNTER — ANESTHESIA EVENT (OUTPATIENT)
Dept: SURGERY | Facility: CLINIC | Age: 50
End: 2020-09-16
Payer: COMMERCIAL

## 2020-09-16 VITALS
HEIGHT: 70 IN | RESPIRATION RATE: 16 BRPM | TEMPERATURE: 97.8 F | SYSTOLIC BLOOD PRESSURE: 149 MMHG | HEART RATE: 76 BPM | WEIGHT: 233.6 LBS | OXYGEN SATURATION: 98 % | BODY MASS INDEX: 33.44 KG/M2 | DIASTOLIC BLOOD PRESSURE: 96 MMHG

## 2020-09-16 DIAGNOSIS — K50.10 CROHN'S DISEASE OF ANAL CANAL (H): Primary | ICD-10-CM

## 2020-09-16 LAB — COLONOSCOPY: NORMAL

## 2020-09-16 PROCEDURE — 25000125 ZZHC RX 250: Performed by: NURSE ANESTHETIST, CERTIFIED REGISTERED

## 2020-09-16 PROCEDURE — 88305 TISSUE EXAM BY PATHOLOGIST: CPT | Performed by: COLON & RECTAL SURGERY

## 2020-09-16 PROCEDURE — 40000306 ZZH STATISTIC PRE PROC ASSESS II: Performed by: COLON & RECTAL SURGERY

## 2020-09-16 PROCEDURE — 25000128 H RX IP 250 OP 636: Performed by: NURSE ANESTHETIST, CERTIFIED REGISTERED

## 2020-09-16 PROCEDURE — 27210794 ZZH OR GENERAL SUPPLY STERILE: Performed by: COLON & RECTAL SURGERY

## 2020-09-16 PROCEDURE — 36000050 ZZH SURGERY LEVEL 2 1ST 30 MIN: Performed by: COLON & RECTAL SURGERY

## 2020-09-16 PROCEDURE — 71000027 ZZH RECOVERY PHASE 2 EACH 15 MINS: Performed by: COLON & RECTAL SURGERY

## 2020-09-16 PROCEDURE — 40000037 ZZH STATISTIC COLONOSCOPY (OR PROCEDURE): Performed by: COLON & RECTAL SURGERY

## 2020-09-16 PROCEDURE — 36000052 ZZH SURGERY LEVEL 2 EA 15 ADDTL MIN: Performed by: COLON & RECTAL SURGERY

## 2020-09-16 PROCEDURE — 37000008 ZZH ANESTHESIA TECHNICAL FEE, 1ST 30 MIN: Performed by: COLON & RECTAL SURGERY

## 2020-09-16 PROCEDURE — 88312 SPECIAL STAINS GROUP 1: CPT | Performed by: COLON & RECTAL SURGERY

## 2020-09-16 PROCEDURE — 88312 SPECIAL STAINS GROUP 1: CPT | Mod: 26 | Performed by: COLON & RECTAL SURGERY

## 2020-09-16 PROCEDURE — 71000012 ZZH RECOVERY PHASE 1 LEVEL 1 FIRST HR: Performed by: COLON & RECTAL SURGERY

## 2020-09-16 PROCEDURE — 25800030 ZZH RX IP 258 OP 636: Performed by: ANESTHESIOLOGY

## 2020-09-16 PROCEDURE — 25000125 ZZHC RX 250: Performed by: COLON & RECTAL SURGERY

## 2020-09-16 PROCEDURE — 88305 TISSUE EXAM BY PATHOLOGIST: CPT | Mod: 26 | Performed by: COLON & RECTAL SURGERY

## 2020-09-16 PROCEDURE — 25000132 ZZH RX MED GY IP 250 OP 250 PS 637: Performed by: COLON & RECTAL SURGERY

## 2020-09-16 PROCEDURE — 37000009 ZZH ANESTHESIA TECHNICAL FEE, EACH ADDTL 15 MIN: Performed by: COLON & RECTAL SURGERY

## 2020-09-16 RX ORDER — SODIUM CHLORIDE, SODIUM LACTATE, POTASSIUM CHLORIDE, CALCIUM CHLORIDE 600; 310; 30; 20 MG/100ML; MG/100ML; MG/100ML; MG/100ML
INJECTION, SOLUTION INTRAVENOUS CONTINUOUS
Status: DISCONTINUED | OUTPATIENT
Start: 2020-09-16 | End: 2020-09-16 | Stop reason: HOSPADM

## 2020-09-16 RX ORDER — HYDRALAZINE HYDROCHLORIDE 20 MG/ML
2.5-5 INJECTION INTRAMUSCULAR; INTRAVENOUS EVERY 10 MIN PRN
Status: DISCONTINUED | OUTPATIENT
Start: 2020-09-16 | End: 2020-09-16 | Stop reason: HOSPADM

## 2020-09-16 RX ORDER — LABETALOL 20 MG/4 ML (5 MG/ML) INTRAVENOUS SYRINGE
10
Status: DISCONTINUED | OUTPATIENT
Start: 2020-09-16 | End: 2020-09-16 | Stop reason: HOSPADM

## 2020-09-16 RX ORDER — OXYCODONE HYDROCHLORIDE 5 MG/1
5 TABLET ORAL EVERY 6 HOURS PRN
Qty: 6 TABLET | Refills: 0 | Status: SHIPPED | OUTPATIENT
Start: 2020-09-16 | End: 2021-04-21

## 2020-09-16 RX ORDER — OXYCODONE HYDROCHLORIDE 5 MG/1
5 TABLET ORAL
Status: DISCONTINUED | OUTPATIENT
Start: 2020-09-16 | End: 2020-09-16 | Stop reason: HOSPADM

## 2020-09-16 RX ORDER — NALOXONE HYDROCHLORIDE 0.4 MG/ML
.1-.4 INJECTION, SOLUTION INTRAMUSCULAR; INTRAVENOUS; SUBCUTANEOUS
Status: DISCONTINUED | OUTPATIENT
Start: 2020-09-16 | End: 2020-09-16 | Stop reason: HOSPADM

## 2020-09-16 RX ORDER — LIDOCAINE 40 MG/G
CREAM TOPICAL
Status: DISCONTINUED | OUTPATIENT
Start: 2020-09-16 | End: 2020-09-16 | Stop reason: HOSPADM

## 2020-09-16 RX ORDER — MEPERIDINE HYDROCHLORIDE 25 MG/ML
12.5 INJECTION INTRAMUSCULAR; INTRAVENOUS; SUBCUTANEOUS
Status: DISCONTINUED | OUTPATIENT
Start: 2020-09-16 | End: 2020-09-16 | Stop reason: HOSPADM

## 2020-09-16 RX ORDER — FENTANYL CITRATE 50 UG/ML
INJECTION, SOLUTION INTRAMUSCULAR; INTRAVENOUS PRN
Status: DISCONTINUED | OUTPATIENT
Start: 2020-09-16 | End: 2020-09-16

## 2020-09-16 RX ORDER — DEXAMETHASONE SODIUM PHOSPHATE 4 MG/ML
INJECTION, SOLUTION INTRA-ARTICULAR; INTRALESIONAL; INTRAMUSCULAR; INTRAVENOUS; SOFT TISSUE PRN
Status: DISCONTINUED | OUTPATIENT
Start: 2020-09-16 | End: 2020-09-16

## 2020-09-16 RX ORDER — DEXAMETHASONE SODIUM PHOSPHATE 4 MG/ML
4 INJECTION, SOLUTION INTRA-ARTICULAR; INTRALESIONAL; INTRAMUSCULAR; INTRAVENOUS; SOFT TISSUE EVERY 10 MIN PRN
Status: DISCONTINUED | OUTPATIENT
Start: 2020-09-16 | End: 2020-09-16 | Stop reason: HOSPADM

## 2020-09-16 RX ORDER — NEOSTIGMINE METHYLSULFATE 1 MG/ML
VIAL (ML) INJECTION PRN
Status: DISCONTINUED | OUTPATIENT
Start: 2020-09-16 | End: 2020-09-16

## 2020-09-16 RX ORDER — BUPIVACAINE HYDROCHLORIDE AND EPINEPHRINE 2.5; 5 MG/ML; UG/ML
INJECTION, SOLUTION EPIDURAL; INFILTRATION; INTRACAUDAL; PERINEURAL PRN
Status: DISCONTINUED | OUTPATIENT
Start: 2020-09-16 | End: 2020-09-16 | Stop reason: HOSPADM

## 2020-09-16 RX ORDER — FENTANYL CITRATE 50 UG/ML
25-50 INJECTION, SOLUTION INTRAMUSCULAR; INTRAVENOUS
Status: DISCONTINUED | OUTPATIENT
Start: 2020-09-16 | End: 2020-09-16 | Stop reason: HOSPADM

## 2020-09-16 RX ORDER — METOCLOPRAMIDE HYDROCHLORIDE 5 MG/ML
10 INJECTION INTRAMUSCULAR; INTRAVENOUS EVERY 6 HOURS PRN
Status: DISCONTINUED | OUTPATIENT
Start: 2020-09-16 | End: 2020-09-16 | Stop reason: HOSPADM

## 2020-09-16 RX ORDER — KETOROLAC TROMETHAMINE 30 MG/ML
30 INJECTION, SOLUTION INTRAMUSCULAR; INTRAVENOUS EVERY 6 HOURS PRN
Status: DISCONTINUED | OUTPATIENT
Start: 2020-09-16 | End: 2020-09-16 | Stop reason: HOSPADM

## 2020-09-16 RX ORDER — PROPOFOL 10 MG/ML
INJECTION, EMULSION INTRAVENOUS PRN
Status: DISCONTINUED | OUTPATIENT
Start: 2020-09-16 | End: 2020-09-16

## 2020-09-16 RX ORDER — ONDANSETRON 2 MG/ML
4 INJECTION INTRAMUSCULAR; INTRAVENOUS EVERY 30 MIN PRN
Status: DISCONTINUED | OUTPATIENT
Start: 2020-09-16 | End: 2020-09-16 | Stop reason: HOSPADM

## 2020-09-16 RX ORDER — ONDANSETRON 4 MG/1
4 TABLET, ORALLY DISINTEGRATING ORAL EVERY 30 MIN PRN
Status: DISCONTINUED | OUTPATIENT
Start: 2020-09-16 | End: 2020-09-16 | Stop reason: HOSPADM

## 2020-09-16 RX ORDER — ACETAMINOPHEN 325 MG/1
975 TABLET ORAL ONCE
Status: COMPLETED | OUTPATIENT
Start: 2020-09-16 | End: 2020-09-16

## 2020-09-16 RX ORDER — GLYCOPYRROLATE 0.2 MG/ML
INJECTION, SOLUTION INTRAMUSCULAR; INTRAVENOUS PRN
Status: DISCONTINUED | OUTPATIENT
Start: 2020-09-16 | End: 2020-09-16

## 2020-09-16 RX ORDER — LIDOCAINE HYDROCHLORIDE 10 MG/ML
INJECTION, SOLUTION INFILTRATION; PERINEURAL PRN
Status: DISCONTINUED | OUTPATIENT
Start: 2020-09-16 | End: 2020-09-16

## 2020-09-16 RX ORDER — ONDANSETRON 2 MG/ML
INJECTION INTRAMUSCULAR; INTRAVENOUS PRN
Status: DISCONTINUED | OUTPATIENT
Start: 2020-09-16 | End: 2020-09-16

## 2020-09-16 RX ORDER — DIMENHYDRINATE 50 MG/ML
25 INJECTION, SOLUTION INTRAMUSCULAR; INTRAVENOUS
Status: DISCONTINUED | OUTPATIENT
Start: 2020-09-16 | End: 2020-09-16 | Stop reason: HOSPADM

## 2020-09-16 RX ORDER — METOCLOPRAMIDE 10 MG/1
10 TABLET ORAL EVERY 6 HOURS PRN
Status: DISCONTINUED | OUTPATIENT
Start: 2020-09-16 | End: 2020-09-16 | Stop reason: HOSPADM

## 2020-09-16 RX ADMIN — GLYCOPYRROLATE 0.4 MG: 0.2 INJECTION, SOLUTION INTRAMUSCULAR; INTRAVENOUS at 15:37

## 2020-09-16 RX ADMIN — PROPOFOL 100 MG: 10 INJECTION, EMULSION INTRAVENOUS at 15:21

## 2020-09-16 RX ADMIN — MIDAZOLAM 2 MG: 1 INJECTION INTRAMUSCULAR; INTRAVENOUS at 14:20

## 2020-09-16 RX ADMIN — PROPOFOL 200 MG: 10 INJECTION, EMULSION INTRAVENOUS at 14:31

## 2020-09-16 RX ADMIN — FENTANYL CITRATE 100 MCG: 50 INJECTION, SOLUTION INTRAMUSCULAR; INTRAVENOUS at 14:31

## 2020-09-16 RX ADMIN — PROPOFOL 50 MG: 10 INJECTION, EMULSION INTRAVENOUS at 15:33

## 2020-09-16 RX ADMIN — ROCURONIUM BROMIDE 40 MG: 10 INJECTION INTRAVENOUS at 14:31

## 2020-09-16 RX ADMIN — FENTANYL CITRATE 50 MCG: 50 INJECTION, SOLUTION INTRAMUSCULAR; INTRAVENOUS at 15:14

## 2020-09-16 RX ADMIN — SODIUM CHLORIDE, POTASSIUM CHLORIDE, SODIUM LACTATE AND CALCIUM CHLORIDE: 600; 310; 30; 20 INJECTION, SOLUTION INTRAVENOUS at 15:27

## 2020-09-16 RX ADMIN — DEXAMETHASONE SODIUM PHOSPHATE 4 MG: 4 INJECTION, SOLUTION INTRA-ARTICULAR; INTRALESIONAL; INTRAMUSCULAR; INTRAVENOUS; SOFT TISSUE at 14:31

## 2020-09-16 RX ADMIN — LIDOCAINE HYDROCHLORIDE 50 MG: 10 INJECTION, SOLUTION INFILTRATION; PERINEURAL at 14:31

## 2020-09-16 RX ADMIN — Medication 3 MG: at 15:37

## 2020-09-16 RX ADMIN — SODIUM CHLORIDE, POTASSIUM CHLORIDE, SODIUM LACTATE AND CALCIUM CHLORIDE: 600; 310; 30; 20 INJECTION, SOLUTION INTRAVENOUS at 13:04

## 2020-09-16 RX ADMIN — ONDANSETRON HYDROCHLORIDE 4 MG: 2 INJECTION, SOLUTION INTRAVENOUS at 15:26

## 2020-09-16 RX ADMIN — GLYCOPYRROLATE 0.2 MG: 0.2 INJECTION, SOLUTION INTRAMUSCULAR; INTRAVENOUS at 14:31

## 2020-09-16 RX ADMIN — ACETAMINOPHEN 975 MG: 325 TABLET, FILM COATED ORAL at 12:55

## 2020-09-16 RX ADMIN — FENTANYL CITRATE 50 MCG: 50 INJECTION, SOLUTION INTRAMUSCULAR; INTRAVENOUS at 15:28

## 2020-09-16 RX ADMIN — SODIUM CHLORIDE, POTASSIUM CHLORIDE, SODIUM LACTATE AND CALCIUM CHLORIDE: 600; 310; 30; 20 INJECTION, SOLUTION INTRAVENOUS at 14:20

## 2020-09-16 ASSESSMENT — MIFFLIN-ST. JEOR: SCORE: 1920.85

## 2020-09-16 NOTE — DISCHARGE INSTRUCTIONS
Maximum acetaminophen (Tylenol) dose from all sources should not exceed 4 grams (4000 mg) per day.  You received 975mg today at 1255.  COLONOSCOPY DISCHARGE INSTRUCTIONS    You may not drive, use heavy equipment or consume alcohol for 24 hours because the drugs you were given may cause dizziness, drowsiness, forgetfulness and slower reaction time.    You may resume your regular diet and medications.    If you had a biopsy or polypectomy done, do not take aspirin, aleve (naproxen) or ibuprofen products for the next 10 days.  Tylenol (acetaminophen) is safe to take.    What to watch for:    Problems rarely occur after the exam.  It is important for you to be aware of the early signs of a possible complication.  Call your doctor immediately if you notice any of the followin.  Unusual or persistent abdominal pain (pain that does not move around like a gas pain).    2.  Passing bright red blood from your rectum.    3.  Black or bloody stools.    4.  Temperature above 100.6 degrees F (37.5 degrees C)    If you feel this has become a medical emergency please call 911.          SEDATION ADULT DISCHARGE INSTRUCTIONS   SPECIAL PRECAUTIONS FOR 24 HOURS AFTER SURGERY    IT IS NOT UNUSUAL TO FEEL LIGHT-HEADED OR FAINT, UP TO 24 HOURS AFTER SURGERY OR WHILE TAKING PAIN MEDICATION.  IF YOU HAVE THESE SYMPTOMS; SIT FOR A FEW MINUTES BEFORE STANDING AND HAVE SOMEONE ASSIST YOU WHEN YOU GET UP TO WALK OR USE THE BATHROOM.    YOU SHOULD REST AND RELAX FOR THE NEXT 24 HOURS AND YOU MUST MAKE ARRANGEMENTS TO HAVE SOMEONE STAY WITH YOU FOR AT LEAST 24 HOURS AFTER YOUR DISCHARGE.  AVOID HAZARDOUS AND STRENUOUS ACTIVITIES.  DO NOT MAKE IMPORTANT DECISIONS FOR 24 HOURS.    DO NOT DRIVE ANY VEHICLE OR OPERATE MECHANICAL EQUIPMENT FOR 24 HOURS FOLLOWING THE END OF YOUR SURGERY.  EVEN THOUGH YOU MAY FEEL NORMAL, YOUR REACTIONS MAY BE AFFECTED BY THE MEDICATION YOU HAVE RECEIVED.    DO NOT DRINK ALCOHOLIC BEVERAGES FOR 24 HOURS FOLLOWING  YOUR SURGERY.    DRINK CLEAR LIQUIDS (APPLE JUICE, GINGER ALE, 7-UP, BROTH, ETC.).  PROGRESS TO YOUR REGULAR DIET AS YOU FEEL ABLE.    YOU MAY HAVE A DRY MOUTH, A SORE THROAT, MUSCLES ACHES OR TROUBLE SLEEPING.  THESE SHOULD GO AWAY AFTER 24 HOURS.    CALL YOUR DOCTOR FOR ANY OF THE FOLLOWING:  SIGNS OF INFECTION (FEVER, GROWING TENDERNESS AT THE SURGERY SITE, A LARGE AMOUNT OF DRAINAGE OR BLEEDING, SEVERE PAIN, FOUL-SMELLING DRAINAGE, REDNESS OR SWELLING.    IT HAS BEEN OVER 8 TO 10 HOURS SINCE SURGERY AND YOU ARE STILL NOT ABLE TO URINATE (PASS WATER).

## 2020-09-16 NOTE — ANESTHESIA CARE TRANSFER NOTE
Patient: Iglesia Wolff    Procedure(s):  intraoperative colonoscopy, Exam under anesthesia, excision of hypertrophied anal papilla    Diagnosis: Anal fissure [K60.2]  Diagnosis Additional Information: No value filed.    Anesthesia Type:   General     Note:    Patient transferred to:PACU  Comments: Pt spont resps ETT removed to PACU VSS Report to RNHandoff Report: Identifed the Patient, Identified the Reponsible Provider, Reviewed the pertinent medical history, Discussed the surgical course, Reviewed Intra-OP anesthesia mangement and issues during anesthesia, Set expectations for post-procedure period and Allowed opportunity for questions and acknowledgement of understanding      Vitals: (Last set prior to Anesthesia Care Transfer)    CRNA VITALS  9/16/2020 1520 - 9/16/2020 1553      9/16/2020             Pulse:  104    SpO2:  100 %    Resp Rate (observed):  28                Electronically Signed By: CHRISTIANO Gutierrez CRNA  September 16, 2020  3:53 PM

## 2020-09-16 NOTE — BRIEF OP NOTE
[facility]    Brief Operative Note    Pre-operative diagnosis: Anal fissure [K60.2]  Post-operative diagnosis anal fissure, fistula with inflamatory tags. Patchy inflammation of the left colon Likely Crohns    Procedure: Procedure(s):  intraoperative colonoscopy, Exam under anesthesia, excision of hypertrophied anal papilla , fistulotomy  Surgeon: Surgeon(s) and Role:     * Tessa Alfaro MD - Primary  Anesthesia: General   Estimated blood loss: Less than 10 ml  Drains: None  Specimens:   ID Type Source Tests Collected by Time Destination   A : Cecum polyps  Tissue Large Intestine, Cecum SURGICAL PATHOLOGY EXAM Tessa Alfaro MD 9/16/2020  2:41 PM    B : Terminal ileum biopsy Tissue Small Intestine, Ileum SURGICAL PATHOLOGY EXAM Tessa Alfaro MD 9/16/2020  2:50 PM    C : Right colon biopsy for rule out colitis  Tissue Large Intestine, Right/Ascending SURGICAL PATHOLOGY EXAM Tessa Alfaro MD 9/16/2020  2:54 PM    D : Transverse colon biopsy; rule out colitis Tissue Large Intestine, Transverse SURGICAL PATHOLOGY EXAM Tessa Alfaro MD 9/16/2020  3:03 PM    E : Left colon biopsy; rule out colitis Tissue Large Intestine, Left/Descending SURGICAL PATHOLOGY EXAM Tessa Alfaro MD 9/16/2020  3:03 PM    F : Sigmoid colon biopsy; rule out colitis Tissue Large Intestine, Sigmoid SURGICAL PATHOLOGY EXAM Tessa Alfaro MD 9/16/2020  3:06 PM    G : Rectum biopsy; rule out colitis Tissue Large Intestine, Rectum SURGICAL PATHOLOGY EXAM Tessa Alfaro MD 9/16/2020  3:07 PM    H : Anal lesion Tissue Anus SURGICAL PATHOLOGY EXAM Tessa Alfaro MD 9/16/2020  3:13 PM    I : Hypertrophied anal papilla Tissue Anus SURGICAL PATHOLOGY EXAM Tessa Alfaro MD 9/16/2020  3:33 PM      Findings:     2 small cecal polyps,   patchy inflammation of the left colon  anterior and posterior fissure with a anterior superficial fistula and a right lateral inflammatory tag  protruding through this.  Complications: None.  Implants: * No implants in log *

## 2020-09-16 NOTE — ANESTHESIA POSTPROCEDURE EVALUATION
Patient: Iglesia Wolff    Procedure(s):  intraoperative colonoscopy, Exam under anesthesia, excision of hypertrophied anal papilla    Diagnosis:Anal fissure [K60.2]  Diagnosis Additional Information: No value filed.    Anesthesia Type:  General    Note:  Anesthesia Post Evaluation    Patient location during evaluation: PACU  Patient participation: Able to fully participate in evaluation  Level of consciousness: awake  Pain management: adequate  Airway patency: patent  Cardiovascular status: acceptable  Respiratory status: acceptable  Hydration status: acceptable  PONV: controlled     Anesthetic complications: None          Last vitals:  Vitals:    09/16/20 1625 09/16/20 1630 09/16/20 1635   BP: (!) 141/81 (!) 147/80 (!) 144/85   Pulse: 69 64 61   Resp: 10 13 13   Temp:      SpO2: 100% 100%          Electronically Signed By: Chris Purvis MD  September 16, 2020  4:54 PM

## 2020-09-16 NOTE — ANESTHESIA PREPROCEDURE EVALUATION
"Anesthesia Pre-Procedure Evaluation    Patient: Iglesia Wolff   MRN: [id number] : 1970          Preoperative Diagnosis: Anal fissure [K60.2]    Procedure(s):  intraoperative colonoscopy, Exam under anesthesia, possible lateral internal sphincteroctomy, excision of hypertrophied anal papilla  EXAM UNDER ANESTHESIA, RECTUM, WITH LESION EXCISION    Past Medical History:   Diagnosis Date     Gallstones      Hepatic steatosis      HTN (hypertension)      Knee pain      Lumbar radiculopathy      Migraine     Rare, \"stress-related\"     Past Surgical History:   Procedure Laterality Date     DAVINCI LAPAROSCOPIC CHOLECYSTECTOMY WITHOUT GRAMS N/A 2020    Procedure: CHOLECYSTECTOMY, ROBOT-ASSISTED, LAPAROSCOPIC;  Surgeon: Merrick Moncada MD;  Location: [facility] OR     DENTAL SURGERY      wisdom teeth     HERNIA REPAIR, INGUINAL RT/LT Bilateral      LAPAROSCOPIC BIOPSY LIVER N/A 2020    Procedure: Laparoscopic Biopsy Liver;  Surgeon: Merrick Moncada MD;  Location: [facility] OR     Anesthesia Evaluation     . Pt has had prior anesthetic. Type: General           ROS/MED HX    ENT/Pulmonary:  - neg pulmonary ROS     Neurologic:  - neg neurologic ROS     Cardiovascular:     (+) hypertension----. : . . . :. .       METS/Exercise Tolerance:     Hematologic:  - neg hematologic  ROS       Musculoskeletal:  - neg musculoskeletal ROS       GI/Hepatic:     (+) cholecystitis/cholelithiasis, liver disease,       Renal/Genitourinary:  - ROS Renal section negative       Endo:     (+) Obesity, .      Psychiatric:  - neg psychiatric ROS       Infectious Disease:  - neg infectious disease ROS       Malignancy:      - no malignancy   Other:    (+) No chance of pregnancy C-spine cleared: N/A, no H/O Chronic Pain,no other significant disability   - neg other ROS                      Physical Exam  Normal systems: cardiovascular and dental    Airway   Mallampati: II  TM distance: >3 FB  Neck ROM: full    Dental " "    Cardiovascular       Pulmonary             Lab Results   Component Value Date    WBC 10.4 09/10/2020    HGB 16.0 09/10/2020    HCT 45.1 09/10/2020     09/10/2020    CRP 9.7 (H) 04/14/2020    SED 8 04/14/2020     09/10/2020    POTASSIUM 3.6 09/10/2020    CHLORIDE 103 09/10/2020    CO2 27 09/10/2020    BUN 7 09/10/2020    CR 0.82 09/10/2020     (H) 09/10/2020    EMMA 10.1 09/10/2020    ALBUMIN 3.6 05/14/2020    PROTTOTAL 7.7 05/14/2020    ALT 72 (H) 05/14/2020    AST 37 05/14/2020    GGT 84 (H) 05/14/2020    ALKPHOS 88 05/14/2020    BILITOTAL 1.5 (H) 05/14/2020    LIPASE 111 04/14/2020    INR 1.01 04/24/2020    TSH 2.25 12/16/2009       Preop Vitals  BP Readings from Last 3 Encounters:   09/16/20 125/86   09/10/20 113/72   06/05/20 136/82    Pulse Readings from Last 3 Encounters:   09/16/20 94   09/10/20 93   06/05/20 79      Resp Readings from Last 3 Encounters:   09/16/20 16   06/05/20 14   05/21/20 16    SpO2 Readings from Last 3 Encounters:   09/16/20 96%   09/10/20 96%   06/05/20 97%      Temp Readings from Last 1 Encounters:   09/16/20 97.4  F (36.3  C) (Temporal)    Ht Readings from Last 1 Encounters:   09/16/20 1.778 m (5' 10\")      Wt Readings from Last 1 Encounters:   09/16/20 106 kg (233 lb 9.6 oz)    Estimated body mass index is 33.52 kg/m  as calculated from the following:    Height as of this encounter: 1.778 m (5' 10\").    Weight as of this encounter: 106 kg (233 lb 9.6 oz).       Anesthesia Plan      History & Physical Review  History and physical reviewed and following examination; no interval change.    ASA Status:  2 .    NPO Status:  > 8 hours    Plan for General with Propofol and Intravenous induction. Maintenance will be Balanced.    PONV prophylaxis:  Ondansetron (or other 5HT-3) and Dexamethasone or Solumedrol         Postoperative Care  Postoperative pain management:  IV analgesics.      Consents  Anesthetic plan, risks, benefits and alternatives discussed with:  " Patient..                 Chase Galeano MD                    .

## 2020-09-17 NOTE — OP NOTE
Procedure Date: 09/16/2020      PREOPERATIVE DIAGNOSIS:  Anal fissure and papilla.      POSTOPERATIVE DIAGNOSIS:  Possible Crohn's colitis and perianal Crohn's with fissure and fistula with papilla.      PROCEDURES:     1.  Intraoperative colonoscopy with polypectomy x 2 and random biopsies of the colon for colitis.     2.  Exam under anesthesia, excision of papilla, superficial fistulotomy and biopsy of lesion.      SURGEON:  Tessa Alfaro MD.      ANESTHESIA:  General endotracheal anesthesia plus a local block with 30 mL of 0.25% Marcaine.      INDICATIONS:  Ada is a 50-year-old man who was seen in the office for anal pain and bleeding.  He was found to have what appeared to be a somewhat atypical fissure with a papilla that was protruding.  We treated it as a fissure and his pain persisted, although it somewhat improved with topical diltiazem and his bleeding has improved.  He has never had a colonoscopy and recommended doing that given the atypical appearance of the perianal lesions.  He was briefed on the plan for an exam under anesthesia along with the colonoscopy with risks of bleeding, infection, alteration of bowel control.  We discussed that we might consider doing a lateral internal sphincterotomy depending on the findings if there is only a fissure, and that we would biopsy or remove the papilla to better understand what is going on.  He seemed to understand and wished to proceed.      FINDINGS:  On colonoscopy, there was some patchy mild inflammation in the distal rectum.  Again, there was patchy inflammation in the sigmoid and in the descending colon and this was evidenced by decreased vascular pattern and superficial ulcerations.  There was normal vascular pattern and healthy-appearing mucosa in the transverse and ascending colon, although there was noted to be some prominent diverticulum in the transverse and descending colon.  In the cecum, there were 2 small polyps that were removed.  The terminal  ileum had appeared normal in its mucosa; however, on biopsy, it seemed firm and nodular.      The perianal exam revealed a papilla that was adherent at the dentate line from the right side, but it was protruding through a buttonhole of healed skin in the anterior midline.  Underneath this, there was a fissure.  Posteriorly, there was a partially epithelialized fissure and some other chronic tracts.  Given the multiplicity and overall appearance, this did look like Crohn's disease.      DESCRIPTION OF PROCEDURE:  After informed consent was obtained, the patient was taken to the operating room.  He was intubated and positioned in the lateral decubitus position.  After an appropriate timeout, inspection revealed a prolapsing papilla and some atypical fissures on the side, as well as posteriorly.  Digital rectal exam did not reveal any evidence of stenosis, but there was some nodularity consistent with his papilla.  The scope was then advanced in through the anal opening and traversed to the cecum without difficulty.  The prep on the right side was poor, given some tenacious material and copious irrigation was required to elevate this tenacious dark green mucous off of the mucosa.  I was able to intubate the terminal ileum and there was some nodularity, but the mucosa was grossly intact.  Some biopsies were taken here.  Within the cecum, there was noted to be 2 small polyps that were removed with the biopsy forceps.  Random biopsies of the right were performed, separate segment for the transverse colon, although those both appeared normal other than the diverticulum and the transverse colon.  Beginning in the descending colon, there was some very superficial ulcerations, aptha and this area was biopsied and sent as the descending colon.  In the sigmoid colon, there were areas that appeared normal and those that had loss of vascular pattern and the aptha ulcer.  These were also biopsied.  The upper rectum was normal and  below the third rectal valve, it appeared somewhat edematous and thickened.  This was biopsied and sent as rectum.  On retroflex, there was a prominent papilla and some smaller ones.  The largest was removed as a representative specimen.       The scope was removed and he was positioned in the prone jackknife position on the operating room table.  Perianal region was taped, prepped and draped in the usual fashion.  Inspection upon  the cheeks, I could see that there was a finger-like papilla coming out through an aperture in the anterior midline.  With retraction of the cheeks further, this came out from underneath the skin bridge and appeared to be attached on the right side.  This was sort of a button going through a buttonhole.  Once retracting this, I could see that there were other inflammatory changes right at the anal verge on the perianal skin.  Given this protruding lesion was causing him discomfort, I excised it at its base and point cautery was used to control bleeding that was minimal.  Of note, the base of it was less than 0.5 cm, whereas the papilla was probably about 2 cm long, maybe longer.      Further evaluation of the anterior midline did reveal a fissure and there was a small skin bridge over this, which the papilla had been getting caught in and I divided this.  Further inspection revealed some inflammatory changes within the anal canal suggestive of Crohn's, but there did not appear to be any undrained sepsis or ongoing infection.  The posterior midline did show a partially epithelializing broad-based fissure that extended out to an inflammatory tag suggestive of a Crohn's fissure.  Given the multiplicity of these lesions and the new diagnosis, I elected not to perform a lateral internal sphincterotomy and will send him to Gastroenterology pending the biopsies for likely treatment of Crohn's disease.      SPECIMENS:   1.  Cecal polyps x 2.   2.  Biopsies random of the terminal ileum.    3.  Biopsies of the right colon.    4.  Biopsies of the transverse colon.     5.  Biopsies of descending.     6.  Biopsies of sigmoid.     7.  Biopsies of the rectum.     8.  Distal rectal lesion from the colonoscopy.     9.  Anal skin tag done open.         ARSENIO MIXON MD             D: 2020   T: 2020   MT: RONY      Name:     TEVIN GRECO   MRN:      [id number]        Account:        [id number]   :      1970           Procedure Date: 2020      Document: [id number]       cc: Tc Mixon MD

## 2020-09-18 LAB — COPATH REPORT: NORMAL

## 2020-09-29 ENCOUNTER — TRANSFERRED RECORDS (OUTPATIENT)
Dept: HEALTH INFORMATION MANAGEMENT | Facility: CLINIC | Age: 50
End: 2020-09-29

## 2020-10-02 ENCOUNTER — TRANSFERRED RECORDS (OUTPATIENT)
Dept: HEALTH INFORMATION MANAGEMENT | Facility: CLINIC | Age: 50
End: 2020-10-02

## 2020-10-02 LAB
ALT SERPL-CCNC: 44 U/L (ref 0–78)
AST SERPL-CCNC: 30 U/L (ref 0–37)
CREAT SERPL-MCNC: 0.74 MG/DL (ref 0.7–1.3)
GLUCOSE SERPL-MCNC: 94 MG/DL (ref 74–100)
POTASSIUM SERPL-SCNC: 3.6 MMOL/L (ref 3.5–5.1)

## 2020-10-05 ENCOUNTER — TRANSFERRED RECORDS (OUTPATIENT)
Dept: HEALTH INFORMATION MANAGEMENT | Facility: CLINIC | Age: 50
End: 2020-10-05

## 2020-10-07 ENCOUNTER — TRANSFERRED RECORDS (OUTPATIENT)
Dept: HEALTH INFORMATION MANAGEMENT | Facility: CLINIC | Age: 50
End: 2020-10-07

## 2020-10-28 ENCOUNTER — TRANSFERRED RECORDS (OUTPATIENT)
Dept: HEALTH INFORMATION MANAGEMENT | Facility: CLINIC | Age: 50
End: 2020-10-28

## 2020-10-30 ENCOUNTER — TRANSFERRED RECORDS (OUTPATIENT)
Dept: HEALTH INFORMATION MANAGEMENT | Facility: CLINIC | Age: 50
End: 2020-10-30

## 2020-11-12 ENCOUNTER — MEDICAL CORRESPONDENCE (OUTPATIENT)
Dept: HEALTH INFORMATION MANAGEMENT | Facility: CLINIC | Age: 50
End: 2020-11-12

## 2020-12-09 ENCOUNTER — TRANSFERRED RECORDS (OUTPATIENT)
Dept: HEALTH INFORMATION MANAGEMENT | Facility: CLINIC | Age: 50
End: 2020-12-09

## 2021-01-10 ENCOUNTER — HEALTH MAINTENANCE LETTER (OUTPATIENT)
Age: 51
End: 2021-01-10

## 2021-02-03 ENCOUNTER — TRANSFERRED RECORDS (OUTPATIENT)
Dept: HEALTH INFORMATION MANAGEMENT | Facility: CLINIC | Age: 51
End: 2021-02-03

## 2021-04-14 ENCOUNTER — OFFICE VISIT (OUTPATIENT)
Dept: NURSING | Facility: CLINIC | Age: 51
End: 2021-04-14
Payer: COMMERCIAL

## 2021-04-14 PROCEDURE — 91300 PR COVID VAC PFIZER DIL RECON 30 MCG/0.3 ML IM: CPT

## 2021-04-14 PROCEDURE — 0001A PR COVID VAC PFIZER DIL RECON 30 MCG/0.3 ML IM: CPT

## 2021-04-21 ENCOUNTER — OFFICE VISIT (OUTPATIENT)
Dept: FAMILY MEDICINE | Facility: CLINIC | Age: 51
End: 2021-04-21
Payer: COMMERCIAL

## 2021-04-21 VITALS
OXYGEN SATURATION: 96 % | WEIGHT: 232 LBS | HEART RATE: 70 BPM | TEMPERATURE: 97.7 F | BODY MASS INDEX: 33.29 KG/M2 | SYSTOLIC BLOOD PRESSURE: 116 MMHG | DIASTOLIC BLOOD PRESSURE: 76 MMHG

## 2021-04-21 DIAGNOSIS — Z12.5 SCREENING FOR PROSTATE CANCER: ICD-10-CM

## 2021-04-21 DIAGNOSIS — K50.113 CROHN'S DISEASE OF LARGE INTESTINE WITH FISTULA (H): Chronic | ICD-10-CM

## 2021-04-21 DIAGNOSIS — E78.5 DYSLIPIDEMIA: ICD-10-CM

## 2021-04-21 DIAGNOSIS — I10 BENIGN ESSENTIAL HYPERTENSION: ICD-10-CM

## 2021-04-21 DIAGNOSIS — Z00.00 ROUTINE HISTORY AND PHYSICAL EXAMINATION OF ADULT: Primary | ICD-10-CM

## 2021-04-21 LAB
ALBUMIN SERPL-MCNC: 3.8 G/DL (ref 3.4–5)
ALP SERPL-CCNC: 70 U/L (ref 40–150)
ALT SERPL W P-5'-P-CCNC: 27 U/L (ref 0–70)
ANION GAP SERPL CALCULATED.3IONS-SCNC: 2 MMOL/L (ref 3–14)
AST SERPL W P-5'-P-CCNC: 23 U/L (ref 0–45)
BILIRUB SERPL-MCNC: 1.5 MG/DL (ref 0.2–1.3)
BUN SERPL-MCNC: 14 MG/DL (ref 7–30)
CALCIUM SERPL-MCNC: 9.3 MG/DL (ref 8.5–10.1)
CHLORIDE SERPL-SCNC: 106 MMOL/L (ref 94–109)
CHOLEST SERPL-MCNC: 168 MG/DL
CO2 SERPL-SCNC: 31 MMOL/L (ref 20–32)
CREAT SERPL-MCNC: 0.83 MG/DL (ref 0.66–1.25)
GFR SERPL CREATININE-BSD FRML MDRD: >90 ML/MIN/{1.73_M2}
GLUCOSE SERPL-MCNC: 93 MG/DL (ref 70–99)
HDLC SERPL-MCNC: 53 MG/DL
LDLC SERPL CALC-MCNC: 99 MG/DL
NONHDLC SERPL-MCNC: 115 MG/DL
POTASSIUM SERPL-SCNC: 3.8 MMOL/L (ref 3.4–5.3)
PROT SERPL-MCNC: 7.5 G/DL (ref 6.8–8.8)
SODIUM SERPL-SCNC: 139 MMOL/L (ref 133–144)
TRIGL SERPL-MCNC: 82 MG/DL

## 2021-04-21 PROCEDURE — 82043 UR ALBUMIN QUANTITATIVE: CPT | Performed by: INTERNAL MEDICINE

## 2021-04-21 PROCEDURE — 99396 PREV VISIT EST AGE 40-64: CPT | Performed by: INTERNAL MEDICINE

## 2021-04-21 PROCEDURE — 80053 COMPREHEN METABOLIC PANEL: CPT | Performed by: INTERNAL MEDICINE

## 2021-04-21 PROCEDURE — 80061 LIPID PANEL: CPT | Performed by: INTERNAL MEDICINE

## 2021-04-21 PROCEDURE — G0103 PSA SCREENING: HCPCS | Performed by: INTERNAL MEDICINE

## 2021-04-21 PROCEDURE — 99213 OFFICE O/P EST LOW 20 MIN: CPT | Mod: 25 | Performed by: INTERNAL MEDICINE

## 2021-04-21 PROCEDURE — 36415 COLL VENOUS BLD VENIPUNCTURE: CPT | Performed by: INTERNAL MEDICINE

## 2021-04-21 RX ORDER — HYDROCHLOROTHIAZIDE 12.5 MG/1
TABLET ORAL
Qty: 135 TABLET | Refills: 0 | Status: SHIPPED | OUTPATIENT
Start: 2021-04-21 | End: 2021-04-21 | Stop reason: ALTCHOICE

## 2021-04-21 RX ORDER — LOSARTAN POTASSIUM AND HYDROCHLOROTHIAZIDE 12.5; 5 MG/1; MG/1
TABLET ORAL
Qty: 135 TABLET | Refills: 0 | Status: SHIPPED | OUTPATIENT
Start: 2021-04-21 | End: 2021-07-07

## 2021-04-21 RX ORDER — LOSARTAN POTASSIUM 50 MG/1
TABLET ORAL
Qty: 135 TABLET | Refills: 0 | Status: SHIPPED | OUTPATIENT
Start: 2021-04-21 | End: 2021-04-21 | Stop reason: ALTCHOICE

## 2021-04-21 NOTE — PROGRESS NOTES
SUBJECTIVE:   CC: Iglesia Wolff is an 50 year old male who presents for preventative health visit.       Patient has been advised of split billing requirements and indicates understanding: Yes  Healthy Habits:     Getting at least 3 servings of Calcium per day:  Yes    Bi-annual eye exam:  NO    Dental care twice a year:  NO    Sleep apnea or symptoms of sleep apnea:  None    Diet:  Regular (no restrictions)    Frequency of exercise:  2-3 days/week    Duration of exercise:  15-30 minutes    Taking medications regularly:  Yes    Medication side effects:  None    PHQ-2 Total Score: 0    Additional concerns today:  No    Has rectal crohn's ,. Now on remicade, follows with coloectal surgery, was advised can not take shingrix vaccine  Has lost~ 80 lbs over last yr, first 30 lbs by exercise and diet    Had some snoring, is better since lost weight    Seen derm. For skin cancer screen,       Today's PHQ-2 Score:   PHQ-2 ( 1999 Pfizer) 4/21/2021   Q1: Little interest or pleasure in doing things 0   Q2: Feeling down, depressed or hopeless 0   PHQ-2 Score 0   Q1: Little interest or pleasure in doing things -   Q2: Feeling down, depressed or hopeless -   PHQ-2 Score -       Abuse: Current or Past(Physical, Sexual or Emotional)- No  Do you feel safe in your environment? Yes    Have you ever done Advance Care Planning? (For example, a Health Directive, POLST, or a discussion with a medical provider or your loved ones about your wishes): No, advance care planning information given to patient to review.  Patient plans to discuss their wishes with loved ones or provider.      Social History     Tobacco Use     Smoking status: Never Smoker     Smokeless tobacco: Never Used   Substance Use Topics     Alcohol use: Yes     Comment: Rare, social use     If you drink alcohol do you typically have >3 drinks per day or >7 drinks per week? No      No flowsheet data found.    Last PSA:   PSA   Date Value Ref Range Status   04/21/2021 1.64  0 - 4 ug/L Final     Comment:     Assay Method:  Chemiluminescence using Siemens Vista analyzer       Reviewed orders with patient. Reviewed health maintenance and updated orders accordingly - Yes  Lab work is in process  Labs reviewed in EPIC  BP Readings from Last 3 Encounters:   04/21/21 116/76   09/16/20 (!) 149/96   09/10/20 113/72    Wt Readings from Last 3 Encounters:   04/21/21 105.2 kg (232 lb)   09/16/20 106 kg (233 lb 9.6 oz)   09/10/20 108.9 kg (240 lb)                  Patient Active Problem List   Diagnosis     CARDIOVASCULAR SCREENING; LDL GOAL LESS THAN 160     Morbid obesity (H)     Gallstones     Crohn's disease of large intestine with fistula (H)     Past Surgical History:   Procedure Laterality Date     COLONOSCOPY N/A 9/16/2020    Procedure: 1.  Intraoperative colonoscopy with polypectomy x 2 and random biopsies of the colon for colitis.    2.  Exam under anesthesia, excision of papilla, superficial fistulotomy and biopsy of lesion.;  Surgeon: Tessa Alfaro MD;  Location:  OR     [facility] LAPAROSCOPIC CHOLECYSTECTOMY WITHOUT GRAMS N/A 6/5/2020    Procedure: CHOLECYSTECTOMY, ROBOT-ASSISTED, LAPAROSCOPIC;  Surgeon: Merrick Moncada MD;  Location:  OR     DENTAL SURGERY  1987    wisdom teeth     EXAM UNDER ANESTHESIA, FISTULOTOMY RECTUM, COMBINED N/A 9/16/2020    Procedure: Exam under anesthesia, fistulotomy rectum, combined;  Surgeon: Tessa Alfaro MD;  Location:  OR     HERNIA REPAIR, INGUINAL RT/LT Bilateral 1979     LAPAROSCOPIC BIOPSY LIVER N/A 6/5/2020    Procedure: Laparoscopic Biopsy Liver;  Surgeon: Merrick Moncada MD;  Location: [facility] OR       Social History     Tobacco Use     Smoking status: Never Smoker     Smokeless tobacco: Never Used   Substance Use Topics     Alcohol use: Yes     Comment: Rare, social use     Family History   Problem Relation Age of Onset     Family History Negative Father      Family History Negative Mother      Coronary Stenting  "Brother      Anesthesia Reaction No family hx of      Deep Vein Thrombosis (DVT) No family hx of          Current Outpatient Medications   Medication Sig Dispense Refill     losartan-hydrochlorothiazide (HYZAAR) 50-12.5 MG tablet 1.5 tablet daily 135 tablet 0     No Known Allergies  Recent Labs   Lab Test 04/21/21  1037 10/02/20 09/10/20  0820 05/14/20  0958 05/14/20  0958 04/14/20  1105 04/14/20  1105   A1C  --   --  5.3  --   --   --  5.9*   LDL 99  --   --   --  88  --  183*   HDL 53  --   --   --  39*  --  60   TRIG 82  --   --   --  104  --  139   ALT 27 44  --   --  72*   < > 698*   CR 0.83 0.74 0.82   < > 0.93  --  0.97   GFRESTIMATED >90  --  >90   < > >90  --  89   GFRESTBLACK >90  --  >90   < > >90  --  103   POTASSIUM 3.8 3.6 3.6   < > 4.0  --  3.5    < > = values in this interval not displayed.        Reviewed and updated as needed this visit by clinical staff  Tobacco  Allergies  Meds  Problems  Med Hx  Surg Hx  Fam Hx          Reviewed and updated as needed this visit by Provider  Tobacco  Allergies  Meds  Problems  Med Hx  Surg Hx  Fam Hx         Past Medical History:   Diagnosis Date     Crohn's disease of large intestine with fistula (H) 4/21/2021     Gallstones      Hepatic steatosis      HTN (hypertension)      Knee pain      Lumbar radiculopathy      Migraine     Rare, \"stress-related\"      Past Surgical History:   Procedure Laterality Date     COLONOSCOPY N/A 9/16/2020    Procedure: 1.  Intraoperative colonoscopy with polypectomy x 2 and random biopsies of the colon for colitis.    2.  Exam under anesthesia, excision of papilla, superficial fistulotomy and biopsy of lesion.;  Surgeon: Tessa Alfaro MD;  Location:  OR     [facility] LAPAROSCOPIC CHOLECYSTECTOMY WITHOUT GRAMS N/A 6/5/2020    Procedure: CHOLECYSTECTOMY, ROBOT-ASSISTED, LAPAROSCOPIC;  Surgeon: Merrick Moncada MD;  Location:  OR     DENTAL SURGERY  1987    wisdom teeth     EXAM UNDER ANESTHESIA, " FISTULOTOMY RECTUM, COMBINED N/A 9/16/2020    Procedure: Exam under anesthesia, fistulotomy rectum, combined;  Surgeon: Tessa Alfaro MD;  Location: [facility] OR     HERNIA REPAIR, INGUINAL RT/LT Bilateral 1979     LAPAROSCOPIC BIOPSY LIVER N/A 6/5/2020    Procedure: Laparoscopic Biopsy Liver;  Surgeon: Merrick Moncada MD;  Location: [facility] OR       Review of Systems  CONSTITUTIONAL: NEGATIVE for fever, chills, change in weight  INTEGUMENTARY/SKIN: NEGATIVE for worrisome rashes, moles or lesions  EYES: NEGATIVE for vision changes or irritation  ENT: NEGATIVE for ear, mouth and throat problems  RESP: NEGATIVE for significant cough or SOB  CV: NEGATIVE for chest pain, palpitations or peripheral edema  GI: NEGATIVE for nausea, abdominal pain, heartburn, or change in bowel habits   male: negative for dysuria, hematuria, decreased urinary stream, erectile dysfunction, urethral discharge  MUSCULOSKELETAL: NEGATIVE for significant arthralgias or myalgia  NEURO: NEGATIVE for weakness, dizziness or paresthesias  PSYCHIATRIC: NEGATIVE for changes in mood or affect    OBJECTIVE:   /76 (BP Location: Left arm, Patient Position: Sitting, Cuff Size: Adult Regular)   Pulse 70   Temp 97.7  F (36.5  C) (Temporal)   Wt 105.2 kg (232 lb)   SpO2 96%   BMI 33.29 kg/m      Physical Exam  GENERAL: healthy, alert and no distress  EYES: Eyes grossly normal to inspection, PERRL and conjunctivae and sclerae normal  HENT: ear canals and TM's normal, nose and mouth without ulcers or lesions  NECK: no adenopathy, no asymmetry, masses, or scars and thyroid normal to palpation  RESP: lungs clear to auscultation - no rales, rhonchi or wheezes  CV: regular rate and rhythm, normal S1 S2, no S3 or S4, no murmur, click or rub, no peripheral edema and peripheral pulses strong  ABDOMEN: soft, nontender, no hepatosplenomegaly, no masses and bowel sounds normal  MS: no gross musculoskeletal defects noted, no edema, benign appearing mole on  "left lower chest  SKIN: no suspicious lesions or rashes  NEURO: Normal strength and tone, mentation intact and speech normal  PSYCH: mentation appears normal, affect normal/bright    Diagnostic Test Results:  Labs reviewed in Epic    ASSESSMENT/PLAN:   Iglesia was seen today for physical.    Diagnoses and all orders for this visit:    Routine history and physical examination of adult    Benign essential hypertension  -     Discontinue: hydrochlorothiazide (HYDRODIURIL) 12.5 MG tablet; Take 1 tablet by mouth in the AM and 1/2 tablet in the PM  -     Discontinue: losartan (COZAAR) 50 MG tablet; Take 1 tablet by mouth in the AM and 1/2 tablet in the PM Please schedule follow-up labs and appointment visit  -     Comprehensive metabolic panel (BMP + Alb, Alk Phos, ALT, AST, Total. Bili, TP)  -     Albumin Random Urine Quantitative with Creat Ratio  -     losartan-hydrochlorothiazide (HYZAAR) 50-12.5 MG tablet; 1.5 tablet daily    Dyslipidemia  -     Lipid panel reflex to direct LDL Fasting    Screening for prostate cancer  -     PSA, screen    Crohn's disease of large intestine with fistula (H)        Continue with life style changes,   Monitor BP readings,   Check kidney, electrolytes, and lipid    Repeat colonoscopy in 3-5 yrs  Continue to follow with colorectal surgery    Patient has been advised of split billing requirements and indicates understanding: Yes  COUNSELING:   Reviewed preventive health counseling, as reflected in patient instructions       Regular exercise       Healthy diet/nutrition       Vision screening       Hearing screening       Alcohol Use        Family planning       Colon cancer screening       Prostate cancer screening       Advance Care Planning    Estimated body mass index is 33.29 kg/m  as calculated from the following:    Height as of 9/16/20: 1.778 m (5' 10\").    Weight as of this encounter: 105.2 kg (232 lb).     Weight management plan: Discussed healthy diet and exercise " guidelines    He reports that he has never smoked. He has never used smokeless tobacco.      Counseling Resources:  ATP IV Guidelines  Pooled Cohorts Equation Calculator  FRAX Risk Assessment  ICSI Preventive Guidelines  Dietary Guidelines for Americans, 2010  USDA's MyPlate  ASA Prophylaxis  Lung CA Screening    Tc Greenwood MD  [facility]

## 2021-04-22 LAB
CREAT UR-MCNC: 22 MG/DL
MICROALBUMIN UR-MCNC: <5 MG/L
MICROALBUMIN/CREAT UR: NORMAL MG/G CR (ref 0–17)
PSA SERPL-ACNC: 1.64 UG/L (ref 0–4)

## 2021-04-24 DIAGNOSIS — E78.5 DYSLIPIDEMIA: Primary | ICD-10-CM

## 2021-04-24 RX ORDER — ATORVASTATIN CALCIUM 20 MG/1
20 TABLET, FILM COATED ORAL DAILY
Qty: 90 TABLET | Refills: 0 | Status: SHIPPED | OUTPATIENT
Start: 2021-04-24 | End: 2021-05-04

## 2021-05-05 ENCOUNTER — IMMUNIZATION (OUTPATIENT)
Dept: NURSING | Facility: CLINIC | Age: 51
End: 2021-05-05
Attending: INTERNAL MEDICINE
Payer: COMMERCIAL

## 2021-05-05 PROCEDURE — 0002A PR COVID VAC PFIZER DIL RECON 30 MCG/0.3 ML IM: CPT

## 2021-05-05 PROCEDURE — 91300 PR COVID VAC PFIZER DIL RECON 30 MCG/0.3 ML IM: CPT

## 2021-07-07 DIAGNOSIS — I10 BENIGN ESSENTIAL HYPERTENSION: ICD-10-CM

## 2021-07-07 RX ORDER — LOSARTAN POTASSIUM AND HYDROCHLOROTHIAZIDE 12.5; 5 MG/1; MG/1
TABLET ORAL
Qty: 135 TABLET | Refills: 3 | Status: SHIPPED | OUTPATIENT
Start: 2021-07-07 | End: 2022-06-01

## 2021-07-07 NOTE — TELEPHONE ENCOUNTER
Prescription approved per [facility] Refill Protocol.  Deidre Morocho RN  [facility]

## 2021-08-10 DIAGNOSIS — E78.5 DYSLIPIDEMIA: ICD-10-CM

## 2021-08-11 RX ORDER — ATORVASTATIN CALCIUM 20 MG/1
20 TABLET, FILM COATED ORAL DAILY
Qty: 90 TABLET | Refills: 2 | Status: SHIPPED | OUTPATIENT
Start: 2021-08-11 | End: 2022-05-02

## 2021-10-23 ENCOUNTER — HEALTH MAINTENANCE LETTER (OUTPATIENT)
Age: 51
End: 2021-10-23

## 2022-01-06 ENCOUNTER — TRANSFERRED RECORDS (OUTPATIENT)
Dept: HEALTH INFORMATION MANAGEMENT | Facility: CLINIC | Age: 52
End: 2022-01-06
Payer: COMMERCIAL

## 2022-03-03 ENCOUNTER — TRANSFERRED RECORDS (OUTPATIENT)
Dept: HEALTH INFORMATION MANAGEMENT | Facility: CLINIC | Age: 52
End: 2022-03-03
Payer: COMMERCIAL

## 2022-03-03 LAB
ALT SERPL-CCNC: 13 IU/L (ref 0–44)
AST SERPL-CCNC: 17 IU/L (ref 0–40)

## 2022-03-11 ENCOUNTER — ALLIED HEALTH/NURSE VISIT (OUTPATIENT)
Dept: INTERNAL MEDICINE | Facility: CLINIC | Age: 52
End: 2022-03-11
Payer: COMMERCIAL

## 2022-03-11 DIAGNOSIS — Z23 NEED FOR SHINGLES VACCINE: Primary | ICD-10-CM

## 2022-03-11 PROCEDURE — 90471 IMMUNIZATION ADMIN: CPT

## 2022-03-11 PROCEDURE — 90750 HZV VACC RECOMBINANT IM: CPT

## 2022-03-29 ENCOUNTER — LAB (OUTPATIENT)
Dept: URGENT CARE | Facility: URGENT CARE | Age: 52
End: 2022-03-29
Attending: FAMILY MEDICINE
Payer: COMMERCIAL

## 2022-03-29 DIAGNOSIS — Z20.822 SUSPECTED 2019 NOVEL CORONAVIRUS INFECTION: ICD-10-CM

## 2022-03-29 PROCEDURE — U0003 INFECTIOUS AGENT DETECTION BY NUCLEIC ACID (DNA OR RNA); SEVERE ACUTE RESPIRATORY SYNDROME CORONAVIRUS 2 (SARS-COV-2) (CORONAVIRUS DISEASE [COVID-19]), AMPLIFIED PROBE TECHNIQUE, MAKING USE OF HIGH THROUGHPUT TECHNOLOGIES AS DESCRIBED BY CMS-2020-01-R: HCPCS

## 2022-03-29 PROCEDURE — U0005 INFEC AGEN DETEC AMPLI PROBE: HCPCS

## 2022-03-30 ENCOUNTER — TELEPHONE (OUTPATIENT)
Dept: NURSING | Facility: CLINIC | Age: 52
End: 2022-03-30
Payer: COMMERCIAL

## 2022-03-30 LAB — SARS-COV-2 RNA RESP QL NAA+PROBE: POSITIVE

## 2022-03-30 NOTE — TELEPHONE ENCOUNTER
Coronavirus (COVID-19) Notification    Caller Name (Patient, parent, daughter/son, grandparent, etc)  patient    Reason for call  Notify of Positive Coronavirus (COVID-19) lab results, assess symptoms,  review [facility] recommendations    Lab Result    Lab test:  2019-nCoV rRt-PCR or SARS-CoV-2 PCR    Oropharyngeal AND/OR nasopharyngeal swabs is POSITIVE for 2019-nCoV RNA/SARS-COV-2 PCR (COVID-19 virus)      Gather patient reported symptoms   Assessment   Current Symptoms at time of phone call, reported by patient: (if no symptoms, document: No symptoms] Sore throat, cough, headache, and fever   Date of symptom(s) onset (if applicable) 3/27/22     If at time of call, Patients symptoms have worsened, the Patient should contact 911 or have someone drive them to Emergency Dept promptly:      If Patient calling 911, inform 911 personal that you have tested positive for the Coronavirus (COVID-19).  Place mask on and await 911 to arrive.    If Emergency Dept, If possible, please have another adult drive you to the Emergency Dept but you need to wear mask when in contact with other people.      Treatment Options:   Patient classified as COVID treatment eligible by Epic high risk algorithm: No  You may be eligible to receive a new treatment with a monoclonal antibody for preventing hospitalization in patients at high risk for complications from COVID-19.  This medication is still experimental and available on a limited basis; it is given through an IV and must be given at an infusion center.  Please note that not all people who are eligible will receive the medication since it is in limited supply.   Is the patient symptomatic?  Yes  Is the patient interested in treatment: No.  Reason patient declined:  Other: refused    Review information with Patient    Your result was positive. This means you have COVID-19 (coronavirus).    How can I protect others?    These guidelines are for isolating before returning to work,  school or .    If you DO have symptoms    Stay home and away from others     For at least 5 days after your symptoms started, AND    You are fever free for 24 hours (with no medicine that reduces fever), AND    Your other symptoms are better    Wear a mask for 10 full days anytime you are around others    If you DON'T have symptoms    Stay home and away from others for at least 5 days after your positive test    Wear a mask for 10 full days anytime you are around others    There may be different guidelines for healthcare facilities.  Please check with the specific sites before arriving.    If you have been told by a doctor that you were severely ill with COVID-19 or are immunocompromised, you should isolate for at least 10 days.    You should not go back to work until you meet the guidelines above for ending your home isolation. You don't need to be retested for COVID-19 before going back to work--studies show that you won't spread the virus if it's been at least 10 days since your symptoms started (or 20 days, if you have a weak immune system).    Employers, schools, and daycares: This is an official notice for this person's medical guidelines for returning in-person.  They must meet the above guidelines before going back to work, school or  in person.    You will receive a positive COVID-19 letter via [employer] or the mail soon with additional self-care information (exception, no letters will be sent to presurgical/preprocedure patients).    Would you like me to review some of that information with you now?  No    If you were tested for an upcoming procedure, please contact your provider for next steps.    Annabelle León

## 2022-04-28 ENCOUNTER — TRANSFERRED RECORDS (OUTPATIENT)
Dept: HEALTH INFORMATION MANAGEMENT | Facility: CLINIC | Age: 52
End: 2022-04-28
Payer: COMMERCIAL

## 2022-05-02 DIAGNOSIS — E78.5 DYSLIPIDEMIA: ICD-10-CM

## 2022-05-02 NOTE — TELEPHONE ENCOUNTER
LOV 4- Timo    Appointments in Next Year    Jun 01, 2022 10:00 AM  (Arrive by 9:40 AM)  PHYSICAL with Tc Greenwood MD  [facility] ([facility] [place] ) [phone]        Princess Don RT (R)

## 2022-05-04 RX ORDER — ATORVASTATIN CALCIUM 20 MG/1
20 TABLET, FILM COATED ORAL DAILY
Qty: 90 TABLET | Refills: 0 | Status: SHIPPED | OUTPATIENT
Start: 2022-05-04 | End: 2022-06-01

## 2022-05-04 NOTE — TELEPHONE ENCOUNTER
Medication filled 1 time as pt is due for a follow-up in clinic. Patient is already scheduled for upcoming appointment.     Prescription approved per [facility] Refill Protocol.  Raina Santos RN

## 2022-05-29 ASSESSMENT — ENCOUNTER SYMPTOMS
JOINT SWELLING: 0
COUGH: 0
NERVOUS/ANXIOUS: 0
ARTHRALGIAS: 1
HEARTBURN: 0
PARESTHESIAS: 0
CHILLS: 0
DYSURIA: 0
ABDOMINAL PAIN: 0
HEADACHES: 0
MYALGIAS: 0
DIARRHEA: 0
PALPITATIONS: 0
HEMATURIA: 0
HEMATOCHEZIA: 0
SORE THROAT: 0
CONSTIPATION: 0
NAUSEA: 0
DIZZINESS: 0
SHORTNESS OF BREATH: 0
FREQUENCY: 0
EYE PAIN: 0
FEVER: 0
WEAKNESS: 0

## 2022-06-01 ENCOUNTER — OFFICE VISIT (OUTPATIENT)
Dept: FAMILY MEDICINE | Facility: CLINIC | Age: 52
End: 2022-06-01
Payer: COMMERCIAL

## 2022-06-01 VITALS
RESPIRATION RATE: 16 BRPM | DIASTOLIC BLOOD PRESSURE: 77 MMHG | BODY MASS INDEX: 38.51 KG/M2 | WEIGHT: 260 LBS | HEIGHT: 69 IN | HEART RATE: 63 BPM | OXYGEN SATURATION: 96 % | TEMPERATURE: 96.9 F | SYSTOLIC BLOOD PRESSURE: 122 MMHG

## 2022-06-01 DIAGNOSIS — Z00.00 ROUTINE HISTORY AND PHYSICAL EXAMINATION OF ADULT: Primary | ICD-10-CM

## 2022-06-01 DIAGNOSIS — K50.113 CROHN'S DISEASE OF LARGE INTESTINE WITH FISTULA (H): Chronic | ICD-10-CM

## 2022-06-01 DIAGNOSIS — E78.5 DYSLIPIDEMIA: ICD-10-CM

## 2022-06-01 DIAGNOSIS — E66.01 MORBID OBESITY (H): ICD-10-CM

## 2022-06-01 DIAGNOSIS — I10 BENIGN ESSENTIAL HYPERTENSION: ICD-10-CM

## 2022-06-01 DIAGNOSIS — E55.9 VITAMIN D DEFICIENCY: ICD-10-CM

## 2022-06-01 DIAGNOSIS — Z13.1 SCREENING FOR DIABETES MELLITUS: ICD-10-CM

## 2022-06-01 DIAGNOSIS — Z12.5 SCREENING FOR PROSTATE CANCER: ICD-10-CM

## 2022-06-01 DIAGNOSIS — N52.9 ERECTILE DYSFUNCTION, UNSPECIFIED ERECTILE DYSFUNCTION TYPE: ICD-10-CM

## 2022-06-01 LAB
ERYTHROCYTE [DISTWIDTH] IN BLOOD BY AUTOMATED COUNT: 12.4 % (ref 10–15)
HBA1C MFR BLD: 5.3 % (ref 0–5.6)
HCT VFR BLD AUTO: 48.8 % (ref 40–53)
HGB BLD-MCNC: 15.3 G/DL (ref 13.3–17.7)
MCH RBC QN AUTO: 29.9 PG (ref 26.5–33)
MCHC RBC AUTO-ENTMCNC: 31.4 G/DL (ref 31.5–36.5)
MCV RBC AUTO: 96 FL (ref 78–100)
PLATELET # BLD AUTO: ABNORMAL 10*3/UL
RBC # BLD AUTO: 5.11 10E6/UL (ref 4.4–5.9)
WBC # BLD AUTO: 6.1 10E3/UL (ref 4–11)

## 2022-06-01 PROCEDURE — 85048 AUTOMATED LEUKOCYTE COUNT: CPT | Performed by: INTERNAL MEDICINE

## 2022-06-01 PROCEDURE — 82306 VITAMIN D 25 HYDROXY: CPT | Performed by: INTERNAL MEDICINE

## 2022-06-01 PROCEDURE — 85014 HEMATOCRIT: CPT | Performed by: INTERNAL MEDICINE

## 2022-06-01 PROCEDURE — G0103 PSA SCREENING: HCPCS | Performed by: INTERNAL MEDICINE

## 2022-06-01 PROCEDURE — 84403 ASSAY OF TOTAL TESTOSTERONE: CPT | Performed by: INTERNAL MEDICINE

## 2022-06-01 PROCEDURE — 85041 AUTOMATED RBC COUNT: CPT | Performed by: INTERNAL MEDICINE

## 2022-06-01 PROCEDURE — 99396 PREV VISIT EST AGE 40-64: CPT | Performed by: INTERNAL MEDICINE

## 2022-06-01 PROCEDURE — 83036 HEMOGLOBIN GLYCOSYLATED A1C: CPT | Performed by: INTERNAL MEDICINE

## 2022-06-01 PROCEDURE — 80053 COMPREHEN METABOLIC PANEL: CPT | Performed by: INTERNAL MEDICINE

## 2022-06-01 PROCEDURE — 84443 ASSAY THYROID STIM HORMONE: CPT | Performed by: INTERNAL MEDICINE

## 2022-06-01 PROCEDURE — 80061 LIPID PANEL: CPT | Performed by: INTERNAL MEDICINE

## 2022-06-01 PROCEDURE — 36415 COLL VENOUS BLD VENIPUNCTURE: CPT | Performed by: INTERNAL MEDICINE

## 2022-06-01 PROCEDURE — 85018 HEMOGLOBIN: CPT | Performed by: INTERNAL MEDICINE

## 2022-06-01 PROCEDURE — 99214 OFFICE O/P EST MOD 30 MIN: CPT | Mod: 25 | Performed by: INTERNAL MEDICINE

## 2022-06-01 RX ORDER — LOSARTAN POTASSIUM AND HYDROCHLOROTHIAZIDE 12.5; 5 MG/1; MG/1
TABLET ORAL
Qty: 135 TABLET | Refills: 1 | Status: SHIPPED | OUTPATIENT
Start: 2022-06-01 | End: 2023-01-11

## 2022-06-01 RX ORDER — ATORVASTATIN CALCIUM 20 MG/1
20 TABLET, FILM COATED ORAL DAILY
Qty: 90 TABLET | Refills: 1 | Status: SHIPPED | OUTPATIENT
Start: 2022-06-01 | End: 2023-01-11

## 2022-06-01 ASSESSMENT — ENCOUNTER SYMPTOMS
ABDOMINAL PAIN: 0
CHILLS: 0
NAUSEA: 0
HEARTBURN: 0
DIZZINESS: 0
HEMATURIA: 0
DIARRHEA: 0
HEMATOCHEZIA: 0
ARTHRALGIAS: 1
SHORTNESS OF BREATH: 0
SORE THROAT: 0
CONSTIPATION: 0
MYALGIAS: 0
JOINT SWELLING: 0
EYE PAIN: 0
COUGH: 0
NERVOUS/ANXIOUS: 0
WEAKNESS: 0
FREQUENCY: 0
FEVER: 0
DYSURIA: 0
HEADACHES: 0
PALPITATIONS: 0
PARESTHESIAS: 0

## 2022-06-01 ASSESSMENT — PAIN SCALES - GENERAL: PAINLEVEL: NO PAIN (1)

## 2022-06-01 NOTE — PROGRESS NOTES
SUBJECTIVE:   CC: Iglesia Wolff is an 52 year old male who presents for preventative health visit.       Patient has been advised of split billing requirements and indicates understanding: Yes  Healthy Habits:     Getting at least 3 servings of Calcium per day:  Yes    Bi-annual eye exam:  NO    Dental care twice a year:  NO    Sleep apnea or symptoms of sleep apnea:  None    Diet:  Regular (no restrictions)    Frequency of exercise:  2-3 days/week    Duration of exercise:  15-30 minutes    Taking medications regularly:  Yes    Medication side effects:  None    PHQ-2 Total Score: 0    Additional concerns today:  No      Today's PHQ-2 Score:   PHQ-2 ( 1999 [employer]) 5/29/2022   Q1: Little interest or pleasure in doing things 0   Q2: Feeling down, depressed or hopeless 0   PHQ-2 Score 0   PHQ-2 Total Score (12-17 Years)- Positive if 3 or more points; Administer PHQ-A if positive -   Q1: Little interest or pleasure in doing things Not at all   Q2: Feeling down, depressed or hopeless Not at all   PHQ-2 Score 0       Abuse: Current or Past(Physical, Sexual or Emotional)- No  Do you feel safe in your environment? Yes        Social History     Tobacco Use     Smoking status: Never Smoker     Smokeless tobacco: Never Used   Substance Use Topics     Alcohol use: Yes     Comment: Rare, social use; 1x a week, 3-4 drinks     If you drink alcohol do you typically have >3 drinks per day or >7 drinks per week? No    Alcohol Use 6/1/2022   Prescreen: >3 drinks/day or >7 drinks/week? -   Prescreen: >3 drinks/day or >7 drinks/week? No       Last PSA:   PSA   Date Value Ref Range Status   04/21/2021 1.64 0 - 4 ug/L Final     Comment:     Assay Method:  Chemiluminescence using Siemens Vista analyzer       Reviewed orders with patient. Reviewed health maintenance and updated orders accordingly - Yes  Lab work is in process  Labs reviewed in EPIC  BP Readings from Last 3 Encounters:   06/01/22 122/77   04/21/21 116/76   09/16/20 (!)  149/96    Wt Readings from Last 3 Encounters:   06/01/22 117.9 kg (260 lb)   04/21/21 105.2 kg (232 lb)   09/16/20 106 kg (233 lb 9.6 oz)                  Patient Active Problem List   Diagnosis     CARDIOVASCULAR SCREENING; LDL GOAL LESS THAN 160     Morbid obesity (H)     Gallstones     Crohn's disease of large intestine with fistula (H)     Past Surgical History:   Procedure Laterality Date     COLONOSCOPY N/A 9/16/2020    Procedure: 1.  Intraoperative colonoscopy with polypectomy x 2 and random biopsies of the colon for colitis.    2.  Exam under anesthesia, excision of papilla, superficial fistulotomy and biopsy of lesion.;  Surgeon: Tessa Alfaro MD;  Location:  OR     DAVINCI LAPAROSCOPIC CHOLECYSTECTOMY WITHOUT GRAMS N/A 6/5/2020    Procedure: CHOLECYSTECTOMY, ROBOT-ASSISTED, LAPAROSCOPIC;  Surgeon: Merrick Moncada MD;  Location: [facility] OR     DENTAL SURGERY  1987    wisdom teeth     EXAM UNDER ANESTHESIA, FISTULOTOMY RECTUM, COMBINED N/A 9/16/2020    Procedure: Exam under anesthesia, fistulotomy rectum, combined;  Surgeon: Tessa Alfaro MD;  Location:  OR     HERNIA REPAIR, INGUINAL RT/LT Bilateral 1979     LAPAROSCOPIC BIOPSY LIVER N/A 6/5/2020    Procedure: Laparoscopic Biopsy Liver;  Surgeon: Merrick Moncada MD;  Location: [facility] OR       Social History     Tobacco Use     Smoking status: Never Smoker     Smokeless tobacco: Never Used   Substance Use Topics     Alcohol use: Yes     Comment: Rare, social use; 1x a week, 3-4 drinks     Family History   Problem Relation Age of Onset     Family History Negative Father      Family History Negative Mother      Coronary Stenting Brother      Anesthesia Reaction No family hx of      Deep Vein Thrombosis (DVT) No family hx of          Current Outpatient Medications   Medication Sig Dispense Refill     atorvastatin (LIPITOR) 20 MG tablet Take 1 tablet (20 mg) by mouth daily 90 tablet 1     losartan-hydrochlorothiazide (HYZAAR) 50-12.5 MG  "tablet 1.5 tablet daily 135 tablet 1     No Known Allergies  Recent Labs   Lab Test 06/01/22  1042 04/21/21  1037 10/02/20  0000 09/10/20  0820 06/05/20  0805 05/14/20  0958 04/24/20  1141 04/14/20  1105   A1C 5.3  --   --  5.3  --   --   --  5.9*   LDL  --  99  --   --   --  88  --  183*   HDL  --  53  --   --   --  39*  --  60   TRIG  --  82  --   --   --  104  --  139   ALT  --  27 44  --   --  72*   < > 698*   CR  --  0.83 0.74 0.82   < > 0.93  --  0.97   GFRESTIMATED  --  >90  --  >90   < > >90  --  89   GFRESTBLACK  --  >90  --  >90   < > >90  --  103   POTASSIUM  --  3.8 3.6 3.6   < > 4.0  --  3.5    < > = values in this interval not displayed.        Reviewed and updated as needed this visit by clinical staff   Tobacco  Allergies  Meds  Problems   Surg Hx             Reviewed and updated as needed this visit by Provider    Allergies   Problems   Surg Hx            Past Medical History:   Diagnosis Date     Crohn's disease of large intestine with fistula (H) 4/21/2021     Gallstones      Hepatic steatosis      HTN (hypertension)      Knee pain      Lumbar radiculopathy      Migraine     Rare, \"stress-related\"      Past Surgical History:   Procedure Laterality Date     COLONOSCOPY N/A 9/16/2020    Procedure: 1.  Intraoperative colonoscopy with polypectomy x 2 and random biopsies of the colon for colitis.    2.  Exam under anesthesia, excision of papilla, superficial fistulotomy and biopsy of lesion.;  Surgeon: Tessa Alfaro MD;  Location:  OR     DAVINCI LAPAROSCOPIC CHOLECYSTECTOMY WITHOUT GRAMS N/A 6/5/2020    Procedure: CHOLECYSTECTOMY, ROBOT-ASSISTED, LAPAROSCOPIC;  Surgeon: Merrick Moncada MD;  Location: [facility] OR     DENTAL SURGERY  1987    wisdom teeth     EXAM UNDER ANESTHESIA, FISTULOTOMY RECTUM, COMBINED N/A 9/16/2020    Procedure: Exam under anesthesia, fistulotomy rectum, combined;  Surgeon: Tessa Alfaro MD;  Location:  OR     HERNIA REPAIR, INGUINAL RT/LT Bilateral " 1979     LAPAROSCOPIC BIOPSY LIVER N/A 6/5/2020    Procedure: Laparoscopic Biopsy Liver;  Surgeon: Merrick Moncada MD;  Location: [facility] OR       Review of Systems   Constitutional: Negative for chills and fever.   HENT: Negative for congestion, ear pain, hearing loss and sore throat.    Eyes: Negative for pain and visual disturbance.   Respiratory: Negative for cough and shortness of breath.    Cardiovascular: Negative for chest pain, palpitations and peripheral edema.   Gastrointestinal: Negative for abdominal pain, constipation, diarrhea, heartburn, hematochezia and nausea.   Genitourinary: Positive for impotence. Negative for dysuria, frequency, genital sores, hematuria, penile discharge and urgency.   Musculoskeletal: Positive for arthralgias. Negative for joint swelling and myalgias.   Skin: Negative for rash.   Neurological: Negative for dizziness, weakness, headaches and paresthesias.   Psychiatric/Behavioral: Negative for mood changes. The patient is not nervous/anxious.      CONSTITUTIONAL: NEGATIVE for fever, chills, change in weight  INTEGUMENTARY/SKIN: NEGATIVE for worrisome rashes, moles or lesions  EYES: NEGATIVE for vision changes or irritation  ENT: NEGATIVE for ear, mouth and throat problems  RESP: NEGATIVE for significant cough or SOB  CV: NEGATIVE for chest pain, palpitations or peripheral edema  GI: NEGATIVE for nausea, abdominal pain, heartburn, or change in bowel habits   male: negative for dysuria, hematuria, decreased urinary stream, erectile dysfunction, urethral discharge  MUSCULOSKELETAL: NEGATIVE for significant arthralgias or myalgia  NEURO: NEGATIVE for weakness, dizziness or paresthesias  ENDOCRINE: NEGATIVE for temperature intolerance, skin/hair changes  PSYCHIATRIC: NEGATIVE for changes in mood or affect    OBJECTIVE:   /77 (BP Location: Left arm, Patient Position: Chair, Cuff Size: Adult Large)   Pulse 63   Temp 96.9  F (36.1  C) (Temporal)   Resp 16   Ht 1.759 m (5'  "9.25\")   Wt 117.9 kg (260 lb)   SpO2 96%   BMI 38.12 kg/m      Physical Exam  GENERAL: healthy, alert and no distress  EYES: Eyes grossly normal to inspection, PERRL and conjunctivae and sclerae normal  HENT: ear canals and TM's normal, nose and mouth without ulcers or lesions  NECK: no adenopathy, no asymmetry, masses, or scars and thyroid normal to palpation  RESP: lungs clear to auscultation - no rales, rhonchi or wheezes  CV: regular rate and rhythm, normal S1 S2, no S3 or S4, no murmur, click or rub, no peripheral edema and peripheral pulses strong  ABDOMEN: soft, nontender, no hepatosplenomegaly, no masses and bowel sounds normal  RECTAL: normal sphincter tone, no rectal masses, prostate normal size, smooth, nontender without nodules or masses  MS: no gross musculoskeletal defects noted, no edema  SKIN: no suspicious lesions or rashes  NEURO: Normal strength and tone, mentation intact and speech normal  PSYCH: mentation appears normal, affect normal/bright    Diagnostic Test Results:  Labs reviewed in Epic    ASSESSMENT/PLAN:   Iglesia was seen today for physical.    Diagnoses and all orders for this visit:    Routine history and physical examination of adult    Dyslipidemia  -     atorvastatin (LIPITOR) 20 MG tablet; Take 1 tablet (20 mg) by mouth daily  -     Lipid panel reflex to direct LDL Fasting    Benign essential hypertension  -     losartan-hydrochlorothiazide (HYZAAR) 50-12.5 MG tablet; 1.5 tablet daily  -     Comprehensive metabolic panel (BMP + Alb, Alk Phos, ALT, AST, Total. Bili, TP)  -     CBC with platelets; Future  -     CBC with platelets    Erectile dysfunction, unspecified erectile dysfunction type  -     Testosterone, total; Future  -     TSH with free T4 reflex  -     Testosterone, total    Vitamin D deficiency  -     Vitamin D Deficiency    Screening for diabetes mellitus  -     Hemoglobin A1c    Screening for prostate cancer  -     PSA, screen    Crohn's disease of large intestine " "with fistula (H)  Comments:  on remicade,     Morbid obesity (H)    Other orders  -     REVIEW OF HEALTH MAINTENANCE PROTOCOL ORDERS    Patient has tried phosphodiesterase inhibitors in the past did not find them helpful for his ED symptoms he will check a testosterone 8 AM level check thyroid function test.  Minimal alcohol intake he does drink socially on the weekends and summertime.  Advised to keep use to minimal.  Blood pressure seems well controlled at goal continue same dose of Hyzaar 50/12.51- 1/2 tablet daily.  Continue on statins well-tolerated.  Follow-up in 3 months and as needed continue follow-up with GI.  Continue routine screening for colorectal cancer every 2 to 3 years for colonoscopy.  He is on Remicade well-tolerated, no side effects.  He declines COVID booster vaccine second shot.  Continue with lifestyle changes exercise activities and weight loss as tolerated.  He denies any history of apneas he feels his snoring is better as he lost weight .      Patient has been advised of split billing requirements and indicates understanding: Yes    COUNSELING:   Reviewed preventive health counseling, as reflected in patient instructions       Regular exercise       Healthy diet/nutrition       Vision screening       Hearing screening       MMR vaccine reminder (1 dose) for patients born after 1958 with no documented vaccination/immunity        Aspirin prophylaxis        Alcohol Use        Family planning       Safe sex practices/STD prevention       Consider Hep C screening for all patients one time for ages 18-79 years       Advance Care Planning    Estimated body mass index is 38.12 kg/m  as calculated from the following:    Height as of this encounter: 1.759 m (5' 9.25\").    Weight as of this encounter: 117.9 kg (260 lb).     Weight management plan: Discussed healthy diet and exercise guidelines    He reports that he has never smoked. He has never used smokeless tobacco.      Counseling Resources:  ATP " IV Guidelines  Pooled Cohorts Equation Calculator  FRAX Risk Assessment  ICSI Preventive Guidelines  Dietary Guidelines for Americans, 2010  USDA's MyPlate  ASA Prophylaxis  Lung CA Screening    Tc Greenwood MD  [facility]

## 2022-06-02 LAB
ALBUMIN SERPL-MCNC: 3.9 G/DL (ref 3.4–5)
ALP SERPL-CCNC: 80 U/L (ref 40–150)
ALT SERPL W P-5'-P-CCNC: 30 U/L (ref 0–70)
ANION GAP SERPL CALCULATED.3IONS-SCNC: 6 MMOL/L (ref 3–14)
AST SERPL W P-5'-P-CCNC: 28 U/L (ref 0–45)
BILIRUB SERPL-MCNC: 1.9 MG/DL (ref 0.2–1.3)
BUN SERPL-MCNC: 14 MG/DL (ref 7–30)
CALCIUM SERPL-MCNC: 10.2 MG/DL (ref 8.5–10.1)
CHLORIDE BLD-SCNC: 109 MMOL/L (ref 94–109)
CHOLEST SERPL-MCNC: 117 MG/DL
CO2 SERPL-SCNC: 26 MMOL/L (ref 20–32)
CREAT SERPL-MCNC: 0.85 MG/DL (ref 0.66–1.25)
DEPRECATED CALCIDIOL+CALCIFEROL SERPL-MC: 37 UG/L (ref 20–75)
FASTING STATUS PATIENT QL REPORTED: YES
GFR SERPL CREATININE-BSD FRML MDRD: >90 ML/MIN/1.73M2
GLUCOSE BLD-MCNC: 101 MG/DL (ref 70–99)
HDLC SERPL-MCNC: 53 MG/DL
LDLC SERPL CALC-MCNC: 46 MG/DL
NONHDLC SERPL-MCNC: 64 MG/DL
POTASSIUM BLD-SCNC: 4.1 MMOL/L (ref 3.4–5.3)
PROT SERPL-MCNC: 7.9 G/DL (ref 6.8–8.8)
PSA SERPL-MCNC: 2.13 UG/L (ref 0–4)
SODIUM SERPL-SCNC: 141 MMOL/L (ref 133–144)
TRIGL SERPL-MCNC: 92 MG/DL
TSH SERPL DL<=0.005 MIU/L-ACNC: 1.52 MU/L (ref 0.4–4)

## 2022-06-03 LAB — TESTOST SERPL-MCNC: 554 NG/DL (ref 240–950)

## 2022-06-14 ENCOUNTER — ALLIED HEALTH/NURSE VISIT (OUTPATIENT)
Dept: INTERNAL MEDICINE | Facility: CLINIC | Age: 52
End: 2022-06-14
Payer: COMMERCIAL

## 2022-06-14 DIAGNOSIS — Z23 NEED FOR VACCINATION: Primary | ICD-10-CM

## 2022-06-14 PROCEDURE — 90471 IMMUNIZATION ADMIN: CPT

## 2022-06-14 PROCEDURE — 90750 HZV VACC RECOMBINANT IM: CPT

## 2022-06-23 ENCOUNTER — TRANSFERRED RECORDS (OUTPATIENT)
Dept: HEALTH INFORMATION MANAGEMENT | Facility: CLINIC | Age: 52
End: 2022-06-23

## 2022-08-18 ENCOUNTER — TRANSFERRED RECORDS (OUTPATIENT)
Dept: HEALTH INFORMATION MANAGEMENT | Facility: CLINIC | Age: 52
End: 2022-08-18

## 2022-08-24 ENCOUNTER — TRANSFERRED RECORDS (OUTPATIENT)
Dept: HEALTH INFORMATION MANAGEMENT | Facility: CLINIC | Age: 52
End: 2022-08-24

## 2022-10-09 ENCOUNTER — HEALTH MAINTENANCE LETTER (OUTPATIENT)
Age: 52
End: 2022-10-09

## 2022-10-13 ENCOUNTER — TRANSFERRED RECORDS (OUTPATIENT)
Dept: HEALTH INFORMATION MANAGEMENT | Facility: CLINIC | Age: 52
End: 2022-10-13

## 2022-12-08 ENCOUNTER — TRANSFERRED RECORDS (OUTPATIENT)
Dept: HEALTH INFORMATION MANAGEMENT | Facility: CLINIC | Age: 52
End: 2022-12-08

## 2023-01-10 DIAGNOSIS — E78.5 DYSLIPIDEMIA: ICD-10-CM

## 2023-01-10 DIAGNOSIS — I10 BENIGN ESSENTIAL HYPERTENSION: ICD-10-CM

## 2023-01-11 RX ORDER — ATORVASTATIN CALCIUM 20 MG/1
20 TABLET, FILM COATED ORAL DAILY
Qty: 90 TABLET | Refills: 0 | Status: SHIPPED | OUTPATIENT
Start: 2023-01-11 | End: 2023-04-10

## 2023-01-11 RX ORDER — LOSARTAN POTASSIUM AND HYDROCHLOROTHIAZIDE 12.5; 5 MG/1; MG/1
TABLET ORAL
Qty: 135 TABLET | Refills: 0 | Status: SHIPPED | OUTPATIENT
Start: 2023-01-11 | End: 2023-04-10

## 2023-02-02 ENCOUNTER — TRANSFERRED RECORDS (OUTPATIENT)
Dept: HEALTH INFORMATION MANAGEMENT | Facility: CLINIC | Age: 53
End: 2023-02-02
Payer: COMMERCIAL

## 2023-02-02 LAB
ALT SERPL-CCNC: 19 IU/L (ref 0–44)
AST SERPL-CCNC: 15 IU/L (ref 0–40)

## 2023-02-14 ENCOUNTER — TRANSFERRED RECORDS (OUTPATIENT)
Dept: HEALTH INFORMATION MANAGEMENT | Facility: CLINIC | Age: 53
End: 2023-02-14

## 2023-03-30 ENCOUNTER — TRANSFERRED RECORDS (OUTPATIENT)
Dept: HEALTH INFORMATION MANAGEMENT | Facility: CLINIC | Age: 53
End: 2023-03-30
Payer: COMMERCIAL

## 2023-04-08 DIAGNOSIS — I10 BENIGN ESSENTIAL HYPERTENSION: ICD-10-CM

## 2023-04-08 DIAGNOSIS — E78.5 DYSLIPIDEMIA: ICD-10-CM

## 2023-04-10 RX ORDER — ATORVASTATIN CALCIUM 20 MG/1
20 TABLET, FILM COATED ORAL DAILY
Qty: 90 TABLET | Refills: 0 | Status: SHIPPED | OUTPATIENT
Start: 2023-04-10 | End: 2023-07-17

## 2023-04-10 RX ORDER — LOSARTAN POTASSIUM AND HYDROCHLOROTHIAZIDE 12.5; 5 MG/1; MG/1
TABLET ORAL
Qty: 135 TABLET | Refills: 0 | Status: SHIPPED | OUTPATIENT
Start: 2023-04-10 | End: 2023-07-05

## 2023-05-25 ENCOUNTER — TRANSFERRED RECORDS (OUTPATIENT)
Dept: HEALTH INFORMATION MANAGEMENT | Facility: CLINIC | Age: 53
End: 2023-05-25
Payer: COMMERCIAL

## 2023-07-17 DIAGNOSIS — E78.5 DYSLIPIDEMIA: ICD-10-CM

## 2023-07-17 RX ORDER — ATORVASTATIN CALCIUM 20 MG/1
TABLET, FILM COATED ORAL
Qty: 90 TABLET | Refills: 0 | Status: SHIPPED | OUTPATIENT
Start: 2023-07-17 | End: 2023-10-16

## 2023-07-20 ENCOUNTER — TRANSFERRED RECORDS (OUTPATIENT)
Dept: HEALTH INFORMATION MANAGEMENT | Facility: CLINIC | Age: 53
End: 2023-07-20
Payer: COMMERCIAL

## 2023-07-20 LAB
ALT SERPL-CCNC: 17 IU/L (ref 0–40)
AST SERPL-CCNC: 17 IU/L (ref 0–40)

## 2023-08-19 ENCOUNTER — HEALTH MAINTENANCE LETTER (OUTPATIENT)
Age: 53
End: 2023-08-19

## 2023-09-14 ENCOUNTER — TRANSFERRED RECORDS (OUTPATIENT)
Dept: HEALTH INFORMATION MANAGEMENT | Facility: CLINIC | Age: 53
End: 2023-09-14
Payer: COMMERCIAL

## 2023-09-25 DIAGNOSIS — I10 BENIGN ESSENTIAL HYPERTENSION: ICD-10-CM

## 2023-09-25 RX ORDER — LOSARTAN POTASSIUM AND HYDROCHLOROTHIAZIDE 12.5; 5 MG/1; MG/1
TABLET ORAL
Qty: 135 TABLET | Refills: 0 | Status: SHIPPED | OUTPATIENT
Start: 2023-09-25 | End: 2023-12-26

## 2023-09-28 ENCOUNTER — TRANSFERRED RECORDS (OUTPATIENT)
Dept: HEALTH INFORMATION MANAGEMENT | Facility: CLINIC | Age: 53
End: 2023-09-28
Payer: COMMERCIAL

## 2023-10-16 DIAGNOSIS — E78.5 DYSLIPIDEMIA: ICD-10-CM

## 2023-10-16 RX ORDER — ATORVASTATIN CALCIUM 20 MG/1
20 TABLET, FILM COATED ORAL DAILY
Qty: 90 TABLET | Refills: 0 | Status: SHIPPED | OUTPATIENT
Start: 2023-10-16 | End: 2023-12-26

## 2023-10-16 NOTE — TELEPHONE ENCOUNTER
Appointments in Next Year      Marco Antonio 10, 2024 10:00 AM  (Arrive by 9:40 AM)  Preventative Adult Visit with Tc Greenwood MD  [facility] ([facility] ) [phone]            RT Lynette (R)

## 2023-11-09 ENCOUNTER — TRANSFERRED RECORDS (OUTPATIENT)
Dept: HEALTH INFORMATION MANAGEMENT | Facility: CLINIC | Age: 53
End: 2023-11-09
Payer: COMMERCIAL

## 2023-12-24 DIAGNOSIS — E78.5 DYSLIPIDEMIA: ICD-10-CM

## 2023-12-24 DIAGNOSIS — I10 BENIGN ESSENTIAL HYPERTENSION: ICD-10-CM

## 2023-12-26 RX ORDER — LOSARTAN POTASSIUM AND HYDROCHLOROTHIAZIDE 12.5; 5 MG/1; MG/1
TABLET ORAL
Qty: 135 TABLET | Refills: 0 | Status: SHIPPED | OUTPATIENT
Start: 2023-12-26 | End: 2024-03-26

## 2023-12-26 RX ORDER — ATORVASTATIN CALCIUM 20 MG/1
20 TABLET, FILM COATED ORAL DAILY
Qty: 90 TABLET | Refills: 0 | Status: SHIPPED | OUTPATIENT
Start: 2023-12-26 | End: 2024-03-26

## 2024-01-06 ENCOUNTER — NURSE TRIAGE (OUTPATIENT)
Dept: NURSING | Facility: CLINIC | Age: 54
End: 2024-01-06
Payer: COMMERCIAL

## 2024-01-06 ENCOUNTER — OFFICE VISIT (OUTPATIENT)
Dept: URGENT CARE | Facility: URGENT CARE | Age: 54
End: 2024-01-06
Payer: COMMERCIAL

## 2024-01-06 VITALS
HEART RATE: 96 BPM | TEMPERATURE: 100.2 F | RESPIRATION RATE: 20 BRPM | DIASTOLIC BLOOD PRESSURE: 84 MMHG | OXYGEN SATURATION: 95 % | SYSTOLIC BLOOD PRESSURE: 142 MMHG | WEIGHT: 275 LBS | BODY MASS INDEX: 40.32 KG/M2

## 2024-01-06 DIAGNOSIS — H66.012 ACUTE SUPPURATIVE OTITIS MEDIA OF LEFT EAR WITH SPONTANEOUS RUPTURE OF TYMPANIC MEMBRANE, RECURRENCE NOT SPECIFIED: ICD-10-CM

## 2024-01-06 DIAGNOSIS — J06.9 UPPER RESPIRATORY TRACT INFECTION, UNSPECIFIED TYPE: Primary | ICD-10-CM

## 2024-01-06 PROCEDURE — 99213 OFFICE O/P EST LOW 20 MIN: CPT | Performed by: FAMILY MEDICINE

## 2024-01-06 RX ORDER — AMOXICILLIN 875 MG
875 TABLET ORAL 2 TIMES DAILY
Qty: 20 TABLET | Refills: 0 | Status: SHIPPED | OUTPATIENT
Start: 2024-01-06 | End: 2024-01-16

## 2024-01-06 RX ORDER — INFLIXIMAB 100 MG/10ML
INJECTION, POWDER, LYOPHILIZED, FOR SOLUTION INTRAVENOUS
COMMUNITY
Start: 2022-08-18

## 2024-01-06 RX ORDER — PHENOL 1.4 %
AEROSOL, SPRAY (ML) MUCOUS MEMBRANE
COMMUNITY
Start: 2020-09-30

## 2024-01-06 RX ORDER — MULTIVIT-MIN/IRON/FOLIC ACID/K 18-600-40
CAPSULE ORAL
COMMUNITY
Start: 2020-09-30

## 2024-01-06 RX ORDER — OFLOXACIN 3 MG/ML
5 SOLUTION AURICULAR (OTIC) 2 TIMES DAILY
Qty: 10 ML | Refills: 0 | Status: SHIPPED | OUTPATIENT
Start: 2024-01-06 | End: 2024-01-13

## 2024-01-06 NOTE — TELEPHONE ENCOUNTER
"Call received from spouse, Ana  No Consent to Communicate  Call changed to speaking directly with pt.    Iglesia believes he may have ruptured his Lt ear drum overnight.  - Woke noting drainage from the Lt ear  - Intermittently having yellowish drainage throughout the day  - Has been having a \"bad head cold\" and congestion ~2 weeks with pressure in both ears.  - Immunocompromised - on Remicade for Crohn's dz    Advised to see HCP within 4 hours - [facility]    Karen Marshall RN  [facility]      Reason for Disposition   Diabetes mellitus or weak immune system (e.g., HIV positive, cancer chemo, splenectomy, organ transplant, chronic steroids)    Additional Information   Negative: [1] Bloody or clear ear discharge AND [2] after a head or face injury   Negative: [1] Unexplained bleeding AND [2] lasts > 10 minutes or large amount (Exception: If a few drops of blood, continue with triage.)   Negative: Fever > 103 F (39.4 C)   Negative: Patient sounds very sick or weak to the triager    Protocols used: Ear - [id number]    "

## 2024-01-07 NOTE — PROGRESS NOTES
"SUBJECTIVE: Iglesia Wolff is a 53 year old male presenting with a chief complaint of \"cold symptoms\", cough , and ear pain left.  Onset of symptoms was day(s) ago.  Predisposing factors include None.    Past Medical History:   Diagnosis Date    Crohn's disease of large intestine with fistula (H) 4/21/2021    Gallstones     Hepatic steatosis     HTN (hypertension)     Knee pain     Lumbar radiculopathy     Migraine     Rare, \"stress-related\"     No Known Allergies  Social History     Tobacco Use    Smoking status: Never    Smokeless tobacco: Never   Substance Use Topics    Alcohol use: Yes     Comment: Rare, social use; 1x a week, 3-4 drinks       ROS:  SKIN: no rash  GI: no vomiting    OBJECTIVE:  BP (!) 142/84   Pulse 96   Temp 100.2  F (37.9  C)   Resp 20   Wt 124.7 kg (275 lb)   SpO2 95%   BMI 40.32 kg/m  GENERAL APPEARANCE: healthy, alert and no distress  EYES: EOMI,  PERRL, conjunctiva clear  HENT: TM fluid left, rhinorrhea clear, and oral mucous membranes moist, no erythema noted  RESP: lungs clear to auscultation - no rales, rhonchi or wheezes  SKIN: no suspicious lesions or rashes      ICD-10-CM    1. Upper respiratory tract infection, unspecified type  J06.9       2. Acute suppurative otitis media of left ear with spontaneous rupture of tympanic membrane, recurrence not specified  H66.012 amoxicillin (AMOXIL) 875 MG tablet     ofloxacin (FLOXIN) 0.3 % otic solution          Fluids/Rest, f/u if worse/not any better    "

## 2024-01-09 ASSESSMENT — ENCOUNTER SYMPTOMS
MYALGIAS: 0
NERVOUS/ANXIOUS: 0
SORE THROAT: 0
DIARRHEA: 0
HEADACHES: 0
WEAKNESS: 0
HEARTBURN: 0
PALPITATIONS: 0
NAUSEA: 0
FEVER: 0
SHORTNESS OF BREATH: 0
DIZZINESS: 0
PARESTHESIAS: 0
JOINT SWELLING: 0
CHILLS: 0
CONSTIPATION: 0
FREQUENCY: 0
HEMATOCHEZIA: 0
DYSURIA: 0
COUGH: 1
ARTHRALGIAS: 0
HEMATURIA: 0
ABDOMINAL PAIN: 0
EYE PAIN: 0

## 2024-01-10 ENCOUNTER — OFFICE VISIT (OUTPATIENT)
Dept: FAMILY MEDICINE | Facility: CLINIC | Age: 54
End: 2024-01-10
Payer: COMMERCIAL

## 2024-01-10 VITALS
BODY MASS INDEX: 41.32 KG/M2 | TEMPERATURE: 97.7 F | HEIGHT: 69 IN | RESPIRATION RATE: 16 BRPM | OXYGEN SATURATION: 94 % | SYSTOLIC BLOOD PRESSURE: 108 MMHG | DIASTOLIC BLOOD PRESSURE: 72 MMHG | WEIGHT: 279 LBS | HEART RATE: 90 BPM

## 2024-01-10 DIAGNOSIS — Z12.5 SCREENING FOR PROSTATE CANCER: ICD-10-CM

## 2024-01-10 DIAGNOSIS — E78.5 HYPERLIPIDEMIA LDL GOAL <100: ICD-10-CM

## 2024-01-10 DIAGNOSIS — I10 HYPERTENSION, UNSPECIFIED TYPE: ICD-10-CM

## 2024-01-10 DIAGNOSIS — K50.113 CROHN'S DISEASE OF LARGE INTESTINE WITH FISTULA (H): Chronic | ICD-10-CM

## 2024-01-10 DIAGNOSIS — J06.9 UPPER RESPIRATORY TRACT INFECTION, UNSPECIFIED TYPE: ICD-10-CM

## 2024-01-10 DIAGNOSIS — Z87.898 HISTORY OF PREDIABETES: ICD-10-CM

## 2024-01-10 DIAGNOSIS — E66.01 MORBID OBESITY (H): ICD-10-CM

## 2024-01-10 DIAGNOSIS — Z00.00 ROUTINE HISTORY AND PHYSICAL EXAMINATION OF ADULT: Primary | ICD-10-CM

## 2024-01-10 LAB
ALBUMIN SERPL BCG-MCNC: 3.8 G/DL (ref 3.5–5.2)
ALP SERPL-CCNC: 73 U/L (ref 40–150)
ALT SERPL W P-5'-P-CCNC: 19 U/L (ref 0–70)
ANION GAP SERPL CALCULATED.3IONS-SCNC: 10 MMOL/L (ref 7–15)
AST SERPL W P-5'-P-CCNC: 25 U/L (ref 0–45)
BILIRUB SERPL-MCNC: 0.7 MG/DL
BUN SERPL-MCNC: 15.3 MG/DL (ref 6–20)
CALCIUM SERPL-MCNC: 8.9 MG/DL (ref 8.6–10)
CHLORIDE SERPL-SCNC: 101 MMOL/L (ref 98–107)
CHOLEST SERPL-MCNC: 69 MG/DL
CREAT SERPL-MCNC: 0.87 MG/DL (ref 0.67–1.17)
DEPRECATED HCO3 PLAS-SCNC: 28 MMOL/L (ref 22–29)
EGFRCR SERPLBLD CKD-EPI 2021: >90 ML/MIN/1.73M2
ERYTHROCYTE [DISTWIDTH] IN BLOOD BY AUTOMATED COUNT: 12.1 % (ref 10–15)
FASTING STATUS PATIENT QL REPORTED: NO
GLUCOSE SERPL-MCNC: 112 MG/DL (ref 70–99)
HBA1C MFR BLD: 5.8 % (ref 0–5.6)
HCT VFR BLD AUTO: 47 % (ref 40–53)
HDLC SERPL-MCNC: 27 MG/DL
HGB BLD-MCNC: 15.7 G/DL (ref 13.3–17.7)
LDLC SERPL CALC-MCNC: 26 MG/DL
MCH RBC QN AUTO: 29.3 PG (ref 26.5–33)
MCHC RBC AUTO-ENTMCNC: 33.4 G/DL (ref 31.5–36.5)
MCV RBC AUTO: 88 FL (ref 78–100)
NONHDLC SERPL-MCNC: 42 MG/DL
PLATELET # BLD AUTO: 232 10E3/UL (ref 150–450)
POTASSIUM SERPL-SCNC: 3.5 MMOL/L (ref 3.4–5.3)
PROT SERPL-MCNC: 7.3 G/DL (ref 6.4–8.3)
PSA SERPL DL<=0.01 NG/ML-MCNC: 3.43 NG/ML (ref 0–3.5)
RBC # BLD AUTO: 5.36 10E6/UL (ref 4.4–5.9)
SODIUM SERPL-SCNC: 139 MMOL/L (ref 135–145)
TRIGL SERPL-MCNC: 82 MG/DL
WBC # BLD AUTO: 9.9 10E3/UL (ref 4–11)

## 2024-01-10 PROCEDURE — 99396 PREV VISIT EST AGE 40-64: CPT | Performed by: INTERNAL MEDICINE

## 2024-01-10 PROCEDURE — 83036 HEMOGLOBIN GLYCOSYLATED A1C: CPT | Performed by: INTERNAL MEDICINE

## 2024-01-10 PROCEDURE — 85027 COMPLETE CBC AUTOMATED: CPT | Performed by: INTERNAL MEDICINE

## 2024-01-10 PROCEDURE — 80061 LIPID PANEL: CPT | Performed by: INTERNAL MEDICINE

## 2024-01-10 PROCEDURE — 36415 COLL VENOUS BLD VENIPUNCTURE: CPT | Performed by: INTERNAL MEDICINE

## 2024-01-10 PROCEDURE — 99213 OFFICE O/P EST LOW 20 MIN: CPT | Mod: 25 | Performed by: INTERNAL MEDICINE

## 2024-01-10 PROCEDURE — 80053 COMPREHEN METABOLIC PANEL: CPT | Performed by: INTERNAL MEDICINE

## 2024-01-10 PROCEDURE — 82570 ASSAY OF URINE CREATININE: CPT | Performed by: INTERNAL MEDICINE

## 2024-01-10 PROCEDURE — G0103 PSA SCREENING: HCPCS | Performed by: INTERNAL MEDICINE

## 2024-01-10 PROCEDURE — 82043 UR ALBUMIN QUANTITATIVE: CPT | Performed by: INTERNAL MEDICINE

## 2024-01-10 ASSESSMENT — ENCOUNTER SYMPTOMS
CONSTIPATION: 0
PARESTHESIAS: 0
CHILLS: 0
NAUSEA: 0
FEVER: 0
FREQUENCY: 0
ARTHRALGIAS: 0
SHORTNESS OF BREATH: 0
WEAKNESS: 0
HEADACHES: 0
PALPITATIONS: 0
HEMATURIA: 0
DIARRHEA: 0
COUGH: 1
JOINT SWELLING: 0
HEARTBURN: 0
DYSURIA: 0
NERVOUS/ANXIOUS: 0
ABDOMINAL PAIN: 0
SORE THROAT: 0
EYE PAIN: 0
MYALGIAS: 0
DIZZINESS: 0
HEMATOCHEZIA: 0

## 2024-01-10 ASSESSMENT — PAIN SCALES - GENERAL: PAINLEVEL: NO PAIN (0)

## 2024-01-10 NOTE — PROGRESS NOTES
SUBJECTIVE:   Ada is a 53 year old, presenting for the following:  Physical         No data to display                Healthy Habits:     Getting at least 3 servings of Calcium per day:  Yes    Bi-annual eye exam:  NO    Dental care twice a year:  NO    Sleep apnea or symptoms of sleep apnea:  None    Diet:  Regular (no restrictions)    Frequency of exercise:  2-3 days/week    Duration of exercise:  15-30 minutes    Taking medications regularly:  Yes    Barriers to taking medications:  None    Medication side effects:  None    Additional concerns today:  No      SNORES ONLY IF HE DRINKS    URI WITH RUPTURED EAR DRUM, GETTING BETTER    GB removed    No palpitations, no se from BP meds    Wt up a little, eating fast food to eating on way to work..      Today's PHQ-2 Score:       1/9/2024    11:40 AM   PHQ-2 ( 1999 [employer])   Q1: Little interest or pleasure in doing things 0   Q2: Feeling down, depressed or hopeless 0   PHQ-2 Score 0   Q1: Little interest or pleasure in doing things Not at all   Q2: Feeling down, depressed or hopeless Not at all   PHQ-2 Score 0             Social History     Tobacco Use    Smoking status: Never    Smokeless tobacco: Never   Substance Use Topics    Alcohol use: Yes     Comment: Rare, social use; 1x a week, 3-4 drinks             1/9/2024    11:40 AM   Alcohol Use   Prescreen: >3 drinks/day or >7 drinks/week? No          No data to display                Last PSA:   PSA   Date Value Ref Range Status   04/21/2021 1.64 0 - 4 ug/L Final     Comment:     Assay Method:  Chemiluminescence using Siemens Vista analyzer     Prostate Specific Antigen Screen   Date Value Ref Range Status   06/01/2022 2.13 0.00 - 4.00 ug/L Final       Reviewed orders with patient. Reviewed health maintenance and updated orders accordingly - Yes  Lab work is in process  Labs reviewed in EPIC  BP Readings from Last 3 Encounters:   01/10/24 108/72   01/06/24 (!) 142/84   06/01/22 122/77    Wt Readings from Last 3  Encounters:   01/10/24 126.6 kg (279 lb)   01/06/24 124.7 kg (275 lb)   06/01/22 117.9 kg (260 lb)                  Patient Active Problem List   Diagnosis    CARDIOVASCULAR SCREENING; LDL GOAL LESS THAN 160    Morbid obesity (H)    Gallstones    Crohn's disease of large intestine with fistula (H)     Past Surgical History:   Procedure Laterality Date    COLONOSCOPY N/A 9/16/2020    Procedure: 1.  Intraoperative colonoscopy with polypectomy x 2 and random biopsies of the colon for colitis.    2.  Exam under anesthesia, excision of papilla, superficial fistulotomy and biopsy of lesion.;  Surgeon: Tessa Alfaro MD;  Location:  OR    [facility] LAPAROSCOPIC CHOLECYSTECTOMY WITHOUT GRAMS N/A 6/5/2020    Procedure: CHOLECYSTECTOMY, ROBOT-ASSISTED, LAPAROSCOPIC;  Surgeon: Merrick Moncada MD;  Location: [facility] OR    DENTAL SURGERY  1987    wisdom teeth    EXAM UNDER ANESTHESIA, FISTULOTOMY RECTUM, COMBINED N/A 9/16/2020    Procedure: Exam under anesthesia, fistulotomy rectum, combined;  Surgeon: Tessa Alfaro MD;  Location:  OR    HERNIA REPAIR, INGUINAL RT/LT Bilateral 1979    LAPAROSCOPIC BIOPSY LIVER N/A 6/5/2020    Procedure: Laparoscopic Biopsy Liver;  Surgeon: Merrick Moncada MD;  Location: [facility] OR       Social History     Tobacco Use    Smoking status: Never    Smokeless tobacco: Never   Substance Use Topics    Alcohol use: Yes     Comment: Rare, social use; 1x a week, 3-4 drinks     Family History   Problem Relation Age of Onset    Family History Negative Father     Family History Negative Mother     Coronary Stenting Brother     Anesthesia Reaction No family hx of     Deep Vein Thrombosis (DVT) No family hx of          Current Outpatient Medications   Medication Sig Dispense Refill    amoxicillin (AMOXIL) 875 MG tablet Take 1 tablet (875 mg) by mouth 2 times daily for 10 days 20 tablet 0    atorvastatin (LIPITOR) 20 MG tablet TAKE 1 TABLET DAILY 90 tablet 0    calcium carbonate 600 MG tablet  "      Cholecalciferol (VITAMIN D) 50 MCG (2000 UT) CAPS       inFLIXimab (REMICADE) 100 MG injection Administer Remicade 5mg/kg every eight weeks, infuse by IV route      losartan-hydrochlorothiazide (HYZAAR) 50-12.5 MG tablet TAKE 1 AND 1/2 TABLETS     DAILY 135 tablet 0    ofloxacin (FLOXIN) 0.3 % otic solution Place 5 drops Into the left ear 2 times daily for 7 days 10 mL 0     No Known Allergies  Recent Labs   Lab Test 01/10/24  1044 06/01/22  1042 04/21/21  1037 10/02/20  0000 09/10/20  0820 06/05/20  0805 05/14/20  0958   A1C 5.8* 5.3  --   --  5.3  --   --    LDL  --  46 99  --   --   --  88   HDL  --  53 53  --   --   --  39*   TRIG  --  92 82  --   --   --  104   ALT  --  30 27 44  --   --  72*   CR  --  0.85 0.83 0.74 0.82   < > 0.93   GFRESTIMATED  --  >90 >90  --  >90   < > >90   GFRESTBLACK  --   --  >90  --  >90   < > >90   POTASSIUM  --  4.1 3.8 3.6 3.6   < > 4.0   TSH  --  1.52  --   --   --   --   --     < > = values in this interval not displayed.        Reviewed and updated as needed this visit by clinical staff     Meds  Problems             Reviewed and updated as needed this visit by Provider     Meds  Problems            Past Medical History:   Diagnosis Date    Crohn's disease of large intestine with fistula (H) 4/21/2021    Gallstones     Hepatic steatosis     HTN (hypertension)     Knee pain     Lumbar radiculopathy     Migraine     Rare, \"stress-related\"      Past Surgical History:   Procedure Laterality Date    COLONOSCOPY N/A 9/16/2020    Procedure: 1.  Intraoperative colonoscopy with polypectomy x 2 and random biopsies of the colon for colitis.    2.  Exam under anesthesia, excision of papilla, superficial fistulotomy and biopsy of lesion.;  Surgeon: Tessa Alfaro MD;  Location:  OR    [facility] LAPAROSCOPIC CHOLECYSTECTOMY WITHOUT GRAMS N/A 6/5/2020    Procedure: CHOLECYSTECTOMY, ROBOT-ASSISTED, LAPAROSCOPIC;  Surgeon: Merrick Moncada MD;  Location: [facility] OR    DENTAL " SURGERY  1987    wisdom teeth    EXAM UNDER ANESTHESIA, FISTULOTOMY RECTUM, COMBINED N/A 9/16/2020    Procedure: Exam under anesthesia, fistulotomy rectum, combined;  Surgeon: Tessa Alfaro MD;  Location: [facility] OR    HERNIA REPAIR, INGUINAL RT/LT Bilateral 1979    LAPAROSCOPIC BIOPSY LIVER N/A 6/5/2020    Procedure: Laparoscopic Biopsy Liver;  Surgeon: Merrick Moncada MD;  Location: [facility] OR       Review of Systems   Constitutional:  Negative for chills and fever.   HENT:  Positive for congestion. Negative for ear pain, hearing loss and sore throat.    Eyes:  Negative for pain and visual disturbance.   Respiratory:  Positive for cough. Negative for shortness of breath.    Cardiovascular:  Negative for chest pain, palpitations and peripheral edema.   Gastrointestinal:  Negative for abdominal pain, constipation, diarrhea, heartburn, hematochezia and nausea.   Genitourinary:  Negative for dysuria, frequency, genital sores, hematuria, impotence, penile discharge and urgency.   Musculoskeletal:  Negative for arthralgias, joint swelling and myalgias.   Skin:  Negative for rash.   Neurological:  Negative for dizziness, weakness, headaches and paresthesias.   Psychiatric/Behavioral:  Negative for mood changes. The patient is not nervous/anxious.      CONSTITUTIONAL: NEGATIVE for fever, chills, change in weight  INTEGUMENTARY/SKIN: NEGATIVE for worrisome rashes, moles or lesions  EYES: NEGATIVE for vision changes or irritation  ENT: NEGATIVE for ear, mouth and throat problems  RESP: NEGATIVE for significant cough or SOB  CV: NEGATIVE for chest pain, palpitations or peripheral edema  GI: NEGATIVE for nausea, abdominal pain, heartburn, or change in bowel habits   male: negative for dysuria, hematuria, decreased urinary stream, erectile dysfunction, urethral discharge  MUSCULOSKELETAL: NEGATIVE for significant arthralgias or myalgia  NEURO: NEGATIVE for weakness, dizziness or paresthesias  PSYCHIATRIC: NEGATIVE for  "changes in mood or affect    OBJECTIVE:   /72   Pulse 90   Temp 97.7  F (36.5  C) (Temporal)   Resp 16   Ht 1.759 m (5' 9.25\")   Wt 126.6 kg (279 lb)   SpO2 94%   BMI 40.90 kg/m      Physical Exam  GENERAL: healthy, alert and no distress  GENERAL: obese  EYES: Eyes grossly normal to inspection, PERRL and conjunctivae and sclerae normal  HENT: ear canals and TM's normal, nose and mouth without ulcers or lesions  NECK: no adenopathy, no asymmetry, masses, or scars and thyroid normal to palpation  RESP: lungs clear to auscultation - no rales, rhonchi or wheezes  CV: regular rate and rhythm, normal S1 S2, no S3 or S4, no murmur, click or rub, no peripheral edema and peripheral pulses strong  ABDOMEN: soft, nontender, no hepatosplenomegaly, no masses and bowel sounds normal  MS: no gross musculoskeletal defects noted, no edema  SKIN: no suspicious lesions or rashes  NEURO: Normal strength and tone, mentation intact and speech normal  PSYCH: mentation appears normal, affect normal/bright    Diagnostic Test Results:  Labs reviewed in Epic    ASSESSMENT/PLAN:   Pat was seen today for physical.    Diagnoses and all orders for this visit:    Routine history and physical examination of adult    Upper respiratory tract infection, unspecified type    Crohn's disease of large intestine with fistula (H)  -     CBC with platelets  -     Comprehensive metabolic panel (BMP + Alb, Alk Phos, ALT, AST, Total. Bili, TP)    Morbid obesity (H)  -     Med Therapy Management Referral  -     Adult Comprehensive Weight Management  Referral; Future    Hypertension, unspecified type  -     CBC with platelets  -     Comprehensive metabolic panel (BMP + Alb, Alk Phos, ALT, AST, Total. Bili, TP)  -     Albumin Random Urine Quantitative with Creat Ratio    Hyperlipidemia LDL goal <100  -     Lipid panel reflex to direct LDL Fasting    History of prediabetes  -     Hemoglobin A1c    Screening for prostate cancer  -     PSA, " screen    Other orders  -     REVIEW OF HEALTH MAINTENANCE PROTOCOL ORDERS      Stressed importance of follow-up weight management consider GLP-1 agonist will discuss with MTM referral to weight management program.  Denies sleep apnea.  Continue encourage lifestyle changes exercise and weight loss.  His URI symptoms nearly resolved had otitis completing course of amoxicillin.  Is on Remicade follows with GI.  He had some possible steatosis and it is important to lose weight.  No other systemic complaints.  All questions answered.  Patient has been advised of split billing requirements and indicates understanding: Yes      COUNSELING:   Reviewed preventive health counseling, as reflected in patient instructions       Regular exercise       Healthy diet/nutrition       Vision screening       Hearing screening       Pneumococcal Vaccine          Alcohol Use        Consider Hep C screening for all patients one time for ages 18-79 years       HIV screeninx in teen years, 1x in adult years, and at intervals if high risk       Colorectal cancer screening       Prostate cancer screening       Advance Care Planning        He reports that he has never smoked. He has never used smokeless tobacco.            Tc Greenwood MD  [facility]

## 2024-01-11 ENCOUNTER — TRANSFERRED RECORDS (OUTPATIENT)
Dept: HEALTH INFORMATION MANAGEMENT | Facility: CLINIC | Age: 54
End: 2024-01-11
Payer: COMMERCIAL

## 2024-01-11 LAB
ALT SERPL-CCNC: 22 IU/L (ref 0–44)
AST SERPL-CCNC: 25 IU/L (ref 0–40)

## 2024-01-15 ENCOUNTER — TELEPHONE (OUTPATIENT)
Dept: FAMILY MEDICINE | Facility: CLINIC | Age: 54
End: 2024-01-15

## 2024-01-15 ENCOUNTER — APPOINTMENT (OUTPATIENT)
Dept: LAB | Facility: CLINIC | Age: 54
End: 2024-01-15
Payer: COMMERCIAL

## 2024-01-15 LAB
CREAT UR-MCNC: 84.4 MG/DL
MICROALBUMIN UR-MCNC: <12 MG/L
MICROALBUMIN/CREAT UR: NORMAL MG/G{CREAT}

## 2024-01-15 NOTE — TELEPHONE ENCOUNTER
MT referral from: [facility] visit (referral by provider)    MT referral outreach attempt #2 on January 15, 2024 at 12:40 PM      Outcome: Patient not reachable after several attempts, will route to [facility] Pharmacist/Provider as an FYI.  [facility] scheduling number is .  Thank you for the referral.    Use bcbs oos gfe needed for the carrier/Plan on the flowsheet      [employer] Message Sent    Pham Trujillo  [facility]

## 2024-02-01 ENCOUNTER — TRANSFERRED RECORDS (OUTPATIENT)
Dept: HEALTH INFORMATION MANAGEMENT | Facility: CLINIC | Age: 54
End: 2024-02-01
Payer: COMMERCIAL

## 2024-03-26 DIAGNOSIS — E78.5 DYSLIPIDEMIA: ICD-10-CM

## 2024-03-26 DIAGNOSIS — I10 BENIGN ESSENTIAL HYPERTENSION: ICD-10-CM

## 2024-03-26 RX ORDER — LOSARTAN POTASSIUM AND HYDROCHLOROTHIAZIDE 12.5; 5 MG/1; MG/1
TABLET ORAL
Qty: 135 TABLET | Refills: 2 | Status: SHIPPED | OUTPATIENT
Start: 2024-03-26

## 2024-03-26 RX ORDER — ATORVASTATIN CALCIUM 20 MG/1
20 TABLET, FILM COATED ORAL DAILY
Qty: 90 TABLET | Refills: 2 | Status: SHIPPED | OUTPATIENT
Start: 2024-03-26

## 2024-05-02 ENCOUNTER — TRANSFERRED RECORDS (OUTPATIENT)
Dept: HEALTH INFORMATION MANAGEMENT | Facility: CLINIC | Age: 54
End: 2024-05-02
Payer: COMMERCIAL

## 2024-05-29 ENCOUNTER — E-VISIT (OUTPATIENT)
Dept: FAMILY MEDICINE | Facility: CLINIC | Age: 54
End: 2024-05-29
Payer: COMMERCIAL

## 2024-05-29 DIAGNOSIS — M54.42 ACUTE LEFT-SIDED LOW BACK PAIN WITH LEFT-SIDED SCIATICA: Primary | ICD-10-CM

## 2024-05-29 PROCEDURE — 99422 OL DIG E/M SVC 11-20 MIN: CPT | Performed by: INTERNAL MEDICINE

## 2024-05-29 RX ORDER — PREDNISONE 20 MG/1
20 TABLET ORAL 2 TIMES DAILY
Qty: 10 TABLET | Refills: 0 | Status: SHIPPED | OUTPATIENT
Start: 2024-05-29

## 2024-05-29 NOTE — PATIENT INSTRUCTIONS
Thank you for choosing us for your care. I have placed an order for a prescription so that you can start treatment. View your full visit summary for details by clicking on the link below. Your pharmacist will able to address any questions you may have about the medication.      If you're not feeling better within 2-3 weeks please schedule an appointment.  You can schedule an appointment right here in [place], or call [phone]  If the visit is for the same symptoms as your eVisit, we'll refund the cost of your eVisit if seen within seven days.    Thank you for choosing us for your care. I think an in-clinic or virtual visit would be the best next step based on your symptoms. Please schedule a clinic appointment; you won t be charged for this eVisit.      You can schedule an appointment by clicking here in [place], or call [phone].

## 2024-06-27 ENCOUNTER — TRANSFERRED RECORDS (OUTPATIENT)
Dept: HEALTH INFORMATION MANAGEMENT | Facility: CLINIC | Age: 54
End: 2024-06-27
Payer: COMMERCIAL

## 2024-06-27 LAB
ALT SERPL-CCNC: 22 IU/L (ref 0–44)
AST SERPL-CCNC: 27 IU/L (ref 0–40)

## 2024-08-22 ENCOUNTER — TRANSFERRED RECORDS (OUTPATIENT)
Dept: HEALTH INFORMATION MANAGEMENT | Facility: CLINIC | Age: 54
End: 2024-08-22
Payer: COMMERCIAL

## 2024-11-06 ENCOUNTER — TRANSFERRED RECORDS (OUTPATIENT)
Dept: HEALTH INFORMATION MANAGEMENT | Facility: CLINIC | Age: 54
End: 2024-11-06
Payer: COMMERCIAL

## 2024-12-11 DIAGNOSIS — I10 BENIGN ESSENTIAL HYPERTENSION: ICD-10-CM

## 2024-12-11 RX ORDER — LOSARTAN POTASSIUM AND HYDROCHLOROTHIAZIDE 12.5; 5 MG/1; MG/1
TABLET ORAL
Qty: 135 TABLET | Refills: 0 | Status: SHIPPED | OUTPATIENT
Start: 2024-12-11

## 2024-12-12 ENCOUNTER — TRANSFERRED RECORDS (OUTPATIENT)
Dept: HEALTH INFORMATION MANAGEMENT | Facility: CLINIC | Age: 54
End: 2024-12-12
Payer: COMMERCIAL

## 2024-12-18 ENCOUNTER — TRANSFERRED RECORDS (OUTPATIENT)
Dept: HEALTH INFORMATION MANAGEMENT | Facility: CLINIC | Age: 54
End: 2024-12-18
Payer: COMMERCIAL

## 2024-12-26 DIAGNOSIS — E78.5 DYSLIPIDEMIA: ICD-10-CM

## 2024-12-26 RX ORDER — ATORVASTATIN CALCIUM 20 MG/1
20 TABLET, FILM COATED ORAL DAILY
Qty: 90 TABLET | Refills: 0 | Status: SHIPPED | OUTPATIENT
Start: 2024-12-26

## 2025-01-10 ENCOUNTER — TRANSFERRED RECORDS (OUTPATIENT)
Dept: HEALTH INFORMATION MANAGEMENT | Facility: CLINIC | Age: 55
End: 2025-01-10
Payer: COMMERCIAL

## 2025-01-15 ENCOUNTER — OFFICE VISIT (OUTPATIENT)
Dept: FAMILY MEDICINE | Facility: CLINIC | Age: 55
End: 2025-01-15
Payer: COMMERCIAL

## 2025-01-15 VITALS
HEART RATE: 70 BPM | RESPIRATION RATE: 18 BRPM | BODY MASS INDEX: 43.38 KG/M2 | DIASTOLIC BLOOD PRESSURE: 73 MMHG | HEIGHT: 69 IN | SYSTOLIC BLOOD PRESSURE: 112 MMHG | OXYGEN SATURATION: 95 % | TEMPERATURE: 97 F | WEIGHT: 292.9 LBS

## 2025-01-15 DIAGNOSIS — M25.562 CHRONIC PAIN OF LEFT KNEE: ICD-10-CM

## 2025-01-15 DIAGNOSIS — I10 BENIGN ESSENTIAL HYPERTENSION: ICD-10-CM

## 2025-01-15 DIAGNOSIS — E78.5 DYSLIPIDEMIA: ICD-10-CM

## 2025-01-15 DIAGNOSIS — K50.113 CROHN'S DISEASE OF LARGE INTESTINE WITH FISTULA (H): Chronic | ICD-10-CM

## 2025-01-15 DIAGNOSIS — M17.12 PRIMARY OSTEOARTHRITIS OF LEFT KNEE: ICD-10-CM

## 2025-01-15 DIAGNOSIS — Z12.5 SCREENING FOR PROSTATE CANCER: ICD-10-CM

## 2025-01-15 DIAGNOSIS — E66.813 CLASS 3 SEVERE OBESITY WITH SERIOUS COMORBIDITY AND BODY MASS INDEX (BMI) OF 40.0 TO 44.9 IN ADULT, UNSPECIFIED OBESITY TYPE (H): ICD-10-CM

## 2025-01-15 DIAGNOSIS — G89.29 CHRONIC PAIN OF LEFT KNEE: ICD-10-CM

## 2025-01-15 DIAGNOSIS — Z00.00 ROUTINE HISTORY AND PHYSICAL EXAMINATION OF ADULT: Primary | ICD-10-CM

## 2025-01-15 DIAGNOSIS — R06.83 SNORING: ICD-10-CM

## 2025-01-15 DIAGNOSIS — Z13.1 SCREENING FOR DIABETES MELLITUS: ICD-10-CM

## 2025-01-15 DIAGNOSIS — E66.01 CLASS 3 SEVERE OBESITY WITH SERIOUS COMORBIDITY AND BODY MASS INDEX (BMI) OF 40.0 TO 44.9 IN ADULT, UNSPECIFIED OBESITY TYPE (H): ICD-10-CM

## 2025-01-15 PROBLEM — K80.20 GALLSTONES: Status: RESOLVED | Noted: 2020-05-20 | Resolved: 2025-01-15

## 2025-01-15 LAB
ALBUMIN SERPL BCG-MCNC: 4.1 G/DL (ref 3.5–5.2)
ALP SERPL-CCNC: 91 U/L (ref 40–150)
ALT SERPL W P-5'-P-CCNC: 30 U/L (ref 0–70)
ANION GAP SERPL CALCULATED.3IONS-SCNC: 10 MMOL/L (ref 7–15)
AST SERPL W P-5'-P-CCNC: 35 U/L (ref 0–45)
BILIRUB SERPL-MCNC: 1.3 MG/DL
BUN SERPL-MCNC: 13.5 MG/DL (ref 6–20)
CALCIUM SERPL-MCNC: 9.9 MG/DL (ref 8.8–10.4)
CHLORIDE SERPL-SCNC: 104 MMOL/L (ref 98–107)
CHOLEST SERPL-MCNC: 119 MG/DL
CREAT SERPL-MCNC: 0.89 MG/DL (ref 0.67–1.17)
CREAT UR-MCNC: 48.6 MG/DL
EGFRCR SERPLBLD CKD-EPI 2021: >90 ML/MIN/1.73M2
ERYTHROCYTE [DISTWIDTH] IN BLOOD BY AUTOMATED COUNT: 12.6 % (ref 10–15)
EST. AVERAGE GLUCOSE BLD GHB EST-MCNC: 128 MG/DL
FASTING STATUS PATIENT QL REPORTED: YES
FASTING STATUS PATIENT QL REPORTED: YES
GLUCOSE SERPL-MCNC: 105 MG/DL (ref 70–99)
HBA1C MFR BLD: 6.1 % (ref 0–5.6)
HCO3 SERPL-SCNC: 27 MMOL/L (ref 22–29)
HCT VFR BLD AUTO: 45.8 % (ref 40–53)
HDLC SERPL-MCNC: 47 MG/DL
HGB BLD-MCNC: 15.8 G/DL (ref 13.3–17.7)
LDLC SERPL CALC-MCNC: 40 MG/DL
MCH RBC QN AUTO: 29.8 PG (ref 26.5–33)
MCHC RBC AUTO-ENTMCNC: 34.5 G/DL (ref 31.5–36.5)
MCV RBC AUTO: 86 FL (ref 78–100)
MICROALBUMIN UR-MCNC: <12 MG/L
MICROALBUMIN/CREAT UR: NORMAL MG/G{CREAT}
NONHDLC SERPL-MCNC: 72 MG/DL
PLATELET # BLD AUTO: 233 10E3/UL (ref 150–450)
POTASSIUM SERPL-SCNC: 4.2 MMOL/L (ref 3.4–5.3)
PROT SERPL-MCNC: 7.3 G/DL (ref 6.4–8.3)
PSA SERPL DL<=0.01 NG/ML-MCNC: 1.99 NG/ML (ref 0–3.5)
RBC # BLD AUTO: 5.3 10E6/UL (ref 4.4–5.9)
SODIUM SERPL-SCNC: 141 MMOL/L (ref 135–145)
TRIGL SERPL-MCNC: 159 MG/DL
WBC # BLD AUTO: 9.6 10E3/UL (ref 4–11)

## 2025-01-15 PROCEDURE — 80061 LIPID PANEL: CPT | Performed by: INTERNAL MEDICINE

## 2025-01-15 PROCEDURE — 82570 ASSAY OF URINE CREATININE: CPT | Performed by: INTERNAL MEDICINE

## 2025-01-15 PROCEDURE — 99214 OFFICE O/P EST MOD 30 MIN: CPT | Mod: 25 | Performed by: INTERNAL MEDICINE

## 2025-01-15 PROCEDURE — G0103 PSA SCREENING: HCPCS | Performed by: INTERNAL MEDICINE

## 2025-01-15 PROCEDURE — 80053 COMPREHEN METABOLIC PANEL: CPT | Performed by: INTERNAL MEDICINE

## 2025-01-15 PROCEDURE — 85027 COMPLETE CBC AUTOMATED: CPT | Performed by: INTERNAL MEDICINE

## 2025-01-15 PROCEDURE — 36415 COLL VENOUS BLD VENIPUNCTURE: CPT | Performed by: INTERNAL MEDICINE

## 2025-01-15 PROCEDURE — 99396 PREV VISIT EST AGE 40-64: CPT | Performed by: INTERNAL MEDICINE

## 2025-01-15 PROCEDURE — 82043 UR ALBUMIN QUANTITATIVE: CPT | Performed by: INTERNAL MEDICINE

## 2025-01-15 PROCEDURE — 83036 HEMOGLOBIN GLYCOSYLATED A1C: CPT | Performed by: INTERNAL MEDICINE

## 2025-01-15 SDOH — HEALTH STABILITY: PHYSICAL HEALTH: ON AVERAGE, HOW MANY DAYS PER WEEK DO YOU ENGAGE IN MODERATE TO STRENUOUS EXERCISE (LIKE A BRISK WALK)?: 2 DAYS

## 2025-01-15 SDOH — HEALTH STABILITY: PHYSICAL HEALTH: ON AVERAGE, HOW MANY MINUTES DO YOU ENGAGE IN EXERCISE AT THIS LEVEL?: 20 MIN

## 2025-01-15 ASSESSMENT — SOCIAL DETERMINANTS OF HEALTH (SDOH): HOW OFTEN DO YOU GET TOGETHER WITH FRIENDS OR RELATIVES?: ONCE A WEEK

## 2025-01-15 ASSESSMENT — PAIN SCALES - GENERAL: PAINLEVEL_OUTOF10: NO PAIN (0)

## 2025-01-15 NOTE — PROGRESS NOTES
Preventive Care Visit  [facility]  Tc Greenwood MD, Internal Medicine  Marco Antonio 15, 2025      Assessment & Plan   Problem List Items Addressed This Visit       Crohn's disease of large intestine with fistula (H) (Chronic)     Other Visit Diagnoses       Routine history and physical examination of adult    -  Primary    Benign essential hypertension        Relevant Orders    Comprehensive metabolic panel (BMP + Alb, Alk Phos, ALT, AST, Total. Bili, TP)    CBC with platelets    Albumin Random Urine Quantitative with Creat Ratio    Screening for prostate cancer        Relevant Orders    PSA, screen    Snoring        Relevant Orders    Adult Sleep Eval & Management  Referral    Dyslipidemia        Relevant Orders    Lipid panel reflex to direct LDL Non-fasting    Primary osteoarthritis of left knee        Chronic pain of left knee        Class 3 severe obesity with serious comorbidity and body mass index (BMI) of 40.0 to 44.9 in adult, unspecified obesity type (H)        Screening for diabetes mellitus        Relevant Orders    Hemoglobin A1c           No family history of cancer.  Colonoscopy up-to-date,  Patient deferred COVID-vaccine otherwise is updated all vaccines.  Discussed about weight management exercise and healthy diet decrease portions of meals.  Patient not interested in GLP-1 agonist injections at this point.  He does have chronic knee pain follows with also consideration for total knee replacement in a year or so.  We discussed jeff chi exercises to do.  Blood pressure seems reasonably controlled; 127/7475 range.  He is on 1-1/2 tablet of losartan/hydrochlorothiazide daily.  Has some history of snoring referral to sleep medicine may benefit from doing a sleep study.  He does meet the high risk criteria 6 out of 8 in the bang STOP-BANG questionnaire for sleep apnea.  Continue follow-up with GI on Remicade every 8 weeks gets TB screen yearly negative and gets skin check for skin  "cancer screening yearly with dermatology.  Cut down on salt low carbs diet low saturated fat, brisk walking 30 minutes 5 times a week if tolerated keep alcohol use to minimal.  All questions answered  Up-to-date on flu vaccine.  Patient deferred declined COVID-vaccine.    The ASCVD Risk score (Mark MARTINEZ, et al., 2019) failed to calculate for the following reasons:    The valid total cholesterol range is 130 to 320 mg/dL      Patient has been advised of split billing requirements and indicates understanding: Yes       BMI  Estimated body mass index is 42.94 kg/m  as calculated from the following:    Height as of this encounter: 1.759 m (5' 9.25\").    Weight as of this encounter: 132.9 kg (292 lb 14.4 oz).   Weight management plan: Discussed healthy diet and exercise guidelines    Counseling  Appropriate preventive services were addressed with this patient via screening, questionnaire, or discussion as appropriate for fall prevention, nutrition, physical activity, Tobacco-use cessation, social engagement, weight loss and cognition.  Checklist reviewing preventive services available has been given to the patient.  Reviewed patient's diet, addressing concerns and/or questions.   He is at risk for lack of exercise and has been provided with information to increase physical activity for the benefit of his well-being.   The patient was instructed to see the dentist every 6 months.     Work on weight loss  Regular exercise  See Patient Instructions      Subjective   Ada is a 54 year old, presenting for the following:  Physical        1/15/2025     9:44 AM   Additional Questions   Roomed by tori GONZALES    Ada presented for his yearly wellness.  He is doing well.  He is comfortable has 6 problems his left knee was pain in his thinks he will need a total knee replacement end of the year.  He is doing some walking couple times during the week.  First injection left knee did help second help much.  He is hesitant on " getting the GLP-1 agonist injections due to concern of side effects.      Sees GI yearly.  Follows with GI yearly for Crohn's disease, on Remicade injections every 8 weeks symptoms are stable no blood in the stools or diarrhea.    Hide history of cholecystectomy in 2020.    Rarely snores at night only as she does all contact; alcohol rarely 1 weekend in summer 1 night at a time.                Health Care Directive  Patient does not have a Health Care Directive: Discussed advance care planning with patient; however, patient declined at this time.      1/15/2025   General Health   How would you rate your overall physical health? Good   Feel stress (tense, anxious, or unable to sleep) Not at all         1/15/2025   Nutrition   Three or more servings of calcium each day? Yes   Diet: Regular (no restrictions)   How many servings of fruit and vegetables per day? (!) 0-1   How many sweetened beverages each day? 0-1         1/15/2025   Exercise   Days per week of moderate/strenous exercise 2 days   Average minutes spent exercising at this level 20 min   (!) EXERCISE CONCERN      1/15/2025   Social Factors   Frequency of gathering with friends or relatives Once a week   Worry food won't last until get money to buy more No    No   Food not last or not have enough money for food? No    No   Do you have housing? (Housing is defined as stable permanent housing and does not include staying ouside in a car, in a tent, in an abandoned building, in an overnight shelter, or couch-surfing.) Yes    Yes   Are you worried about losing your housing? No    No   Lack of transportation? No    No   Unable to get utilities (heat,electricity)? No    No       Multiple values from one day are sorted in reverse-chronological order         1/15/2025   Fall Risk   Fallen 2 or more times in the past year? No   Trouble with walking or balance? No          1/15/2025   Dental   Dentist two times every year? (!) NO         1/15/2025   TB Screening  "  Were you born outside of the [place]? No         Today's PHQ-2 Score:       1/15/2025     7:02 AM   PHQ-2 ( 1999 [employer])   Q1: Little interest or pleasure in doing things 0   Q2: Feeling down, depressed or hopeless 0   PHQ-2 Score 0    Q1: Little interest or pleasure in doing things Not at all   Q2: Feeling down, depressed or hopeless Not at all   PHQ-2 Score 0       Patient-reported           1/15/2025   Substance Use   Alcohol more than 3/day or more than 7/wk No   Do you use any other substances recreationally? No     Social History     Tobacco Use    Smoking status: Never    Smokeless tobacco: Never   Vaping Use    Vaping status: Never Used   Substance Use Topics    Alcohol use: Yes     Comment: Very casual    Drug use: Never           1/15/2025   STI Screening   New sexual partner(s) since last STI/HIV test? No   Last PSA:   PSA   Date Value Ref Range Status   04/21/2021 1.64 0 - 4 ug/L Final     Comment:     Assay Method:  Chemiluminescence using Siemens Vista analyzer     Prostate Specific Antigen Screen   Date Value Ref Range Status   01/10/2024 3.43 0.00 - 3.50 ng/mL Final   06/01/2022 2.13 0.00 - 4.00 ug/L Final     ASCVD Risk   The ASCVD Risk score (Mark DK, et al., 2019) failed to calculate for the following reasons:    The valid total cholesterol range is 130 to 320 mg/dL           Reviewed and updated as needed this visit by Provider      Problems               Past Medical History:   Diagnosis Date    Crohn's disease of large intestine with fistula (H) 04/21/2021    Gallstones     Gallstones 05/20/2020    Added automatically from request for surgery [id number]      Hepatic steatosis     HTN (hypertension)     Knee pain     Lumbar radiculopathy     Migraine     Rare, \"stress-related\"     Past Surgical History:   Procedure Laterality Date    BIOPSY  6/5/20    liver sample as part of gallbladder removal    CHOLECYSTECTOMY  6/5/20    COLONOSCOPY N/A 09/16/2020    Procedure: 1.  Intraoperative " colonoscopy with polypectomy x 2 and random biopsies of the colon for colitis.    2.  Exam under anesthesia, excision of papilla, superficial fistulotomy and biopsy of lesion.;  Surgeon: Tessa Alfaro MD;  Location: [facility] OR    DAVINCI LAPAROSCOPIC CHOLECYSTECTOMY WITHOUT GRAMS N/A 06/05/2020    Procedure: CHOLECYSTECTOMY, ROBOT-ASSISTED, LAPAROSCOPIC;  Surgeon: Merrick Moncada MD;  Location: [facility] OR    DENTAL SURGERY  1987    wisdom teeth    EXAM UNDER ANESTHESIA, FISTULOTOMY RECTUM, COMBINED N/A 09/16/2020    Procedure: Exam under anesthesia, fistulotomy rectum, combined;  Surgeon: Tessa Alfaro MD;  Location: [facility] OR    HERNIA REPAIR, INGUINAL RT/LT Bilateral 1979    LAPAROSCOPIC BIOPSY LIVER N/A 06/05/2020    Procedure: Laparoscopic Biopsy Liver;  Surgeon: Merrick Moncada MD;  Location:  OR     Lab work is in process  Labs reviewed in EPIC  BP Readings from Last 3 Encounters:   01/15/25 127/83   01/10/24 108/72   01/06/24 (!) 142/84    Wt Readings from Last 3 Encounters:   01/15/25 132.9 kg (292 lb 14.4 oz)   01/10/24 126.6 kg (279 lb)   01/06/24 124.7 kg (275 lb)                  Patient Active Problem List   Diagnosis    CARDIOVASCULAR SCREENING; LDL GOAL LESS THAN 160    Morbid obesity (H)    Crohn's disease of large intestine with fistula (H)     Past Surgical History:   Procedure Laterality Date    BIOPSY  6/5/20    liver sample as part of gallbladder removal    CHOLECYSTECTOMY  6/5/20    COLONOSCOPY N/A 09/16/2020    Procedure: 1.  Intraoperative colonoscopy with polypectomy x 2 and random biopsies of the colon for colitis.    2.  Exam under anesthesia, excision of papilla, superficial fistulotomy and biopsy of lesion.;  Surgeon: Tessa Alfaro MD;  Location: [facility] OR    DAVINCI LAPAROSCOPIC CHOLECYSTECTOMY WITHOUT GRAMS N/A 06/05/2020    Procedure: CHOLECYSTECTOMY, ROBOT-ASSISTED, LAPAROSCOPIC;  Surgeon: Merrick Moncada MD;  Location: [facility] OR    DENTAL SURGERY  1987     wisdom teeth    EXAM UNDER ANESTHESIA, FISTULOTOMY RECTUM, COMBINED N/A 09/16/2020    Procedure: Exam under anesthesia, fistulotomy rectum, combined;  Surgeon: Tessa Alfaro MD;  Location: [facility] OR    HERNIA REPAIR, INGUINAL RT/LT Bilateral 1979    LAPAROSCOPIC BIOPSY LIVER N/A 06/05/2020    Procedure: Laparoscopic Biopsy Liver;  Surgeon: Merrick Moncada MD;  Location: [facility] OR       Social History     Tobacco Use    Smoking status: Never    Smokeless tobacco: Never   Substance Use Topics    Alcohol use: Yes     Comment: Very casual     Family History   Problem Relation Age of Onset    Family History Negative Father     Family History Negative Mother     Diabetes Mother         Type 2    Coronary Stenting Brother     Anesthesia Reaction No family hx of     Deep Vein Thrombosis (DVT) No family hx of          Current Outpatient Medications   Medication Sig Dispense Refill    atorvastatin (LIPITOR) 20 MG tablet TAKE 1 TABLET DAILY 90 tablet 0    calcium carbonate 600 MG tablet       Cholecalciferol (VITAMIN D) 50 MCG (2000 UT) CAPS       inFLIXimab (REMICADE) 100 MG injection Administer Remicade 5mg/kg every eight weeks, infuse by IV route      losartan-hydrochlorothiazide (HYZAAR) 50-12.5 MG tablet TAKE 1 AND 1/2 TABLETS     DAILY 135 tablet 0    predniSONE (DELTASONE) 20 MG tablet Take 1 tablet (20 mg) by mouth 2 times daily 10 tablet 0     No Known Allergies  Recent Labs   Lab Test 01/10/24  1044 06/01/22  1042 04/21/21  1037 10/02/20  0000 09/10/20  0820   A1C 5.8* 5.3  --   --  5.3   LDL 26 46 99  --   --    HDL 27* 53 53  --   --    TRIG 82 92 82  --   --    ALT 19 30 27   < >  --    CR 0.87 0.85 0.83   < > 0.82   GFRESTIMATED >90 >90 >90  --  >90   GFRESTBLACK  --   --  >90  --  >90   POTASSIUM 3.5 4.1 3.8   < > 3.6   TSH  --  1.52  --   --   --     < > = values in this interval not displayed.          Review of Systems  Constitutional, neuro, ENT, endocrine, pulmonary, cardiac, gastrointestinal,  "genitourinary, musculoskeletal, integument and psychiatric systems are negative, except as otherwise noted.     Objective    Exam  /83 (BP Location: Left arm, Patient Position: Sitting, Cuff Size: Adult Large)   Pulse 70   Temp 97  F (36.1  C) (Temporal)   Resp 18   Ht 1.759 m (5' 9.25\")   Wt 132.9 kg (292 lb 14.4 oz)   SpO2 95%   BMI 42.94 kg/m     Estimated body mass index is 42.94 kg/m  as calculated from the following:    Height as of this encounter: 1.759 m (5' 9.25\").    Weight as of this encounter: 132.9 kg (292 lb 14.4 oz).    Physical Exam  GENERAL: alert and no distress  EYES: Eyes grossly normal to inspection, PERRL and conjunctivae and sclerae normal  HENT: ear canals and TM's normal, nose and mouth without ulcers or lesions  NECK: no adenopathy, no asymmetry, masses, or scars  RESP: lungs clear to auscultation - no rales, rhonchi or wheezes  CV: regular rate and rhythm, normal S1 S2, no S3 or S4, no murmur, click or rub, no peripheral edema  ABDOMEN: soft, nontender, no hepatosplenomegaly, no masses and bowel sounds normal  MS: no gross musculoskeletal defects noted, no edema  SKIN: no suspicious lesions or rashes  NEURO: Normal strength and tone, mentation intact and speech normal  PSYCH: mentation appears normal, affect normal/bright        Signed Electronically by: Tc Greenwood MD    "

## 2025-01-16 PROBLEM — D12.6 ADENOMATOUS COLON POLYP: Status: ACTIVE | Noted: 2025-01-16

## 2025-04-29 ASSESSMENT — SLEEP AND FATIGUE QUESTIONNAIRES
HOW LIKELY ARE YOU TO NOD OFF OR FALL ASLEEP WHILE SITTING AND TALKING TO SOMEONE: WOULD NEVER DOZE
HOW LIKELY ARE YOU TO NOD OFF OR FALL ASLEEP IN A CAR, WHILE STOPPED FOR A FEW MINUTES IN TRAFFIC: WOULD NEVER DOZE
HOW LIKELY ARE YOU TO NOD OFF OR FALL ASLEEP WHILE SITTING QUIETLY AFTER LUNCH WITHOUT ALCOHOL: WOULD NEVER DOZE
HOW LIKELY ARE YOU TO NOD OFF OR FALL ASLEEP WHILE SITTING AND READING: SLIGHT CHANCE OF DOZING
HOW LIKELY ARE YOU TO NOD OFF OR FALL ASLEEP WHEN YOU ARE A PASSENGER IN A CAR FOR AN HOUR WITHOUT A BREAK: SLIGHT CHANCE OF DOZING
HOW LIKELY ARE YOU TO NOD OFF OR FALL ASLEEP WHILE SITTING INACTIVE IN A PUBLIC PLACE: WOULD NEVER DOZE
HOW LIKELY ARE YOU TO NOD OFF OR FALL ASLEEP WHILE LYING DOWN TO REST IN THE AFTERNOON WHEN CIRCUMSTANCES PERMIT: WOULD NEVER DOZE
HOW LIKELY ARE YOU TO NOD OFF OR FALL ASLEEP WHILE WATCHING TV: SLIGHT CHANCE OF DOZING

## 2025-04-30 ENCOUNTER — VIRTUAL VISIT (OUTPATIENT)
Dept: SLEEP MEDICINE | Facility: CLINIC | Age: 55
End: 2025-04-30
Attending: INTERNAL MEDICINE
Payer: COMMERCIAL

## 2025-04-30 VITALS — HEIGHT: 70 IN | WEIGHT: 285 LBS | BODY MASS INDEX: 40.8 KG/M2

## 2025-04-30 DIAGNOSIS — G47.8 UNREFRESHED BY SLEEP: ICD-10-CM

## 2025-04-30 DIAGNOSIS — R53.83 FATIGUE, UNSPECIFIED TYPE: ICD-10-CM

## 2025-04-30 DIAGNOSIS — R29.818 SUSPECTED SLEEP APNEA: Primary | ICD-10-CM

## 2025-04-30 DIAGNOSIS — E66.01 MORBID OBESITY (H): ICD-10-CM

## 2025-04-30 DIAGNOSIS — R06.83 SNORING: ICD-10-CM

## 2025-04-30 DIAGNOSIS — F51.12 INSUFFICIENT SLEEP SYNDROME: ICD-10-CM

## 2025-04-30 PROCEDURE — 98002 SYNCH AUDIO-VIDEO NEW MOD 45: CPT | Performed by: INTERNAL MEDICINE

## 2025-04-30 PROCEDURE — 1125F AMNT PAIN NOTED PAIN PRSNT: CPT | Mod: 95 | Performed by: INTERNAL MEDICINE

## 2025-04-30 ASSESSMENT — PAIN SCALES - GENERAL: PAINLEVEL_OUTOF10: MILD PAIN (2)

## 2025-04-30 NOTE — PROGRESS NOTES
Virtual Visit Details    Type of service:  Video Visit   Originating Location (pt. Location): Home    Distant Location (provider location):  Off-site  Platform used for Video Visit: [facility]    Outpatient Sleep Medicine Consultation:      Name: Iglesia Wolff MRN# [id number]   Age: 55 year old YOB: 1970     Date of Consultation: April 30, 2025  Consultation is requested by: Tc Greenwood MD  [address] [address] Tc Greenwood  Primary care provider: Tc Greenwood       Reason for Sleep Consult:     Iglesia Wolff is sent by Tc Greenwood for a sleep consultation regarding obtaining evaluation for possible sleep apnea.    Patient s Reason for visit  Iglesia Wolff main reason for visit: (Patient-Rptd) Refered by PCP  Patient states problem(s) started:    Iglesia Wolff's goals for this visit:             Assessment and Plan:     Summary Sleep Diagnoses and Summary Recommendations:  --Snoring, nonrestorative sleep, fatigue.  STOP-BANG score 7 out of 8.  High pretest probability for obstructive sleep apnea  --Suspect possible coexistent obesity related hypoventilation based on BMI at 40.89 kg/m   --Insufficient sleep       We discussed home sleep apnea testing and polysomnography.  Patient was interested in HST.  Orders generated for HST to evaluate for possible JOAQUIN. If the home sleep study does not show evidence of JOAQUIN, we will consider obtaining in-lab sleep study and the patient was agreeable with the plan.   Discussed pathophysiology and risks of untreated JOAQUIN and treatment options for JOAQUIN.  Information provided regarding treatment options for JOAQUIN as summary.  Patient was amenable to treatment using CPAP device if indicated.  Patient will follow up 3 months after the sleep study. We will review results and [facility]if indicated[facility] to the follow up.   We discussed the consequences of chronic insufficient sleep. Encouraged the  "patient to increase his total sleep time by gradually advancing his bedtime, minimizing exposure to bright light a couple hours before bed, avoid mind stimulating activities/screen time before bed.  Patient was instructed to avoid caffeinated beverages within 6 hours before bed and avoid activities such as reading in bed which he does rarely.    We discussed weight management with diet and exercise.  Patient was strongly advised to avoid driving, operating any heavy machinery or other hazardous situations while drowsy or sleepy.  Patient was counseled on the importance of driving while alert, to pull over if drowsy, or nap before getting into the vehicle if sleepy.        Comorbid Diagnoses:   Morbid obesity (H)    Crohn's disease of large intestine with fistula (H)    Adenomatous colon polyp         Orders Placed This Encounter   Procedures    HST-Home Sleep Apnea Test - Noxturnal Returnable       Summary Counseling:    Sleep Testing Reviewed  Obstructive Sleep Apnea Reviewed  Complications of Untreated Sleep Apnea Reviewed       Medical Decision-making:   Educational materials provided in instructions      CC: Tc Greenwood MD     The above note was dictated using voice recognition software. Although reviewed after completion, some word and grammatical error may remain . Please contact the author for any clarifications.     \" Total time spent was 45 minutes for this appointment on this date of service which include time spent before, during and after the visit for chart review, patient care, counseling and coordination of care including documentation.\"      Migdalia Villanueva MD  [facility]  [address] [place] [address]            History of Present Illness:     Past Sleep Evaluations:no    SLEEP-WAKE SCHEDULE:     Work/School Days: Patient goes to school/work: (Patient-Rptd) Yes   Usually gets into bed at (Patient-Rptd) 11-midnight  Takes patient about " (Patient-Rptd) I generally fall asleep quickly to fall asleep  Has trouble falling asleep (Patient-Rptd) 0-1 nights per week  Wakes up in the middle of the night (Patient-Rptd) 0-2 times.  Wakes up due to (Patient-Rptd) Use the bathroom  He has trouble falling back asleep (Patient-Rptd) 0 times a week.   It usually takes (Patient-Rptd) Generally fall back to sleep very quickly   Patient is usually up at (Patient-Rptd) 6:15  Uses alarm: (Patient-Rptd) Yes    Weekends/Non-work Days/All Other Days:  Usually gets into bed at (Patient-Rptd) 11-monight   Takes patient about (Patient-Rptd) fall asleep quicly to fall asleep  Patient is usually up at (Patient-Rptd) 7:00  Uses alarm: (Patient-Rptd) No    He does not always rested upon awakening from sleep. He reports occasional fatigue towards midmorning couple times a week.   He denies EDS  Sleep Need  Patient gets  (Patient-Rptd) 6-7 hours sleep on average   Patient thinks he needs about (Patient-Rptd) 7 hours sleep    Iglesia Wolff prefers to sleep in this position(s): (Patient-Rptd) Side   Patient states they do the following activities in bed: (Patient-Rptd) Read;Other radio background noise, fan for white noise      Naps  Patient takes a purposeful nap (Patient-Rptd) 0 times a week and naps are usually   in duration  He feels better after a nap: (Patient-Rptd) No  He dozes off unintentionally (Patient-Rptd) 1 days per week  Patient has had a driving accident or near-miss due to sleepiness/drowsiness: (Patient-Rptd) No      SLEEP DISRUPTIONS:    Breathing/Snoring  Patient snores:(Patient-Rptd) Yes  Other people complain about his snoring: (Patient-Rptd) Yes  Patient has been told he stops breathing in his sleep:(Patient-Rptd) No  He has issues with the following: (Patient-Rptd) Getting up to urinate more than once    Movement:  Patient gets pain, discomfort, with an urge to move:  (Patient-Rptd) No  It happens when he is resting:  (Patient-Rptd) No  It happens more at  night:  (Patient-Rptd) No  Patient has been told he kicks his legs at night:  (Patient-Rptd) No     Behaviors in Sleep:  Iglesia Wolff has experienced the following behaviors while sleeping: (Patient-Rptd) Sleepwalking;Teeth grinding both when he was young  Last episode of sleepwalking occurred when he was 10 years old, none since then  There are no reports of sleep eating, sleep talking or dream enactment behavior    He has experienced sudden muscle weakness during the day: (Patient-Rptd) No      Is there anything else you would like your sleep provider to know: (Patient-Rptd) sleepwalking and teeth grinding were as a child      CAFFEINE AND OTHER SUBSTANCES:    Patient consumes caffeinated beverages per day:  (Patient-Rptd) 1  Last caffeine use is usually: (Patient-Rptd) sometimes mid evening  List of any prescribed or over the counter stimulants that patient takes:    List of any prescribed or over the counter sleep medication patient takes:    List of previous sleep medications that patient has tried:    Patient drinks alcohol to help them sleep: (Patient-Rptd) No  Patient drinks alcohol near bedtime: (Patient-Rptd) No    Family History:  Patient has a family member been diagnosed with a sleep disorder: (Patient-Rptd) No        SCALES:    EPWORTH SLEEPINESS SCALE         4/29/2025     7:25 PM    [place] Sleepiness Scale ( FE Chappell  6937-7095<br>ESS - [place]/English - Final version - 21 Nov 07 - [facility] [place].)   Sitting and reading Slight chance of dozing   Watching TV Slight chance of dozing   Sitting, inactive in a public place (e.g. a theatre or a meeting) Would never doze   As a passenger in a car for an hour without a break Slight chance of dozing   Lying down to rest in the afternoon when circumstances permit Would never doze   Sitting and talking to someone Would never doze   Sitting quietly after a lunch without alcohol Would never doze   In a car, while stopped for a few minutes in traffic  "Would never doze   [place] Score (MC) 3   [place] Score (Sleep) 3        Patient-reported         INSOMNIA SEVERITY INDEX (ISMA)          4/29/2025     7:15 PM   Insomnia Severity Index (ISMA)   Difficulty falling asleep 0   Difficulty staying asleep 1   Problems waking up too early 0   How SATISFIED/DISSATISFIED are you with your CURRENT sleep pattern? 1   How NOTICEABLE to others do you think your sleep problem is in terms of impairing the quality of your life? 0   How WORRIED/DISTRESSED are you about your current sleep problem? 0   To what extent do you consider your sleep problem to INTERFERE with your daily functioning (e.g. daytime fatigue, mood, ability to function at work/daily chores, concentration, memory, mood, etc.) CURRENTLY? 1   ISMA Total Score 3        Patient-reported       Guidelines for Scoring/Interpretation:  Total score categories:  0-7 = No clinically significant insomnia   8-14 = Subthreshold insomnia   15-21 = Clinical insomnia (moderate severity)  22-28 = Clinical insomnia (severe)  Used via courtesy of www.[employer].va.gov with permission from Ezio Sosa PhD., [facility]      STOP BANG 7/8 4/30/2025     2:04 PM   STOP BANG Questionnaire (  2008, the American Society of Anesthesiologists, Inc. [employer].)   B/P Clinic: --   BMI Clinic: 40.89         GAD7         No data to display                  CAGE-AID         No data to display                CAGE-AID reprinted with permission from the [place] Medical Journal, PIA Navas and WOODY Lieberman, \"Conjoint screening questionnaires for alcohol and drug abuse\" [place] Medical Journal 94: 135-140, 1995.      PATIENT HEALTH QUESTIONNAIRE-9 (PHQ - 9)         No data to display                Developed by Rock Miramontes, Laura Tomlin, Ben Rubi and colleagues, with an educational nabila from [employer] No permission required to reproduce, translate, display or distribute.        Allergies: " "   No Known Allergies    Medications:    Current Outpatient Medications   Medication Sig Dispense Refill    atorvastatin (LIPITOR) 20 MG tablet TAKE 1 TABLET DAILY 90 tablet 2    calcium carbonate 600 MG tablet       Cholecalciferol (VITAMIN D) 50 MCG (2000 UT) CAPS       inFLIXimab (REMICADE) 100 MG injection Administer Remicade 5mg/kg every eight weeks, infuse by IV route      losartan-hydrochlorothiazide (HYZAAR) 50-12.5 MG tablet TAKE 1 AND 1/2 TABLETS     DAILY 135 tablet 2    predniSONE (DELTASONE) 20 MG tablet Take 1 tablet (20 mg) by mouth 2 times daily 10 tablet 0       Problem List:  Patient Active Problem List    Diagnosis Date Noted    Adenomatous colon polyp 01/16/2025     Priority: Medium    Crohn's disease of large intestine with fistula (H) 04/21/2021     Priority: Medium    Morbid obesity (H) 04/14/2020     Priority: Medium    CARDIOVASCULAR SCREENING; LDL GOAL LESS THAN 160 10/31/2010     Priority: Medium        Past Medical/Surgical History:  Past Medical History:   Diagnosis Date    Crohn's disease of large intestine with fistula (H) 04/21/2021    Gallstones     Gallstones 05/20/2020    Added automatically from request for surgery [id number]      Hepatic steatosis     HTN (hypertension)     Knee pain     Lumbar radiculopathy     Migraine     Rare, \"stress-related\"     Past Surgical History:   Procedure Laterality Date    BIOPSY  6/5/20    liver sample as part of gallbladder removal    CHOLECYSTECTOMY  6/5/20    COLONOSCOPY N/A 09/16/2020    Procedure: 1.  Intraoperative colonoscopy with polypectomy x 2 and random biopsies of the colon for colitis.    2.  Exam under anesthesia, excision of papilla, superficial fistulotomy and biopsy of lesion.;  Surgeon: Tessa Alfaro MD;  Location:  OR    [facility] LAPAROSCOPIC CHOLECYSTECTOMY WITHOUT GRAMS N/A 06/05/2020    Procedure: CHOLECYSTECTOMY, ROBOT-ASSISTED, LAPAROSCOPIC;  Surgeon: Merrick Moncada MD;  Location:  OR    DENTAL SURGERY  1987 "    wisdom teeth    EXAM UNDER ANESTHESIA, FISTULOTOMY RECTUM, COMBINED N/A 09/16/2020    Procedure: Exam under anesthesia, fistulotomy rectum, combined;  Surgeon: Tessa Alfaro MD;  Location: [facility] OR    HERNIA REPAIR, INGUINAL RT/LT Bilateral 1979    LAPAROSCOPIC BIOPSY LIVER N/A 06/05/2020    Procedure: Laparoscopic Biopsy Liver;  Surgeon: Merrick Moncada MD;  Location: [facility] OR       Social History:  Social History     Socioeconomic History    Marital status:      Spouse name: Not on file    Number of children: 0    Years of education: Not on file    Highest education level: Not on file   Occupational History     Employer: [employer]   Tobacco Use    Smoking status: Never    Smokeless tobacco: Never   Vaping Use    Vaping status: Never Used   Substance and Sexual Activity    Alcohol use: Yes     Comment: Very casual    Drug use: Never    Sexual activity: Yes     Partners: Female     Birth control/protection: None   Other Topics Concern    Parent/sibling w/ CABG, MI or angioplasty before 65F 55M? No   Social History Narrative    Not on file     Social Drivers of Health     Financial Resource Strain: Low Risk  (1/15/2025)    Financial Resource Strain     Within the past 12 months, have you or your family members you live with been unable to get utilities (heat, electricity) when it was really needed?: No   Food Insecurity: Low Risk  (1/15/2025)    Food Insecurity     Within the past 12 months, did you worry that your food would run out before you got money to buy more?: No     Within the past 12 months, did the food you bought just not last and you didn t have money to get more?: No   Transportation Needs: Low Risk  (1/15/2025)    Transportation Needs     Within the past 12 months, has lack of transportation kept you from medical appointments, getting your medicines, non-medical meetings or appointments, work, or from getting things that you need?: No   Physical Activity: Insufficiently  Active (1/15/2025)    Exercise Vital Sign     Days of Exercise per Week: 2 days     Minutes of Exercise per Session: 20 min   Stress: No Stress Concern Present (1/15/2025)    [employer] [place] [employer] - Occupational Stress Questionnaire     Feeling of Stress : Not at all   Social Connections: Unknown (1/15/2025)    Social Connection and Isolation Panel [NHANES]     Frequency of Communication with Friends and Family: Not on file     Frequency of Social Gatherings with Friends and Family: Once a week     Attends Quaker Services: Not on file     Active Member of Clubs or Organizations: Not on file     Attends Club or Organization Meetings: Not on file     Marital Status: Not on file   Interpersonal Safety: Low Risk  (1/15/2025)    Interpersonal Safety     Do you feel physically and emotionally safe where you currently live?: Yes     Within the past 12 months, have you been hit, slapped, kicked or otherwise physically hurt by someone?: No     Within the past 12 months, have you been humiliated or emotionally abused in other ways by your partner or ex-partner?: No   Housing Stability: Low Risk  (1/15/2025)    Housing Stability     Do you have housing? : Yes     Are you worried about losing your housing?: No       Family History:  Family History   Problem Relation Age of Onset    Family History Negative Father     Family History Negative Mother     Diabetes Mother         Type 2    Coronary Stenting Brother     Anesthesia Reaction No family hx of     Deep Vein Thrombosis (DVT) No family hx of        Review of Systems:  A complete review of systems reviewed by me is negative with the exeption of what has been mentioned in the history of present illness.  In the last TWO WEEKS have you experienced any of the following symptoms?  Fevers: (Patient-Rptd) No  Night Sweats: (Patient-Rptd) No  Weight Gain: (Patient-Rptd) No  Pain at Night: (Patient-Rptd) No  Double Vision: (Patient-Rptd) No  Changes in Vision:  "(Patient-Rptd) No  Difficulty Breathing through Nose: (Patient-Rptd) No  Sore Throat in Morning: (Patient-Rptd) No  Dry Mouth in the Morning: (Patient-Rptd) Yes  Shortness of Breath Lying Flat: (Patient-Rptd) No  Shortness of Breath With Activity: (Patient-Rptd) No  Awakening with Shortness of Breath: (Patient-Rptd) No  Increased Cough: (Patient-Rptd) No  Heart Racing at Night: (Patient-Rptd) No  Swelling in Feet or Legs: (Patient-Rptd) No  Diarrhea at Night: (Patient-Rptd) No  Heartburn at Night: (Patient-Rptd) No  Urinating More than Once at Night: (Patient-Rptd) Yes  Losing Control of Urine at Night: (Patient-Rptd) No  Joint Pains at Night: (Patient-Rptd) Yes  Headaches in Morning: (Patient-Rptd) No  Weakness in Arms or Legs: (Patient-Rptd) No  Depressed Mood: (Patient-Rptd) No  Anxiety: (Patient-Rptd) No      Physical Examination:  Vitals: Ht 1.778 m (5' 10\")   Wt 129.3 kg (285 lb)   BMI 40.89 kg/m    BMI= Body mass index is 40.89 kg/m .    General: No apparent distress, appropriately groomed  Head: Normocephalic, atraumatic  Neck:Circumference:18 inches  Chest: No cough, no audible wheezing, able to talk in full sentences.  Psych: Mood and affect is normal   Neuro:  Mental status: Alert and  Oriented X 3  Speech: normal            Data: All pertinent previous laboratory data reviewed     Recent Labs   Lab Test 01/15/25  1035 01/10/24  1044    139   POTASSIUM 4.2 3.5   CHLORIDE 104 101   CO2 27 28   ANIONGAP 10 10   * 112*   BUN 13.5 15.3   CR 0.89 0.87   EMMA 9.9 8.9       Recent Labs   Lab Test 01/15/25  1035   WBC 9.6   RBC 5.30   HGB 15.8   HCT 45.8   MCV 86   MCH 29.8   MCHC 34.5   RDW 12.6          Recent Labs   Lab Test 01/15/25  1035   PROTTOTAL 7.3   ALBUMIN 4.1   BILITOTAL 1.3*   ALKPHOS 91   AST 35   ALT 30       TSH (mU/L)   Date Value   06/01/2022 1.52   12/16/2009 2.25       No results found for: \"UAMP\", \"UBARB\", \"BENZODIAZEUR\", \"UCANN\", \"UCOC\", \"OPIT\", \"UPCP\"    No results " "found for: \"IRONSAT\", \"AF29370\", \"FATOU\"    No results found for: \"PH\", \"PHARTERIAL\", \"PO2\", \"XK8SVOYVGIT\", \"SAT\", \"PCO2\", \"HCO3\", \"BASEEXCESS\", \"JAMEEL\", \"BEB\"       Echocardiography: No results found for this or any previous visit (from the past 4320 hours).    Chest x-ray: No results found for this or any previous visit from the past 365 days.      Chest CT: No results found for this or any previous visit from the past 365 days.      PFT: Most Recent [place] Pulmonary Function Testing: No results found       Migdalia Villanueva MD 4/30/2025    "

## 2025-04-30 NOTE — NURSING NOTE
Current patient location: Patient declined to provide     Is the patient currently in the state of [place]? YES    Visit mode: VIDEO    If the visit is dropped, the patient can be reconnected by:VIDEO VISIT: Text to cell phone:   Telephone Information:   Mobile [phone]       Will anyone else be joining the visit? NO  (If patient encounters technical issues they should call [phone] :[id number])    Are changes needed to the allergy or medication list? Pt stated no changes to allergies and Pt stated no med changes    Are refills needed on medications prescribed by this physician? NO    Rooming Documentation:  Questionnaire(s) completed    Reason for visit: Consult    Gabriela ORANTES

## 2025-04-30 NOTE — PATIENT INSTRUCTIONS
"Summary recommendations:  Our clinic staff will contact you to schedule the home sleep study to evaluate for possible sleep apnea.  Please review the information provided below regarding the different treatment options for sleep apnea.  We will review results and initiate treatment (if indicated) over [facility] prior to the follow up which will be approximately 3 months after you sleep study is completed.   We discussed the consequences of chronic insufficient sleep.  We encourage you to increase your total sleep time (aim at obtaining at least 7 up to 8 hours of sleep if possible per night) by gradually advancing the bedtime, minimizing exposure to bright light a couple hours before bed, and avoiding mind stimulating activities/screen time before bed.  Please avoid caffeinated beverages within 6 hours before bed and avoid reading in bed which you mentioned you do rarely.    Please follow healthy diet and exercise regularly.  Please avoid driving, operating any heavy machinery or other hazardous situations while drowsy or sleepy.                   MY TREATMENT INFORMATION FOR SLEEP APNEA-  Iglesia Wolff    DOCTOR : Migdalia Villanueva MD       Am I having a home sleep study?  --->Watch the video for the device you are using:    -/drop off device-   https://www.OMGPOP.com/watch?v=yGGFBdELGhk      Frequently asked questions:  1. What is Obstructive Sleep Apnea (JOAQUIN)? JOAQUIN is the most common type of sleep apnea. Apnea means, \"without breath.\"  Apnea is most often caused by narrowing or collapse of the upper airway as muscles relax during sleep.   Almost everyone has occasional apneas. Most people with sleep apnea have had brief interruptions at night frequently for many years.  The severity of sleep apnea is related to how frequent and severe the events are.   2. What are the consequences of JOAQUIN? Symptoms include: feeling sleepy during the day, snoring loudly, gasping or stopping of breathing, " trouble sleeping, and occasionally morning headaches or heartburn at night.  Sleepiness can be serious and even increase the risk of falling asleep while driving. Other health consequences may include development of high blood pressure and other cardiovascular disease in persons who are susceptible. Untreated JOAQUIN  can contribute to heart disease, stroke and diabetes.   3. What are the treatment options? In most situations, sleep apnea is a lifelong disease that must be managed with daily therapy. Medications are not effective for sleep apnea and surgery is generally not considered until other therapies have been tried. Your treatment is your choice . Continuous Positive Airway (CPAP) works right away and is the therapy that is effective in nearly everyone. An oral device to hold your jaw forward is usually the next most reliable option. Other options include postioning devices (to keep you off your back), weight loss, and surgery including a tongue pacing device. There is more detail about some of these options below.  4. Are my sleep studies covered by insurance? Although we will request verification of coverage, we advise you also check in advance of the study to ensure there is coverage.    Important tips for those choosing CPAP and similar devices  REMEMBER-IF YOU RECEIVE A CALL FROM  [phone]-->IT IS TO SETUP A DEVICE  For new devices, sign up for device JOÃO to monitor your device for your followup visits  We encourage you to utilize the [facility] jo[facility] or website ( [email or web address].Tiragiu/ ) to monitor your therapy progress and share the data with your healthcare team when you discuss your sleep apnea.                                                    Know your equipment:  CPAP is continuous positive airway pressure that prevents obstructive sleep apnea by keeping the throat from collapsing while you are sleeping. In most cases, the device is  smart  and can slowly self-adjusts if your throat  collapses and keeps a record every day of how well you are treated-this information is available to you and your care team.  BPAP is bilevel positive airway pressure that keeps your throat open and also assists each breath with a pressure boost to maintain adequate breathing.  Special kinds of BPAP are used in patients who have inadequate breathing from lung or heart disease. In most cases, the device is  smart  and can slowly self-adjusts to assist breathing. Like CPAP, the device keeps a record of how well you are treated.  Your mask is your connection to the device. You get to choose what feels most comfortable and the staff will help to make sure if fits. Here: are some examples of the different masks that are available: Magnetic mask aids may assist with use but there are safety issues that should be addressed when considering with magnets* ( see end of discussion).       Key points to remember on your journey with sleep apnea:  Sleep study.  PAP devices often need to be adjusted during a sleep study to show that they are effective and adjusted right.  Good tips to remember: Try wearing just the mask during a quiet time during the day so your body adapts to wearing it. A humidifier is recommended for comfort in most cases to prevent drying of your nose and throat. Allergy medication from your provider may help you if you are having nasal congestion.  Getting settled-in. It takes more than one night for most of us to get used to wearing a mask. Try wearing just the mask during a quiet time during the day so your body adapts to wearing it. A humidifier is recommended for comfort in most cases. Our team will work with you carefully on the first day and will be in contact within 4 days and again at 2 and 4 weeks for advice and remote device adjustments. Your therapy is evaluated by the device each day.   Use it every night. The more you are able to sleep naturally for 7-8 hours, the more likely you will have good  sleep and to prevent health risks or symptoms from sleep apnea. Even if you use it 4 hours it helps. Occasionally all of us are unable to use a medical therapy, in sleep apnea, it is not dangerous to miss one night.   Communicate. Call our skilled team on the number provided on the first day if your visit for problems that make it difficult to wear the device. Over 2 out of 3 patients can learn to wear the device long-term with help from our team. Remember to call our team or your sleep providers if you are unable to wear the device as we may have other solutions for those who cannot adapt to mask CPAP therapy. It is recommended that you sleep your sleep provider within the first 3 months and yearly after that if you are not having problems.   Use it for your health. We encourage use of CPAP masks during daytime quiet periods to allow your face and brain to adapt to the sensation of CPAP so that it will be a more natural sensation to awaken to at night or during naps. This can be very useful during the first few weeks or months of adapting to CPAP though it does not help medically to wear CPAP during wakefulness and  should not be used as a strategy just to meet guidelines.  Take care of your equipment. Make sure you clean your mask and tubing using directions every day and that your filter and mask are replaced as recommended or if they are not working.     *Masks with magnets:  Updated Contraindications  Masks with magnetic components are contraindicated for use by patients where they, or anyone in close physical contact while using the mask, have the following:   Active medical implants that interact with magnets (i.e., pacemakers, implantable cardioverter defibrillators (ICD), neurostimulators, cerebrospinal fluid (CSF) shunts, insulin/infusion pumps)   Metallic implants/objects containing ferromagnetic material (i.e., aneurysm clips/flow disruption devices, embolic coils, stents, valves, electrodes, implants to  restore hearing or balance with implanted magnets, ocular implants, metallic splinters in the eye)  Updated Warning  Keep the mask magnets at a safe distance of at least 6 inches (150 mm) away from implants or medical devices that may be adversely affected by magnetic interference. This warning applies to you or anyone in close physical contact with your mask. The magnets are in the frame and lower headgear clips, with a magnetic field strength of up to 400mT. When worn, they connect to secure the mask but may inadvertently detach while asleep.  Implants/medical devices, including those listed within contraindications, may be adversely affected if they change function under external magnetic fields or contain ferromagnetic materials that attract/repel to magnetic fields (some metallic implants, e.g., contact lenses with metal, dental implants, metallic cranial plates, screws, monika hole covers, and bone substitute devices). Consult your physician and  of your implant / other medical device for information on the potential adverse effects of magnetic fields.    BESIDES CPAP, WHAT OTHER THERAPIES ARE THERE?    Positioning Device  Positioning devices are generally used when sleep apnea is mild and only occurs on your back.This example shows a pillow that straps around the waist. It may be appropriate for those whose sleep study shows milder sleep apnea that occurs primarily when lying flat on one's back. Preliminary studies have shown benefit but effectiveness at home may need to be verified by a home sleep test. These devices are generally not covered by medical insurance.  Examples of devices that maintain sleeping on the back to prevent snoring and mild sleep apnea.    Belt type body positioner  http://Add2paper.[employer]/    Electronic reminder  http://nightshifttherapy.com/            Oral Appliance  What is oral appliance therapy?  An oral appliance device fits on your teeth at night like a retainer used  after having braces. The device is made by a specialized dentist and requires several visits over 1-2 months before a manufactured device is made to fit your teeth and is adjusted to prevent your sleep apnea. Once an oral device is working properly, snoring should be improved. A home sleep test may be recommended at that time if to determine whether the sleep apnea is adequately treated.       Some things to remember:  -Oral devices are often, but not always, covered by your medical insurance. Be sure to check with your insurance provider.   -If you are referred for oral therapy, you will be given a list of specialized dentists to consider or you may choose to visit the Web site of the American Academy of Dental Sleep Medicine  -Oral devices are less likely to work if you have severe sleep apnea or are extremely overweight.     More detailed information  An oral appliance is a small acrylic device that fits over the upper and lower teeth  (similar to a retainer or a mouth guard). This device slightly moves jaw forward, which moves the base of the tongue forward, opens the airway, improves breathing for effective treat snoring and obstructive sleep apnea in perhaps 7 out of 10 people .  The best working devices are custom-made by a dental device  after a mold is made of the teeth 1, 2, 3.  When is an oral appliance indicated?  Oral appliance therapy is recommended as a first-line treatment for patients with primary snoring, mild sleep apnea, and for patients with moderate sleep apnea who prefer appliance therapy to use of CPAP4, 5. Severity of sleep apnea is determined by sleep testing and is based on the number of respiratory events per hour of sleep.   How successful is oral appliance therapy?  The success rate of oral appliance therapy in patients with mild sleep apnea is 75-80% while in patients with moderate sleep apnea it is 50-70%. The chance of success in patients with severe sleep apnea is  40-50%. The research also shows that oral appliances have a beneficial effect on the cardiovascular health of JOAQUIN patients at the same magnitude as CPAP therapy7.  Oral appliances should be a second-line treatment in cases of severe sleep apnea, but if not completely successful then a combination therapy utilizing CPAP plus oral appliance therapy may be effective. Oral appliances tend to be effective in a broad range of patients although studies show that the patients who have the highest success are females, younger patients, those with milder disease, and less severe obesity. 3, 6.   Finding a dentist that practices dental sleep medicine  Specific training is available through the American Academy of Dental Sleep Medicine for dentists interested in working in the field of sleep. To find a dentist who is educated in the field of sleep and the use of oral appliances, near you, visit the Web site of the American Academy of Dental Sleep Medicine.    References  1. Katarina et al. Objectively measured vs self-reported compliance during oral appliance therapy for sleep-disordered breathing. Chest 2013; 144(5): 8244-9834.  2. Brayden et al. Objective measurement of compliance during oral appliance therapy for sleep-disordered breathing. Thorax 2013; 68(1): 91-96.  3. Stephen, et al. Mandibular advancement devices in 620 men and women with JOAQUIN and snoring: tolerability and predictors of treatment success. Chest 2004; 125: 3150-6258.  4. Kulwant, et al. Oral appliances for snoring and JOAQUIN: a review. Sleep 2006; 29: 244-262.  5. Curtis et al. Oral appliance treatment for JOAQUIN: an update. J Clin Sleep Med 2014; 10(2): 215-227.  6. Franki et al. Predictors of OSAH treatment outcome. J Dent Res 2007; 86: 2173-0819.      Weight Loss:   Your Body mass index is 40.89 kg/m .    Being overweight does not necessarily mean you will have health consequences.  Those who have BMI over 30 or over 27 with existing medical  conditions carries greater risk.   Weight loss decreases severity of sleep apnea in most people with obesity. For those with mild obesity who have developed snoring with weight gain, even 15-30 pound weight loss can improve and occasionally milder eliminate sleep apnea.  Structured and life-long dietary and health habits are necessary to lose weight and keep healthier weight levels.     The Comprehensive Weight loss program offers all aspects of weight loss strategies including two Non-Surgical Weight Loss Programs: Medical Weight Management and our 24 Week Healthy Lifestyle Program:  Medical Weight Management: You will meet with a Medical Weight Management Provider, as well as a Registered Dietician. The program may include medication therapy, dietary education, recommended exercise and physical therapy programs, monthly support group meetings, and possible psychological counseling. Follow up visits with the provider or dietician are scheduled based on your progress and needs.  24 Week Healthy Lifestyle Program: This unique program is designed to give you the support of weekly appointments and activities thru a 24-week period. It may include all of the components of the basic program (above), with the addition of 11 individual Health  Visits, 24-week access to the [employer] website for over 700 online classes, and monthly support group meetings. This program has an out-of-pocket expense of $499 to cover the items that can not be billed to insurance (health coaches and [employer] access), and is non-refundable/non-transferable (you may be able to use a Health Savings Account; ask your [facility] provider). There may be an optional meal replacement plan prescribed as well.   Medication therapy has been approved for the treatment of sleep apnea: The FDA approved tirzepatide (ZEPBOUND) for moderate to severe sleep apnea (apnea-hypopnea index greater than or equal to 15) in patients with BMI of greater than or equal to 30,  or BMI greater than or equal to 27 with at least 1 weight-related condition such as hypertension or dyslipidemia.  Surgical management achieves meaningful long-term weight loss and improvement in health risks in most patients with more severe obesity.      Sleep Apnea Surgery:    Surgery for obstructive sleep apnea is considered generally only when other therapies fail to work. Surgery may be discussed with you if you are having a difficult time tolerating CPAP and or when there is an abnormal structure that requires surgical correction.  Nose and throat surgeries often enlarge the airway to prevent collapse.  Most of these surgeries create pain for 1-2 weeks and up to half of the most common surgeries are not effective throughout life.  You should carefully discuss the benefits and drawbacks to surgery with your sleep provider and surgeon to determine if it is the best solution for you.   More information  Surgery for JOAQUIN is directed at areas that are responsible for narrowing or complete obstruction of the airway during sleep.  There are a wide range of procedures available to enlarge and/or stabilize the airway to prevent blockage of breathing in the three major areas where it can occur: the palate, tongue, and nasal regions.  Successful surgical treatment depends on the accurate identification of the factors responsible for obstructive sleep apnea in each person.  A personalized approach is required because there is no single treatment that works well for everyone.  Because of anatomic variation, consultation with an examination by a sleep surgeon is a critical first step in determining what surgical options are best for each patient.  In some cases, examination during sedation may be recommended in order to guide the selection of procedures.  Patients will be counseled about risks and benefits as well as the typical recovery course after surgery. Surgery is typically not a cure for a person s JOAQUIN.  However,  surgery will often significantly improve one s JOAQUIN severity (termed  success rate ).  Even in the absence of a cure, surgery will decrease the cardiovascular risk associated with OSA7; improve overall quality of life8 (sleepiness, functionality, sleep quality, etc).      Palate Procedures:  Patients with JOAQUIN often have narrowing of their airway in the region of their tonsils and uvula.  The goals of palate procedures are to widen the airway in this region as well as to help the tissues resist collapse.  Modern palate procedure techniques focus on tissue conservation and soft tissue rearrangement, rather than tissue removal.  Often the uvula is preserved in this procedure. Residual sleep apnea is common in patient after pharyngoplasty with an average reduction in sleep apnea events of 33%2.      Tongue Procedures:  ExamWhile patients are awake, the muscles that surround the throat are active and keep this region open for breathing. These muscles relax during sleep, allowing the tongue and other structures to collapse and block breathing.  There are several different tongue procedures available.  Selection of a tongue base procedure depends on characteristics seen on physical exam.  Generally, procedures are aimed at removing bulky tissues in this area or preventing the back of the tongue from falling back during sleep.  Success rates for tongue surgery range from 50-62%3.    Hypoglossal Nerve Stimulation:  Hypoglossal nerve stimulation has recently received approval from the [place] Food and Drug Administration for the treatment of obstructive sleep apnea.  This is based on research showing that the system was safe and effective in treating sleep apnea6.  Results showed that the median AHI score decreased 68%, from 29.3 to 9.0. This therapy uses an implant system that senses breathing patterns and delivers mild stimulation to airway muscles, which keeps the airway open during sleep.  The system consists of  three fully implanted components: a small generator (similar in size to a pacemaker), a breathing sensor, and a stimulation lead.  Using a small handheld remote, a patient turns the therapy on before bed and off upon awakening.    Candidates for this device must be greater than 18 years of age, have moderate to severe obstructive sleep apnea with less than 25% central events  (AHI between 15-65), BMI less than 35, have tried CPAP/oral appliance for at least 8 weeks without success, and have appropriate upper airway anatomy (determined by a sleep endoscopy performed by Dr. Sebastián Whatley or Dr. Aaron Jacob).     Nasal Procedures:  Nasal obstruction can interfere with nasal breathing during the day and night.  Studies have shown that relief of nasal obstruction can improve the ability of some patients to tolerate positive airway pressure therapy for obstructive sleep apnea1.  Treatment options include medications such as nasal saline, topical corticosteroid and antihistamine sprays, and oral medications such as antihistamines or decongestants. Non-surgical treatments can include external nasal dilators for selected patients. If these are not successful by themselves, surgery can improve the nasal airway either alone or in combination with these other options.        Combination Procedures:  Combination of surgical procedures and other treatments may be recommended, particularly if patients have more than one area of narrowing or persistent positional disease.  The success rate of combination surgery ranges from 66-80%2,3.    References  Richar CARRION. The Role of the Nose in Snoring and Obstructive Sleep Apnoea: An Update.  Eur Arch Otorhinolaryngol. 2011; 268: 1365-73.   Ashish SM; Steve JA; Serge JR; Pallanch JF; Bonnie TAVARES; Elbert SG; Apple BASILIO. Surgical modifications of the upper airway for obstructive sleep apnea in adults: a systematic review and meta-analysis. SLEEP 2010;33(10):4368-9025. Britney TOLLIVER  Hypopharyngeal surgery in obstructive sleep apnea: an evidence-based medicine review.  Arch Otolaryngol Head Neck Surg. 2006 Feb;132(2):206-13.  Brenden YH1, Sheryl Y, [place] ERICK. The efficacy of anatomically based multilevel surgery for obstructive sleep apnea. Otolaryngol Head Neck Surg. 2003 Oct;129(4):327-35.  Britney PRYOR, Goldberg A. Hypopharyngeal Surgery in Obstructive Sleep Apnea: An Evidence-Based Medicine Review. Arch Otolaryngol Head Neck Surg. 2006 Feb;132(2):206-13.  Emperatriz ALLEN et al. Upper-Airway Stimulation for Obstructive Sleep Apnea.  N Engl J Med. 2014 Jan 9;370(2):139-49.  Alexandria Y et al. Increased Incidence of Cardiovascular Disease in Middle-aged Men with Obstructive Sleep Apnea. Am J Respir Crit Care Med; 2002 166: 159-165  Cardenas EM et al. Studying Life Effects and Effectiveness of Palatopharyngoplasty (SLEEP) study: Subjective Outcomes of Isolated Uvulopalatopharyngoplasty. Otolaryngol Head Neck Surg. 2011; 144: 623-631.        WHAT IF I ONLY HAVE SNORING?    Mandibular advancement devices, lateral sleep positioning, long-term weight loss and treatment of nasal allergies have been shown to improve snoring.  Exercising tongue muscles with a game (https://[employer]/us/joão/Betabrand-reduce-snoring/pj4149895281) or stimulating the tongue during the day with a device (https://doi.org/10.3390/vsv80891215) have improved snoring in some individuals.  https://www.Joyme.com.dscovered/  https://www.sleepfoundation.org/best-anti-snoring-mouthpieces-and-mouthguards    Remember to Drive Safe... Drive Alive     Sleep health profoundly affects your health, mood, and your safety.  Thirty three percent of the population (one in three of us) is not getting enough sleep and many have a sleep disorder. Not getting enough sleep or having an untreated / undertreated sleep condition may make us sleepy without even knowing it. In fact, our driving could be dramatically impaired due to our sleep health. As your provider, here  are some things I would like you to know about driving:     Here are some warning signs for impairment and dangerous drowsy driving:              -Having been awake more than 16 hours               -Looking tired               -Eyelid drooping              -Head nodding (it could be too late at this point)              -Driving for more than 30 minutes     Some things you could do to make the driving safer if you are experiencing some drowsiness:              -Stop driving and rest              -Call for transportation              -Make sure your sleep disorder is adequately treated     Some things that have been shown NOT to work when experiencing drowsiness while driving:              -Turning on the radio              -Opening windows              -Eating any  distracting  /  entertaining  foods (e.g., sunflower seeds, candy, or any other)              -Talking on the phone      Your decision may not only impact your life, but also the life of others. Please, remember to drive safe for yourself and all of us.

## 2025-05-04 ENCOUNTER — OFFICE VISIT (OUTPATIENT)
Dept: URGENT CARE | Facility: URGENT CARE | Age: 55
End: 2025-05-04
Payer: COMMERCIAL

## 2025-05-04 VITALS
OXYGEN SATURATION: 95 % | SYSTOLIC BLOOD PRESSURE: 158 MMHG | TEMPERATURE: 97.4 F | DIASTOLIC BLOOD PRESSURE: 89 MMHG | HEART RATE: 89 BPM | WEIGHT: 295 LBS | BODY MASS INDEX: 42.33 KG/M2 | RESPIRATION RATE: 18 BRPM

## 2025-05-04 DIAGNOSIS — S61.310A LACERATION OF RIGHT INDEX FINGER WITHOUT FOREIGN BODY WITH DAMAGE TO NAIL, INITIAL ENCOUNTER: Primary | ICD-10-CM

## 2025-05-04 PROCEDURE — 3079F DIAST BP 80-89 MM HG: CPT | Performed by: PHYSICIAN ASSISTANT

## 2025-05-04 PROCEDURE — 12002 RPR S/N/AX/GEN/TRNK2.6-7.5CM: CPT | Performed by: PHYSICIAN ASSISTANT

## 2025-05-04 PROCEDURE — 3077F SYST BP >= 140 MM HG: CPT | Performed by: PHYSICIAN ASSISTANT

## 2025-05-04 ASSESSMENT — ENCOUNTER SYMPTOMS: WOUND: 1

## 2025-05-04 NOTE — PROGRESS NOTES
Urgent Care Clinic Visit  Rapid rooming was initiated.  Provider was consulted, patient will remain in room and provider will be with patient as soon as possible.  Chief Complaint   Patient presents with    Urgent Care     15-20 minutes ago cut finger with circular saw.  TD - 2020 5/4/2025    11:51 AM   Additional Questions   Roomed by roberto porras RN   Accompanied by Wife - Ana

## 2025-05-04 NOTE — PROGRESS NOTES
"SUBJECTIVE:   Iglesia Wolff is a 55 year old male presenting with a chief complaint of   Chief Complaint   Patient presents with    Urgent Care     15-20 minutes ago cut finger with circular saw.  TD - 2020       He is an established patient of [facility].  Patient presents for right index finger laceration with a circular saw PTA.  Ambidextrous.  No gloves or other protection.    Laceration    Mechanism of injury: circular saw  History provided by: ptaient  Time of injury was 2 hours(s) ago.    This is a non-work related injury.    Associated symptoms: Denies numbness, weakness, or loss of function    Last tetanus booster within 10 years: Yes    LACERATION EXAM:   Size of laceration: 3 centimeters  Characteristics of the laceration: clean  Depth of laceration: superficial  Tendon function intact: Yes  Sensation to light touch intact: Yes  Pulses/capillary refill intact: Yes  Foreign body: No    Picture included in patient's chart: no    PROCEDURE NOTE:  Anesthesia: Wound was locally injected with 1 cc's of  Lidocaine 1% plain and LET was applied  Prepped and draped in the usual sterile fashion  Wound irrigated with sterile water  Wound was explored for any foreign bodies and evaluated for tendon, nerve, vessel or joint involvement.    Closure was simple  Laceration was closed with 4 X 4.0 Nylon interrupted sutures  Bandage was applied  Patient tolerated the procedure well            Review of Systems   Skin:  Positive for wound.   All other systems reviewed and are negative.      Past Medical History:   Diagnosis Date    Crohn's disease of large intestine with fistula (H) 04/21/2021    Gallstones     Gallstones 05/20/2020    Added automatically from request for surgery [id number]      Hepatic steatosis     HTN (hypertension)     Knee pain     Lumbar radiculopathy     Migraine     Rare, \"stress-related\"     Family History   Problem Relation Age of Onset    Family History Negative Father     Family History Negative " Mother     Diabetes Mother         Type 2    Coronary Stenting Brother     Anesthesia Reaction No family hx of     Deep Vein Thrombosis (DVT) No family hx of      Current Outpatient Medications   Medication Sig Dispense Refill    atorvastatin (LIPITOR) 20 MG tablet TAKE 1 TABLET DAILY 90 tablet 2    inFLIXimab (REMICADE) 100 MG injection Administer Remicade 5mg/kg every eight weeks, infuse by IV route      losartan-hydrochlorothiazide (HYZAAR) 50-12.5 MG tablet TAKE 1 AND 1/2 TABLETS     DAILY 135 tablet 2    predniSONE (DELTASONE) 20 MG tablet Take 1 tablet (20 mg) by mouth 2 times daily 10 tablet 0    calcium carbonate 600 MG tablet       Cholecalciferol (VITAMIN D) 50 MCG (2000 UT) CAPS        Social History     Tobacco Use    Smoking status: Never    Smokeless tobacco: Never   Substance Use Topics    Alcohol use: Yes     Comment: Very casual       OBJECTIVE  BP (!) 158/89   Pulse 89   Temp 97.4  F (36.3  C) (Tympanic)   Resp 18   Wt 133.8 kg (295 lb)   SpO2 95%   BMI 42.33 kg/m      Physical Exam  Vitals and nursing note reviewed.   Constitutional:       Appearance: Normal appearance. He is normal weight.   Cardiovascular:      Rate and Rhythm: Normal rate.   Skin:     Comments: Right hand index finger:  distal nail lacerated about 2-3 mm and opposite cuticle jagged.  Palm surface with diagonal 3 cm laceration.  No tendon involvement.  Some skin avulsion.  Normal ROM   Neurological:      Mental Status: He is alert.         Labs:  No results found for this or any previous visit (from the past 24 hours).    X-Ray was not done.    ASSESSMENT:      ICD-10-CM    1. Laceration of right index finger without foreign body with damage to nail, initial encounter  S61.310A            Medical Decision Making:    Differential Diagnosis:  laceration    Serious Comorbid Conditions:  Adult:   reviewed    PLAN:    Laceration:    Keep wound clean and dry for the next 24-48 hours  Ok to shower and get wound wet after 48  hours, but do not soak for 2 weeks  Wound follow-up: Return for suture removal in 14 days  May return to work/school as long as wound is kept clean and dry  Discussed the probability of scarring  Active range of motion exercises encouraged for finger lacerations    Patient will return immediately if they experience redness around the laceration, drainage, worsening pain, or fever.      Followup:    If not improving or if condition worsens, follow up with your Primary Care Provider, If not improving or if conditions worsens over the next 12-24 hours, go to the Emergency Department    There are no Patient Instructions on file for this visit.

## 2025-05-15 ENCOUNTER — OFFICE VISIT (OUTPATIENT)
Dept: URGENT CARE | Facility: URGENT CARE | Age: 55
End: 2025-05-15
Payer: COMMERCIAL

## 2025-05-15 VITALS
OXYGEN SATURATION: 96 % | HEART RATE: 77 BPM | BODY MASS INDEX: 42.33 KG/M2 | TEMPERATURE: 97 F | DIASTOLIC BLOOD PRESSURE: 82 MMHG | RESPIRATION RATE: 16 BRPM | SYSTOLIC BLOOD PRESSURE: 123 MMHG | WEIGHT: 295 LBS

## 2025-05-15 DIAGNOSIS — S61.310A LACERATION OF RIGHT INDEX FINGER WITHOUT FOREIGN BODY WITH DAMAGE TO NAIL, INITIAL ENCOUNTER: Primary | ICD-10-CM

## 2025-05-15 NOTE — PROGRESS NOTES
Iglesia Wolff presents to the clinic for removal of sutures. The patient has had sutures in place for 11 days. There has been no patient reported signs or symptoms of infection or drainage. 4  sutures are seen and located on the right index finger. Tetanus status is up to date. All sutures were easily removed today. Routine wound care discussed by the Provider and nurse. The patient will follow up as needed.   Lisbeth Oliveira LPN

## 2025-05-29 ENCOUNTER — TRANSFERRED RECORDS (OUTPATIENT)
Dept: MULTI SPECIALTY CLINIC | Facility: CLINIC | Age: 55
End: 2025-05-29
Payer: COMMERCIAL

## 2025-05-29 ENCOUNTER — TRANSFERRED RECORDS (OUTPATIENT)
Dept: ADMINISTRATIVE | Facility: CLINIC | Age: 55
End: 2025-05-29
Payer: COMMERCIAL

## 2025-05-29 LAB
ALT SERPL-CCNC: 30 IU/L (ref 0–44)
AST SERPL-CCNC: 25 IU/L (ref 0–40)

## 2025-05-30 NOTE — PROGRESS NOTES
_________________________________________________________________________________________________    Patient name: Iglesia Wolff  YOB: 1970  Encounter date: 05/29/2025 08:00 AM  Current date: 05/29/2025 09:51 AM  Procedure: Infusion      Infusion/Additional Medication Orders    Assessment: Crohn's disease of large intestine without complications K50.10     Medication grids  Order Date Medication Dose Route Rate Rate 2 Rate 3 Rate 4 Int. of Treat.   06/27/2024 Remicade 5mg/kg  mL/hr    8 Weeks   Inf. Date Medication % Conc. Inf. # Therapy Type Bag # Vials Total mgs Lot # Exp. Date   05/29/2025 Remicade  32 maintenance  7 671.00 [id number] 11/30/2027   Inf. Date Medication IV Site IV Gauge Start Stop Total Time Wt. (lbs) Wt. (kgs)   05/29/2025 Remicade left hand 22 7:45 AM 8:45 AM 0 hour(s) 0 minute(s) 295.20 133.878     Vitals Flowsheet  Time Temp Pulse Resp Sys BP Chikis BP Reaction Conc Rate   7:34 AM 96.9 74 12 122 80      8:15 AM 97.7 70 12 123 78      8:45 AM 97.9 77 12 123 79        Post Infusion  The patient did tolerate the infusion.     Nursing Notes  Order date: 06/27/2024  Last infusion date: 04/03/2025  Oral premeds per order: N  Specialty Pharmacy: N  Change in health status per assessment? N  IV maintenance 0.9% NS 500ml TKO  Start time: 0740  IV start by: Latasha ROYAL RN  IV and Fluid D/C time: 0900  NS volume infused: <50mL  Site condition: CDI and patent throughout infusion, no redness/swelling at IV site  D/C instructions reviewed, Pt verbalized understanding.  Latasha ROYAL RN    Date Medication Total mg Used mg Wasted mg   05/29/2025 Remicade 700.00 671.00 29.00   04/03/2025 Remicade 700.00 672.00 28.00   02/06/2025 Remicade 700.00 666.00 34.00   12/12/2024 Remicade 700.00 660.00 40.00   10/17/2024 Remicade 700.00 664.00 36.00   08/22/2024 Remicade 700.00 666.00 34.00       Visit oversight provided by:  Mary Oneil MD 05/29/2025 08:00 AM

## 2025-06-02 ENCOUNTER — TRANSFERRED RECORDS (OUTPATIENT)
Dept: ADMINISTRATIVE | Facility: CLINIC | Age: 55
End: 2025-06-02
Payer: COMMERCIAL

## 2025-06-03 NOTE — PROCEDURES
2025        Iglesia (Pat) Mariella   [address] [place] [id number]      Iglesia (Pat) Mariella,  :  1970    I am writing to let you know the results of the tests that were done the other day.   Thank you for allowing [facility] the opportunity to take part in your healthcare.  At [facility] we strive to provide each patient with the finest gastroenterology care available.  We hope your experience was pleasant and informative.    Your labs do not show a significant change.    I had previously ordered a FibroScan to check for any liver scarring that may have occurred due to your fatty liver but this does not appear to have been done.  Please call our office soon to schedule a FibroScan of the liver.  Hepatic Function Panel (7) 2025 07:32   Description Result Units Flags Range   Bilirubin, Total 1.3 mg/dL H 0.0-1.2   Bilirubin, Direct 0.39 mg/dL  0.00-0.40   AST (SGOT) 25 IU/L  0-40   ALT (SGPT) 30 IU/L  0-44   Albumin 4.0 g/dL  3.8-4.9   Alkaline Phosphatase 93 IU/L     Protein, Total 6.4 g/dL  6.0-8.5   Comments   Performed At: , [facility]  [address] [facility] [place], [place], [place], [id number]  Dean Jimenez MD, Phone: [phone]   CBC With Differential/Platelet 2025 07:32   Description Result Units Flags Range   Hemoglobin 15.3 g/dL  13.0-17.7   Hematocrit 45.6 %  37.5-51.0   MCV 91 fL  79-97   MCHC 33.6 g/dL  31.5-35.7   MCH 30.6 pg  26.6-33.0   RDW 12.8 %  11.6-15.4   Platelets 191 x10E3/uL  150-450   Neutrophils 71 %  Not Estab.   Lymphs 15 %  Not Estab.   Monocytes 9 %  Not Estab.   Eos 2 %  Not Estab.   Basos 1 %  Not Estab.   Neutrophils (Absolute) 5.6 x10E3/uL  1.4-7.0   Lymphs (Absolute) 1.2 x10E3/uL  0.7-3.1   Monocytes(Absolute) 0.7 x10E3/uL  0.1-0.9   Eos (Absolute) 0.1 x10E3/uL  0.0-0.4   Baso (Absolute) 0.1 x10E3/uL  0.0-0.2   Immature Grans (Abs) 0.1 x10E3/uL  0.0-0.1   Immature Granulocytes 2 %  Not Estab.   RBC 5.00 x10E6/uL  4.14-5.80   WBC 7.8 x10E3/uL  3.4-10.8    Comments   First Available              Performed At: , [facility]  [address] [facility], [place], [place], [id number]  Dean Jimenez MD, Phone: [phone]           Thank you.    Electronically signed by:  Aliya James MD 06/02/2025 07:30 AM  Document generated by:  Aliya James MD  06/02/2025  If your provider ordered multiple tests; the results may not become available at the same time.  If multiple test results are received within 14 days of one another, you may receive a duplicate.  cc:  Tc Greenwood MD

## 2025-06-30 ASSESSMENT — SLEEP AND FATIGUE QUESTIONNAIRES
HOW LIKELY ARE YOU TO NOD OFF OR FALL ASLEEP WHILE SITTING AND TALKING TO SOMEONE: WOULD NEVER DOZE
HOW LIKELY ARE YOU TO NOD OFF OR FALL ASLEEP WHILE SITTING AND READING: SLIGHT CHANCE OF DOZING
HOW LIKELY ARE YOU TO NOD OFF OR FALL ASLEEP WHILE LYING DOWN TO REST IN THE AFTERNOON WHEN CIRCUMSTANCES PERMIT: WOULD NEVER DOZE
HOW LIKELY ARE YOU TO NOD OFF OR FALL ASLEEP WHILE SITTING QUIETLY AFTER LUNCH WITHOUT ALCOHOL: WOULD NEVER DOZE
HOW LIKELY ARE YOU TO NOD OFF OR FALL ASLEEP WHILE WATCHING TV: SLIGHT CHANCE OF DOZING
HOW LIKELY ARE YOU TO NOD OFF OR FALL ASLEEP IN A CAR, WHILE STOPPED FOR A FEW MINUTES IN TRAFFIC: WOULD NEVER DOZE
HOW LIKELY ARE YOU TO NOD OFF OR FALL ASLEEP WHILE SITTING INACTIVE IN A PUBLIC PLACE: WOULD NEVER DOZE
HOW LIKELY ARE YOU TO NOD OFF OR FALL ASLEEP WHEN YOU ARE A PASSENGER IN A CAR FOR AN HOUR WITHOUT A BREAK: SLIGHT CHANCE OF DOZING

## 2025-07-02 ENCOUNTER — OFFICE VISIT (OUTPATIENT)
Dept: SLEEP MEDICINE | Facility: CLINIC | Age: 55
End: 2025-07-02
Attending: INTERNAL MEDICINE
Payer: COMMERCIAL

## 2025-07-02 ENCOUNTER — DOCUMENTATION ONLY (OUTPATIENT)
Dept: SLEEP MEDICINE | Facility: CLINIC | Age: 55
End: 2025-07-02

## 2025-07-02 DIAGNOSIS — R29.818 SUSPECTED SLEEP APNEA: ICD-10-CM

## 2025-07-02 DIAGNOSIS — G47.33 OSA (OBSTRUCTIVE SLEEP APNEA): Primary | ICD-10-CM

## 2025-07-02 NOTE — PROGRESS NOTES
Pt is completing a home sleep test. Pt was instructed on how to put on the Noxturnal T3 device and associated equipment before going to bed and given the opportunity to practice putting it on before leaving the sleep center. Pt was reminded to bring the home sleep test kit back to the center tomorrow, at the scheduled time for download and reporting. Patient was instructed to complete study using the following treatment?  None  Neck circumference:   ESS: 3  Stop Ban  Device number:

## 2025-07-03 ENCOUNTER — DOCUMENTATION ONLY (OUTPATIENT)
Dept: SLEEP MEDICINE | Facility: CLINIC | Age: 55
End: 2025-07-03
Attending: INTERNAL MEDICINE
Payer: COMMERCIAL

## 2025-07-03 NOTE — PROGRESS NOTES
Pt returned HST device. It was downloaded and forwarded data to the clinical specialist for scoring.   Arianna Carrera CMA on 7/3/2025 at 8:04 AM

## 2025-07-03 NOTE — PROGRESS NOTES
HST POST-STUDY QUESTIONNAIRE    What time did you go to bed?  10:50  How long do you think it took to fall asleep?  2-3 hours  What time did you wake up to start the day?  5:45  Did you get up during the night at all?  Yes  If you woke up, do you remember approximately what time(s)? Do not remember, I think was 3 times, once bathroom and 2 times to try to fall asleep.  Did you have any difficulty with the equipment?  No  Did you us any type of treatment with this study?  None  Was the head of the bed elevated? No  Did you sleep in a recliner?  No  Did you stop using CPAP at least 3 days before this test?  NA  Any other information you'd like us to know?

## 2025-07-17 NOTE — PROCEDURES
[facility]    Home Sleep Study Interpretation    Patient: Iglesia Wolff  MRN: [id number]  YOB: 1970  Study Date: 2025  PCP/Referring Provider: Tc Greenwood MD  Ordering Provider: Evangelina Villanueva MD    Indications for Home Study: Iglesia is a 55 year old patient  who presents with symptoms suggestive of obstructive sleep apnea.    Weight: 285.0 lbs  BMI: 40.9    [place]: 3/24   STOP BAN/8     Data: A full night home sleep study was performed recording the standard physiologic parameters including body position, movement, sound, nasal pressure, thermal oral airflow, chest and abdominal movements with respiratory inductance plethysmography, and oxygen saturation by pulse oximetry. Pulse rate was estimated by oximetry recording. This study was considered adequate based on > 4 hours of quality oximetry and respiratory recording. As specified by the AASM Manual for the Scoring of Sleep and Associated events, version 2.3, Rule VIII.D 1B, 4% oxygen desaturation scoring for hypopneas is used as a standard of care on all home sleep apnea testing.    Analysis Time: 405 minutes    Respiration:  Sleep Associated Hypoxemia: Sustained hypoxemia was not present.  Average oxygen saturation was 94%. Time with saturation less than 89% was 0.1 minutes. Time with saturation less than 90% was 0.6 minutes. The lowest oxygen saturation was 88.0%.  Snoring: Snoring was present 38% of the time with an average level of 73 dB. Duration of time snoring above 70 dB was 147.3 minutes.    Respiratory events: The home study revealed a presence of 111 obstructive apneas and 54 mixed and central apneas. There were 34 hypopneas resulting in a combined apnea/hypopnea index [AHI] of 29 events per hour with  17 per hour supine, 0  per hour prone, N/A upright, 42  per hour left side, and 26 per hour right side.    Position: Percent of time spent: Supine 19%, prone 5%, upright 0%, on left 39%, on  right 37%.    Heart Rate: By pulse oximetry, the average pulse rate was 62 bpm. The maximum pulse rate was 96 bpm and the minimum pulse rate was 47 bpm.    Assessment:  Moderate obstructive sleep apnea was present.  Sleep associated hypoxemia was not present.    Recommendations:  Consider initiating empiric treatment with auto-CPAP device with pressure settings ranging 5-15 cmH2O and clinical follow-up with review of compliance measures to control the snoring and sleep apnea.  Suggest optimizing sleep hygiene and avoiding sleep deprivation  Weight management    Diagnosis Code(s): Obstructive Sleep Apnea G47.33, Snoring R06.83    Electronically signed by: Migdalia Villanueva MD July 17, 2025  Diplomate, American Board of Internal Medicine, Sleep Medicine

## 2025-07-30 ENCOUNTER — DOCUMENTATION ONLY (OUTPATIENT)
Dept: SLEEP MEDICINE | Facility: CLINIC | Age: 55
End: 2025-07-30
Payer: COMMERCIAL

## 2025-07-30 DIAGNOSIS — G47.33 OSA (OBSTRUCTIVE SLEEP APNEA): Primary | ICD-10-CM

## 2025-07-30 NOTE — PROGRESS NOTES
Patient was offered choice of vendor and chose [facility].  Patient Iglesia Wolff was set up at [facility]  on July 30, 2025. Patient received a Resmed Airsense 11. Pressures were set at 5-15 cmH2O. Patient s ramp is 5 cmH2O for auto and FLEX/EPR is EPR, 2. Patient received a Resmed mask name: Airfit P30i nasal pillow mask size standard, heated tubing and heated humidifier. Patient needs compliant usage and sleep follow up for insurance compliance.  Patient has a follow up on 10/2/05928 with Liliana Ruiz PA-C .    Lorelei Wood

## 2025-08-04 ENCOUNTER — DOCUMENTATION ONLY (OUTPATIENT)
Dept: SLEEP MEDICINE | Facility: CLINIC | Age: 55
End: 2025-08-04
Payer: COMMERCIAL

## 2025-08-14 ENCOUNTER — DOCUMENTATION ONLY (OUTPATIENT)
Dept: SLEEP MEDICINE | Facility: CLINIC | Age: 55
End: 2025-08-14
Payer: COMMERCIAL

## 2025-08-18 ENCOUNTER — DOCUMENTATION ONLY (OUTPATIENT)
Dept: SLEEP MEDICINE | Facility: CLINIC | Age: 55
End: 2025-08-18
Payer: COMMERCIAL

## (undated) DEVICE — Device

## (undated) DEVICE — ENDO FORCEP ENDOJAW BIOPSY 2.8MMX230CM FB-220U

## (undated) DEVICE — GLOVE PROTEXIS POWDER FREE SMT 7.5  2D72PT75X

## (undated) DEVICE — PACK MINOR CUSTOM RIDGES SBA32RMRMA

## (undated) DEVICE — LINEN HALF SHEET 5512

## (undated) DEVICE — KIT PROCEDURE W/CLEAN-A-SCOPE LINERS V2 200800

## (undated) DEVICE — PREP CHLORAPREP 26ML TINTED ORANGE  260815

## (undated) DEVICE — SYR BULB IRRIG 50ML LATEX FREE 0035280

## (undated) DEVICE — DRAPE LAP W/ARMBOARD 29410

## (undated) DEVICE — DAVINCI XI DRAPE COLUMN 470341

## (undated) DEVICE — TUBING INSUFFLATION W/FILTER CPC TO LUER 620-030-301

## (undated) DEVICE — LINEN GOWN XLG 5407

## (undated) DEVICE — TUBING SUCTION MEDI-VAC SOFT 3/16"X20' N520A

## (undated) DEVICE — DAVINCI XI FCP BIPOLAR FENESTRATED 470205

## (undated) DEVICE — CLIP ENDO HEMO-LOC GREEN MED/LG 544230

## (undated) DEVICE — SYR 05ML LL W/O NDL

## (undated) DEVICE — LINEN TOWEL PACK X10 5473

## (undated) DEVICE — BAG CLEAR TRASH 1.3M 39X33" P4040C

## (undated) DEVICE — GOWN XLG DISP 9545

## (undated) DEVICE — SOL WATER IRRIG 1000ML BOTTLE 2F7114

## (undated) DEVICE — NDL INSUFFLATION 13GA 120MM C2201

## (undated) DEVICE — ENDO TROCAR BLUNT TIP KII BALLOON 12X100MM C0R47

## (undated) DEVICE — PREP POVIDONE IODINE SOLUTION 10% 4OZ BOTTLE 29906-004

## (undated) DEVICE — PAD CHUX UNDERPAD 30X36" P3036C

## (undated) DEVICE — DAVINCI XI CLIP APPLIER MED HEM-O-LOK GREEN 470327

## (undated) DEVICE — SUCTION CANISTER MEDIVAC LINER 3000ML W/LID 65651-530

## (undated) DEVICE — SYSTEM CLEARIFY VISUALIZATION 21-345

## (undated) DEVICE — GLOVE PROTEXIS W/NEU-THERA 7.0  2D73TE70

## (undated) DEVICE — DRSG STERI STRIP 1/2X4" R1547

## (undated) DEVICE — DAVINCI XI DRAPE ARM 470015

## (undated) DEVICE — BLADE CLIPPER SGL USE 9680

## (undated) DEVICE — SU MONOCRYL 4-0 PS-2 27" UND Y426H

## (undated) DEVICE — DAVINCI XI MONOPOLAR SCISSORS HOT SHEARS 8MM 470179

## (undated) DEVICE — DAVINCI HOT SHEARS TIP COVER  400180

## (undated) DEVICE — DAVINCI XI GRASPER ENDOWRIST PROGRASP 470093

## (undated) DEVICE — SU VICRYL 3-0 SH 27" J316H

## (undated) DEVICE — SOL NACL 0.9% IRRIG 1000ML BOTTLE 2F7124

## (undated) DEVICE — TAPE CLOTH ADHESIVE 3" LATEX FREE

## (undated) DEVICE — ESU GROUND PAD ADULT REM W/15' CORD E7507DB

## (undated) DEVICE — SU VICRYL 0 CT-2 27" J334H

## (undated) DEVICE — NDL 25GA 1.5" 305127

## (undated) DEVICE — PAD PERI INDIV WRAP 11" 2022A

## (undated) DEVICE — PREP SKIN SCRUB TRAY 4461A

## (undated) DEVICE — PANTIES MESH LG/XLG 2PK 706M2

## (undated) DEVICE — LINEN TOWEL PACK X6 WHITE 5487

## (undated) DEVICE — SUCTION TIP YANKAUER W/O VENT K86

## (undated) DEVICE — DRAPE LAP PEDS DISP 29492

## (undated) DEVICE — PACK GOWN 3/PK DISP XL SBA32GPFCB

## (undated) DEVICE — LINEN FULL SHEET 5511

## (undated) DEVICE — ENDO SNARE POLYPECTOMY OVAL 25MM LOOP SD-240U-25

## (undated) DEVICE — LINEN TOWEL PACK X30 5481

## (undated) DEVICE — DRAPE MAYO STAND 23X54 8337

## (undated) DEVICE — ENDO POUCH UNIV RETRIEVAL SYSTEM INZII 10MM CD001

## (undated) DEVICE — DRSG GAUZE 4X4" TRAY 6939

## (undated) DEVICE — ESU GROUND PAD ADULT W/CORD E7507

## (undated) DEVICE — DAVINCI XI SEAL UNIVERSAL 5-8MM 470361

## (undated) RX ORDER — OXYCODONE HYDROCHLORIDE 5 MG/1
TABLET ORAL
Status: DISPENSED
Start: 2020-06-05

## (undated) RX ORDER — PROPOFOL 10 MG/ML
INJECTION, EMULSION INTRAVENOUS
Status: DISPENSED
Start: 2020-06-05

## (undated) RX ORDER — FENTANYL CITRATE 50 UG/ML
INJECTION, SOLUTION INTRAMUSCULAR; INTRAVENOUS
Status: DISPENSED
Start: 2020-06-05

## (undated) RX ORDER — DEXAMETHASONE SODIUM PHOSPHATE 4 MG/ML
INJECTION, SOLUTION INTRA-ARTICULAR; INTRALESIONAL; INTRAMUSCULAR; INTRAVENOUS; SOFT TISSUE
Status: DISPENSED
Start: 2020-09-16

## (undated) RX ORDER — LIDOCAINE HYDROCHLORIDE 10 MG/ML
INJECTION, SOLUTION EPIDURAL; INFILTRATION; INTRACAUDAL; PERINEURAL
Status: DISPENSED
Start: 2020-09-16

## (undated) RX ORDER — CEFAZOLIN SODIUM IN 0.9 % NACL 3 G/100 ML
INTRAVENOUS SOLUTION, PIGGYBACK (ML) INTRAVENOUS
Status: DISPENSED
Start: 2020-06-05

## (undated) RX ORDER — ACETAMINOPHEN 325 MG/1
TABLET ORAL
Status: DISPENSED
Start: 2020-09-16

## (undated) RX ORDER — PROPOFOL 10 MG/ML
INJECTION, EMULSION INTRAVENOUS
Status: DISPENSED
Start: 2020-09-16

## (undated) RX ORDER — GLYCOPYRROLATE 0.2 MG/ML
INJECTION INTRAMUSCULAR; INTRAVENOUS
Status: DISPENSED
Start: 2020-09-16

## (undated) RX ORDER — NEOSTIGMINE METHYLSULFATE 1 MG/ML
VIAL (ML) INJECTION
Status: DISPENSED
Start: 2020-09-16

## (undated) RX ORDER — DEXAMETHASONE SODIUM PHOSPHATE 4 MG/ML
INJECTION, SOLUTION INTRA-ARTICULAR; INTRALESIONAL; INTRAMUSCULAR; INTRAVENOUS; SOFT TISSUE
Status: DISPENSED
Start: 2020-06-05

## (undated) RX ORDER — ONDANSETRON 2 MG/ML
INJECTION INTRAMUSCULAR; INTRAVENOUS
Status: DISPENSED
Start: 2020-06-05

## (undated) RX ORDER — LIDOCAINE HYDROCHLORIDE 20 MG/ML
INJECTION, SOLUTION EPIDURAL; INFILTRATION; INTRACAUDAL; PERINEURAL
Status: DISPENSED
Start: 2020-06-05

## (undated) RX ORDER — FENTANYL CITRATE 50 UG/ML
INJECTION, SOLUTION INTRAMUSCULAR; INTRAVENOUS
Status: DISPENSED
Start: 2020-09-16

## (undated) RX ORDER — INDOCYANINE GREEN AND WATER 25 MG
KIT INJECTION
Status: DISPENSED
Start: 2020-06-05

## (undated) RX ORDER — PHENYLEPHRINE HCL IN 0.9% NACL 1 MG/10 ML
SYRINGE (ML) INTRAVENOUS
Status: DISPENSED
Start: 2020-06-05

## (undated) RX ORDER — HYDROMORPHONE HYDROCHLORIDE 1 MG/ML
INJECTION, SOLUTION INTRAMUSCULAR; INTRAVENOUS; SUBCUTANEOUS
Status: DISPENSED
Start: 2020-06-05

## (undated) RX ORDER — ONDANSETRON 2 MG/ML
INJECTION INTRAMUSCULAR; INTRAVENOUS
Status: DISPENSED
Start: 2020-09-16